# Patient Record
Sex: FEMALE | Race: ASIAN | Employment: OTHER | URBAN - METROPOLITAN AREA
[De-identification: names, ages, dates, MRNs, and addresses within clinical notes are randomized per-mention and may not be internally consistent; named-entity substitution may affect disease eponyms.]

---

## 2021-10-15 ENCOUNTER — LAB REQUISITION (OUTPATIENT)
Dept: LAB | Facility: CLINIC | Age: 71
End: 2021-10-15

## 2021-10-15 DIAGNOSIS — D63.8 ANEMIA IN OTHER CHRONIC DISEASES CLASSIFIED ELSEWHERE: ICD-10-CM

## 2021-10-15 LAB
ALBUMIN SERPL-MCNC: 2.7 G/DL (ref 3.5–5)
ALP SERPL-CCNC: 531 U/L (ref 45–120)
ALT SERPL W P-5'-P-CCNC: 42 U/L (ref 0–45)
ANION GAP SERPL CALCULATED.3IONS-SCNC: 11 MMOL/L (ref 5–18)
AST SERPL W P-5'-P-CCNC: 91 U/L (ref 0–40)
BASOPHILS # BLD AUTO: 0 10E3/UL (ref 0–0.2)
BASOPHILS NFR BLD AUTO: 1 %
BILIRUB SERPL-MCNC: 2.2 MG/DL (ref 0–1)
BUN SERPL-MCNC: 18 MG/DL (ref 8–28)
CALCIUM SERPL-MCNC: 9.2 MG/DL (ref 8.5–10.5)
CHLORIDE BLD-SCNC: 102 MMOL/L (ref 98–107)
CO2 SERPL-SCNC: 24 MMOL/L (ref 22–31)
CREAT SERPL-MCNC: 2 MG/DL (ref 0.6–1.1)
EOSINOPHIL # BLD AUTO: 0.2 10E3/UL (ref 0–0.7)
EOSINOPHIL NFR BLD AUTO: 8 %
ERYTHROCYTE [DISTWIDTH] IN BLOOD BY AUTOMATED COUNT: 18.9 % (ref 10–15)
GFR SERPL CREATININE-BSD FRML MDRD: 24 ML/MIN/1.73M2
GLUCOSE BLD-MCNC: 255 MG/DL (ref 70–125)
HCT VFR BLD AUTO: 24.7 % (ref 35–47)
HGB BLD-MCNC: 7.9 G/DL (ref 11.7–15.7)
IMM GRANULOCYTES # BLD: 0 10E3/UL
IMM GRANULOCYTES NFR BLD: 0 %
LYMPHOCYTES # BLD AUTO: 0.5 10E3/UL (ref 0.8–5.3)
LYMPHOCYTES NFR BLD AUTO: 20 %
MCH RBC QN AUTO: 24.9 PG (ref 26.5–33)
MCHC RBC AUTO-ENTMCNC: 32 G/DL (ref 31.5–36.5)
MCV RBC AUTO: 78 FL (ref 78–100)
MONOCYTES # BLD AUTO: 0.2 10E3/UL (ref 0–1.3)
MONOCYTES NFR BLD AUTO: 10 %
NEUTROPHILS # BLD AUTO: 1.4 10E3/UL (ref 1.6–8.3)
NEUTROPHILS NFR BLD AUTO: 61 %
NRBC # BLD AUTO: 0 10E3/UL
NRBC BLD AUTO-RTO: 0 /100
PLATELET # BLD AUTO: 135 10E3/UL (ref 150–450)
POTASSIUM BLD-SCNC: 4 MMOL/L (ref 3.5–5)
PROT SERPL-MCNC: 5.4 G/DL (ref 6–8)
RBC # BLD AUTO: 3.17 10E6/UL (ref 3.8–5.2)
SODIUM SERPL-SCNC: 137 MMOL/L (ref 136–145)
TSH SERPL DL<=0.005 MIU/L-ACNC: 2.6 UIU/ML (ref 0.3–5)
VIT B12 SERPL-MCNC: 1893 PG/ML (ref 213–816)
WBC # BLD AUTO: 2.3 10E3/UL (ref 4–11)

## 2021-10-15 PROCEDURE — 82607 VITAMIN B-12: CPT | Performed by: INTERNAL MEDICINE

## 2021-10-15 PROCEDURE — 85025 COMPLETE CBC W/AUTO DIFF WBC: CPT | Performed by: INTERNAL MEDICINE

## 2021-10-15 PROCEDURE — 80053 COMPREHEN METABOLIC PANEL: CPT | Performed by: INTERNAL MEDICINE

## 2021-10-15 PROCEDURE — 84443 ASSAY THYROID STIM HORMONE: CPT | Performed by: INTERNAL MEDICINE

## 2021-10-21 ENCOUNTER — LAB REQUISITION (OUTPATIENT)
Dept: LAB | Facility: CLINIC | Age: 71
End: 2021-10-21

## 2021-10-21 DIAGNOSIS — D63.8 ANEMIA IN OTHER CHRONIC DISEASES CLASSIFIED ELSEWHERE: ICD-10-CM

## 2021-10-21 LAB
ALBUMIN SERPL-MCNC: 3.3 G/DL (ref 3.5–5)
ALP SERPL-CCNC: 659 U/L (ref 45–120)
ALT SERPL W P-5'-P-CCNC: 40 U/L (ref 0–45)
ANION GAP SERPL CALCULATED.3IONS-SCNC: 15 MMOL/L (ref 5–18)
AST SERPL W P-5'-P-CCNC: 102 U/L (ref 0–40)
BASOPHILS # BLD AUTO: 0 10E3/UL (ref 0–0.2)
BASOPHILS NFR BLD AUTO: 1 %
BILIRUB SERPL-MCNC: 2.3 MG/DL (ref 0–1)
BUN SERPL-MCNC: 20 MG/DL (ref 8–28)
CALCIUM SERPL-MCNC: 10 MG/DL (ref 8.5–10.5)
CHLORIDE BLD-SCNC: 110 MMOL/L (ref 98–107)
CO2 SERPL-SCNC: 18 MMOL/L (ref 22–31)
CREAT SERPL-MCNC: 1.98 MG/DL (ref 0.6–1.1)
EOSINOPHIL # BLD AUTO: 0.1 10E3/UL (ref 0–0.7)
EOSINOPHIL NFR BLD AUTO: 4 %
ERYTHROCYTE [DISTWIDTH] IN BLOOD BY AUTOMATED COUNT: 19.6 % (ref 10–15)
GFR SERPL CREATININE-BSD FRML MDRD: 25 ML/MIN/1.73M2
GLUCOSE BLD-MCNC: 142 MG/DL (ref 70–125)
HCT VFR BLD AUTO: 31.1 % (ref 35–47)
HGB BLD-MCNC: 9.6 G/DL (ref 11.7–15.7)
IMM GRANULOCYTES # BLD: 0 10E3/UL
IMM GRANULOCYTES NFR BLD: 0 %
LYMPHOCYTES # BLD AUTO: 0.8 10E3/UL (ref 0.8–5.3)
LYMPHOCYTES NFR BLD AUTO: 25 %
MCH RBC QN AUTO: 24.8 PG (ref 26.5–33)
MCHC RBC AUTO-ENTMCNC: 30.9 G/DL (ref 31.5–36.5)
MCV RBC AUTO: 80 FL (ref 78–100)
MONOCYTES # BLD AUTO: 0.2 10E3/UL (ref 0–1.3)
MONOCYTES NFR BLD AUTO: 6 %
NEUTROPHILS # BLD AUTO: 1.9 10E3/UL (ref 1.6–8.3)
NEUTROPHILS NFR BLD AUTO: 64 %
NRBC # BLD AUTO: 0 10E3/UL
NRBC BLD AUTO-RTO: 0 /100
PLATELET # BLD AUTO: 147 10E3/UL (ref 150–450)
POTASSIUM BLD-SCNC: 4.2 MMOL/L (ref 3.5–5)
PROT SERPL-MCNC: 6.8 G/DL (ref 6–8)
RBC # BLD AUTO: 3.87 10E6/UL (ref 3.8–5.2)
SODIUM SERPL-SCNC: 143 MMOL/L (ref 136–145)
WBC # BLD AUTO: 3 10E3/UL (ref 4–11)

## 2021-10-21 PROCEDURE — 80053 COMPREHEN METABOLIC PANEL: CPT | Performed by: INTERNAL MEDICINE

## 2021-10-21 PROCEDURE — 85025 COMPLETE CBC W/AUTO DIFF WBC: CPT | Performed by: INTERNAL MEDICINE

## 2021-10-22 ENCOUNTER — LAB REQUISITION (OUTPATIENT)
Dept: LAB | Facility: CLINIC | Age: 71
End: 2021-10-22

## 2021-10-22 DIAGNOSIS — D63.8 ANEMIA IN OTHER CHRONIC DISEASES CLASSIFIED ELSEWHERE: ICD-10-CM

## 2021-10-22 LAB
ALBUMIN SERPL-MCNC: 2.8 G/DL (ref 3.5–5)
ALP SERPL-CCNC: 555 U/L (ref 45–120)
ALT SERPL W P-5'-P-CCNC: 38 U/L (ref 0–45)
ANION GAP SERPL CALCULATED.3IONS-SCNC: 12 MMOL/L (ref 5–18)
AST SERPL W P-5'-P-CCNC: 96 U/L (ref 0–40)
BASOPHILS # BLD AUTO: 0 10E3/UL (ref 0–0.2)
BASOPHILS NFR BLD AUTO: 1 %
BILIRUB SERPL-MCNC: 1.8 MG/DL (ref 0–1)
BUN SERPL-MCNC: 18 MG/DL (ref 8–28)
CALCIUM SERPL-MCNC: 9.2 MG/DL (ref 8.5–10.5)
CHLORIDE BLD-SCNC: 110 MMOL/L (ref 98–107)
CO2 SERPL-SCNC: 21 MMOL/L (ref 22–31)
CREAT SERPL-MCNC: 1.81 MG/DL (ref 0.6–1.1)
EOSINOPHIL # BLD AUTO: 0.1 10E3/UL (ref 0–0.7)
EOSINOPHIL NFR BLD AUTO: 7 %
ERYTHROCYTE [DISTWIDTH] IN BLOOD BY AUTOMATED COUNT: 18.9 % (ref 10–15)
GFR SERPL CREATININE-BSD FRML MDRD: 28 ML/MIN/1.73M2
GLUCOSE BLD-MCNC: 87 MG/DL (ref 70–125)
HCT VFR BLD AUTO: 26.7 % (ref 35–47)
HGB BLD-MCNC: 8.4 G/DL (ref 11.7–15.7)
IMM GRANULOCYTES # BLD: 0 10E3/UL
IMM GRANULOCYTES NFR BLD: 0 %
LYMPHOCYTES # BLD AUTO: 0.4 10E3/UL (ref 0.8–5.3)
LYMPHOCYTES NFR BLD AUTO: 17 %
MCH RBC QN AUTO: 25.3 PG (ref 26.5–33)
MCHC RBC AUTO-ENTMCNC: 31.5 G/DL (ref 31.5–36.5)
MCV RBC AUTO: 80 FL (ref 78–100)
MONOCYTES # BLD AUTO: 0.2 10E3/UL (ref 0–1.3)
MONOCYTES NFR BLD AUTO: 8 %
NEUTROPHILS # BLD AUTO: 1.5 10E3/UL (ref 1.6–8.3)
NEUTROPHILS NFR BLD AUTO: 67 %
NRBC # BLD AUTO: 0 10E3/UL
NRBC BLD AUTO-RTO: 0 /100
PLATELET # BLD AUTO: 102 10E3/UL (ref 150–450)
POTASSIUM BLD-SCNC: 3.3 MMOL/L (ref 3.5–5)
PROT SERPL-MCNC: 5.6 G/DL (ref 6–8)
RBC # BLD AUTO: 3.32 10E6/UL (ref 3.8–5.2)
SODIUM SERPL-SCNC: 143 MMOL/L (ref 136–145)
WBC # BLD AUTO: 2.2 10E3/UL (ref 4–11)

## 2021-10-22 PROCEDURE — 80053 COMPREHEN METABOLIC PANEL: CPT | Performed by: INTERNAL MEDICINE

## 2021-10-22 PROCEDURE — 85025 COMPLETE CBC W/AUTO DIFF WBC: CPT | Performed by: INTERNAL MEDICINE

## 2021-12-11 ENCOUNTER — APPOINTMENT (OUTPATIENT)
Dept: CT IMAGING | Facility: HOSPITAL | Age: 71
DRG: 193 | End: 2021-12-11
Attending: EMERGENCY MEDICINE
Payer: COMMERCIAL

## 2021-12-11 ENCOUNTER — APPOINTMENT (OUTPATIENT)
Dept: ULTRASOUND IMAGING | Facility: HOSPITAL | Age: 71
DRG: 193 | End: 2021-12-11
Attending: EMERGENCY MEDICINE
Payer: COMMERCIAL

## 2021-12-11 ENCOUNTER — HOSPITAL ENCOUNTER (INPATIENT)
Facility: HOSPITAL | Age: 71
LOS: 3 days | Discharge: HOME HEALTH | DRG: 193 | End: 2021-12-14
Attending: EMERGENCY MEDICINE | Admitting: STUDENT IN AN ORGANIZED HEALTH CARE EDUCATION/TRAINING PROGRAM
Payer: COMMERCIAL

## 2021-12-11 ENCOUNTER — APPOINTMENT (OUTPATIENT)
Dept: RADIOLOGY | Facility: HOSPITAL | Age: 71
DRG: 193 | End: 2021-12-11
Attending: EMERGENCY MEDICINE
Payer: COMMERCIAL

## 2021-12-11 DIAGNOSIS — A41.9 SEPSIS WITHOUT ACUTE ORGAN DYSFUNCTION, DUE TO UNSPECIFIED ORGANISM (H): ICD-10-CM
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
## 2021-12-11 DIAGNOSIS — K74.60 CIRRHOSIS OF LIVER WITH ASCITES, UNSPECIFIED HEPATIC CIRRHOSIS TYPE (H): ICD-10-CM

## 2021-12-11 DIAGNOSIS — J18.9 HOSPITAL-ACQUIRED PNEUMONIA: ICD-10-CM

## 2021-12-11 DIAGNOSIS — R18.8 CIRRHOSIS OF LIVER WITH ASCITES, UNSPECIFIED HEPATIC CIRRHOSIS TYPE (H): ICD-10-CM

## 2021-12-11 DIAGNOSIS — G40.909 SEIZURE DISORDER (H): Primary | ICD-10-CM

## 2021-12-11 PROBLEM — N18.9 ANEMIA IN CHRONIC KIDNEY DISEASE: Status: ACTIVE | Noted: 2021-03-23

## 2021-12-11 PROBLEM — D63.1 ANEMIA IN CHRONIC KIDNEY DISEASE: Status: ACTIVE | Noted: 2021-03-23

## 2021-12-11 PROBLEM — E78.5 HYPERLIPIDEMIA LDL GOAL <70: Status: ACTIVE | Noted: 2017-06-26

## 2021-12-11 PROBLEM — E11.21 DIABETIC NEPHROPATHY ASSOCIATED WITH TYPE 2 DIABETES MELLITUS (H): Status: ACTIVE | Noted: 2020-08-03

## 2021-12-11 LAB
ALBUMIN SERPL-MCNC: 2.8 G/DL (ref 3.5–5)
ALBUMIN UR-MCNC: 10 MG/DL
ALP SERPL-CCNC: 446 U/L (ref 45–120)
ALT SERPL W P-5'-P-CCNC: 28 U/L (ref 0–45)
AMMONIA PLAS-SCNC: 44 UMOL/L (ref 11–35)
ANION GAP SERPL CALCULATED.3IONS-SCNC: 11 MMOL/L (ref 5–18)
APPEARANCE UR: CLEAR
APTT PPP: 38 SECONDS (ref 22–38)
AST SERPL W P-5'-P-CCNC: 65 U/L (ref 0–40)
BACTERIA #/AREA URNS HPF: ABNORMAL /HPF
BASE EXCESS BLDV CALC-SCNC: -0.6 MMOL/L
BASOPHILS # BLD AUTO: 0 10E3/UL (ref 0–0.2)
BASOPHILS NFR BLD AUTO: 0 %
BILIRUB SERPL-MCNC: 2.4 MG/DL (ref 0–1)
BILIRUB UR QL STRIP: NEGATIVE
BNP SERPL-MCNC: 73 PG/ML (ref 0–127)
BUN SERPL-MCNC: 25 MG/DL (ref 8–28)
CALCIUM SERPL-MCNC: 9.8 MG/DL (ref 8.5–10.5)
CHLORIDE BLD-SCNC: 110 MMOL/L (ref 98–107)
CO2 SERPL-SCNC: 20 MMOL/L (ref 22–31)
COLOR UR AUTO: ABNORMAL
CREAT SERPL-MCNC: 2.52 MG/DL (ref 0.6–1.1)
D DIMER PPP FEU-MCNC: 1.04 UG/ML FEU (ref 0–0.5)
EOSINOPHIL # BLD AUTO: 0 10E3/UL (ref 0–0.7)
EOSINOPHIL NFR BLD AUTO: 0 %
ERYTHROCYTE [DISTWIDTH] IN BLOOD BY AUTOMATED COUNT: 15 % (ref 10–15)
GFR SERPL CREATININE-BSD FRML MDRD: 18 ML/MIN/1.73M2
GLUCOSE BLD-MCNC: 268 MG/DL (ref 70–125)
GLUCOSE UR STRIP-MCNC: 70 MG/DL
HCO3 BLDV-SCNC: 24 MMOL/L (ref 24–30)
HCT VFR BLD AUTO: 29.7 % (ref 35–47)
HGB BLD-MCNC: 9.3 G/DL (ref 11.7–15.7)
HGB UR QL STRIP: NEGATIVE
HOLD SPECIMEN: NORMAL
IMM GRANULOCYTES # BLD: 0 10E3/UL
IMM GRANULOCYTES NFR BLD: 0 %
INR PPP: 1.44 (ref 0.9–1.15)
KETONES BLD-SCNC: 0.26 MMOL/L
KETONES UR STRIP-MCNC: NEGATIVE MG/DL
LACTATE SERPL-SCNC: 2.4 MMOL/L (ref 0.7–2)
LEUKOCYTE ESTERASE UR QL STRIP: NEGATIVE
LYMPHOCYTES # BLD AUTO: 0.6 10E3/UL (ref 0.8–5.3)
LYMPHOCYTES NFR BLD AUTO: 7 %
MCH RBC QN AUTO: 23.7 PG (ref 26.5–33)
MCHC RBC AUTO-ENTMCNC: 31.3 G/DL (ref 31.5–36.5)
MCV RBC AUTO: 76 FL (ref 78–100)
MONOCYTES # BLD AUTO: 0.3 10E3/UL (ref 0–1.3)
MONOCYTES NFR BLD AUTO: 4 %
NEUTROPHILS # BLD AUTO: 7.6 10E3/UL (ref 1.6–8.3)
NEUTROPHILS NFR BLD AUTO: 89 %
NITRATE UR QL: NEGATIVE
NRBC # BLD AUTO: 0 10E3/UL
NRBC BLD AUTO-RTO: 0 /100
OXYHGB MFR BLDV: 62.4 % (ref 70–75)
PCO2 BLDV: 37 MM HG (ref 35–50)
PH BLDV: 7.42 [PH] (ref 7.35–7.45)
PH UR STRIP: 7 [PH] (ref 5–7)
PLATELET # BLD AUTO: 113 10E3/UL (ref 150–450)
PO2 BLDV: 36 MM HG (ref 25–47)
POTASSIUM BLD-SCNC: 4.5 MMOL/L (ref 3.5–5)
PROT SERPL-MCNC: 6.6 G/DL (ref 6–8)
RBC # BLD AUTO: 3.92 10E6/UL (ref 3.8–5.2)
RBC URINE: 2 /HPF
SAO2 % BLDV: 63.8 % (ref 70–75)
SARS-COV-2 RNA RESP QL NAA+PROBE: NEGATIVE
SODIUM SERPL-SCNC: 141 MMOL/L (ref 136–145)
SP GR UR STRIP: 1.01 (ref 1–1.03)
TRANSITIONAL EPI: <1 /HPF
TROPONIN I SERPL-MCNC: 0.03 NG/ML (ref 0–0.29)
TROPONIN I SERPL-MCNC: 0.04 NG/ML (ref 0–0.29)
UROBILINOGEN UR STRIP-MCNC: <2 MG/DL
WBC # BLD AUTO: 8.5 10E3/UL (ref 4–11)
WBC URINE: 6 /HPF

## 2021-12-11 PROCEDURE — 99285 EMERGENCY DEPT VISIT HI MDM: CPT | Mod: 25

## 2021-12-11 PROCEDURE — 85379 FIBRIN DEGRADATION QUANT: CPT | Performed by: EMERGENCY MEDICINE

## 2021-12-11 PROCEDURE — 82140 ASSAY OF AMMONIA: CPT | Performed by: EMERGENCY MEDICINE

## 2021-12-11 PROCEDURE — 84156 ASSAY OF PROTEIN URINE: CPT | Performed by: NURSE PRACTITIONER

## 2021-12-11 PROCEDURE — 96361 HYDRATE IV INFUSION ADD-ON: CPT

## 2021-12-11 PROCEDURE — 82805 BLOOD GASES W/O2 SATURATION: CPT | Performed by: EMERGENCY MEDICINE

## 2021-12-11 PROCEDURE — 83935 ASSAY OF URINE OSMOLALITY: CPT | Performed by: NURSE PRACTITIONER

## 2021-12-11 PROCEDURE — 70450 CT HEAD/BRAIN W/O DYE: CPT

## 2021-12-11 PROCEDURE — C9803 HOPD COVID-19 SPEC COLLECT: HCPCS

## 2021-12-11 PROCEDURE — 85610 PROTHROMBIN TIME: CPT | Performed by: EMERGENCY MEDICINE

## 2021-12-11 PROCEDURE — 93970 EXTREMITY STUDY: CPT

## 2021-12-11 PROCEDURE — 83690 ASSAY OF LIPASE: CPT | Performed by: STUDENT IN AN ORGANIZED HEALTH CARE EDUCATION/TRAINING PROGRAM

## 2021-12-11 PROCEDURE — 36415 COLL VENOUS BLD VENIPUNCTURE: CPT | Performed by: EMERGENCY MEDICINE

## 2021-12-11 PROCEDURE — 85025 COMPLETE CBC W/AUTO DIFF WBC: CPT | Performed by: EMERGENCY MEDICINE

## 2021-12-11 PROCEDURE — 87635 SARS-COV-2 COVID-19 AMP PRB: CPT | Performed by: EMERGENCY MEDICINE

## 2021-12-11 PROCEDURE — 87040 BLOOD CULTURE FOR BACTERIA: CPT | Performed by: EMERGENCY MEDICINE

## 2021-12-11 PROCEDURE — 120N000001 HC R&B MED SURG/OB

## 2021-12-11 PROCEDURE — 93005 ELECTROCARDIOGRAM TRACING: CPT | Performed by: EMERGENCY MEDICINE

## 2021-12-11 PROCEDURE — 82010 KETONE BODYS QUAN: CPT | Performed by: EMERGENCY MEDICINE

## 2021-12-11 PROCEDURE — 999N000127 HC STATISTIC PERIPHERAL IV START W US GUIDANCE

## 2021-12-11 PROCEDURE — 51702 INSERT TEMP BLADDER CATH: CPT

## 2021-12-11 PROCEDURE — 83880 ASSAY OF NATRIURETIC PEPTIDE: CPT | Performed by: EMERGENCY MEDICINE

## 2021-12-11 PROCEDURE — 83605 ASSAY OF LACTIC ACID: CPT | Performed by: EMERGENCY MEDICINE

## 2021-12-11 PROCEDURE — 71250 CT THORAX DX C-: CPT

## 2021-12-11 PROCEDURE — 85730 THROMBOPLASTIN TIME PARTIAL: CPT | Performed by: EMERGENCY MEDICINE

## 2021-12-11 PROCEDURE — 80053 COMPREHEN METABOLIC PANEL: CPT | Performed by: EMERGENCY MEDICINE

## 2021-12-11 PROCEDURE — 84484 ASSAY OF TROPONIN QUANT: CPT | Performed by: EMERGENCY MEDICINE

## 2021-12-11 PROCEDURE — 83036 HEMOGLOBIN GLYCOSYLATED A1C: CPT | Performed by: STUDENT IN AN ORGANIZED HEALTH CARE EDUCATION/TRAINING PROGRAM

## 2021-12-11 PROCEDURE — 71045 X-RAY EXAM CHEST 1 VIEW: CPT

## 2021-12-11 PROCEDURE — 258N000003 HC RX IP 258 OP 636: Performed by: EMERGENCY MEDICINE

## 2021-12-11 PROCEDURE — 81001 URINALYSIS AUTO W/SCOPE: CPT | Performed by: EMERGENCY MEDICINE

## 2021-12-11 RX ORDER — SODIUM CHLORIDE 9 MG/ML
INJECTION, SOLUTION INTRAVENOUS CONTINUOUS
Status: DISCONTINUED | OUTPATIENT
Start: 2021-12-11 | End: 2021-12-13

## 2021-12-11 RX ORDER — PIPERACILLIN SODIUM, TAZOBACTAM SODIUM 3; .375 G/15ML; G/15ML
3.38 INJECTION, POWDER, LYOPHILIZED, FOR SOLUTION INTRAVENOUS ONCE
Status: COMPLETED | OUTPATIENT
Start: 2021-12-11 | End: 2021-12-12

## 2021-12-11 RX ORDER — LIDOCAINE HYDROCHLORIDE 20 MG/ML
20 JELLY TOPICAL
Status: DISCONTINUED | OUTPATIENT
Start: 2021-12-11 | End: 2021-12-14 | Stop reason: HOSPADM

## 2021-12-11 RX ADMIN — SODIUM CHLORIDE: 9 INJECTION, SOLUTION INTRAVENOUS at 21:46

## 2021-12-11 RX ADMIN — SODIUM CHLORIDE 1000 ML: 9 INJECTION, SOLUTION INTRAVENOUS at 19:31

## 2021-12-11 NOTE — ED PROVIDER NOTES
Emergency Department Encounter     Evaluation Date & Time:   2021  5:36 PM    CHIEF COMPLAINT:  Altered Mental Status      Triage Note:No notes on file      Impression and Plan     ED COURSE & MEDICAL DECISION MAKIN:47 PM I met with the patient to gather history and to perform my initial exam. I discussed the plan for care while in the Emergency Department. PPE (gloves, glasses, surgical cap, N95 mask) was worn during patient encounters.   8:34 PM I re-evaluated the patient.  9:05 PM Lab called to report a critical result for lactic acid at 2.4.  11:04 PM I discussed the case with hospitalist, Dr. Hall, who accepts the patient for admission.        ED Course as of 21 2344   Sat Dec 11, 2021   1837 This is a nonverbal patient.  Apparently she has a history of becoming nonverbal when she gets infections.  Typically according to EMS who spoke to her family these are typically UTIs.  She otherwise is responding with once saying ouch and with opening her eyes with light touch stimulation and movement of her body.  She seems to use all of her extremities without any obvious one-sided weakness, more just diffusely weak.  So, I do not see any obvious signs of new stroke.  No fever but she is a bit tachypneic and so I certainly think she may meet criteria for sepsis if source of infection is found and so we did do sepsis protocol.  She is getting chest x-ray for the tachypnea to look for any other causes such as fluid, pneumothorax, and will do EKG and troponin to look for signs of cardiac ischemia.  She does grimace with upper abdominal palpation so will look at gallbladder liver pancreas and do imaging of that area to additionally look at stomach and intestinal issues that may be causing her symptoms.  Otherwise since she is not able to provide urine sample and it is quite important I have asked them to put in a Guerra catheter which can be removed later if it is not needed for sepsis.  She does appear  a bit dehydrated so I am ordering some initial fluids for her   1958 Patient has a history of cirrhosis which may explain her elevated liver enzymes, but she is newly quite weak so does need further imaging.  Her D-dimer is elevated and it had not been previously, but as mentioned she does have the underlying cirrhosis.  She has some renal insufficiency enough so that I do not feel comfortable doing CT imaging with contrast to rule out PE but I am going to do ultrasound of her lower extremities to see if there is any evidence for DVT that then would lead to anticoagulation treatment.   2300 Her CAT scan does show a bit of a pneumonia.  It would explain her tachypnea.  Vitally she did not meet criteria for sepsis but she does have an elevated lactic acid that is nonspecific so I think at least borderline sepsis.  Additionally I have limited information on her.  I do have information from a recent transitional care unit that stated she had a recent hospitalization but it is unclear how long she was on in the transitional care unit so this may be hospital associated pneumonia.  Will start treatment with Zosyn at this time.  Otherwise she is doing well she is maintaining her oxygen saturation and is at this point protecting her airway and maintaining wakefulness just continues to be tachypneic.  No other source of infection found.  Her D-dimer was elevated but we do have a source with pneumonia that would cause her to be tachypneic, and her lower extremity ultrasounds are negative for DVT so at this time I do not think that she needs anticoagulation.   2312 Patient admitted to dr mckeon.  M/s tele.  Will be signed out to er md while awaiting room but no beds available in system, will be boarding here.  Only mild ascites       At the conclusion of the encounter I discussed the results of all the tests and the disposition. The questions were answered. The patient or family acknowledged understanding and was agreeable with  the care plan.        FINAL IMPRESSION:    ICD-10-CM    1. Hospital-acquired pneumonia  J18.9     Y95    2. Cirrhosis of liver with ascites, unspecified hepatic cirrhosis type (H)  K74.60     R18.8    3. Sepsis without acute organ dysfunction, due to unspecified organism (H)  A41.9        0 minutes of critical care time        MEDICATIONS GIVEN IN THE EMERGENCY DEPARTMENT:  Medications   sodium chloride (PF) 0.9% PF flush 3 mL (has no administration in time range)   sodium chloride (PF) 0.9% PF flush 3 mL (3 mLs Intracatheter Given 12/11/21 1931)   lidocaine (XYLOCAINE) 2 % external gel 20 mL (has no administration in time range)   0.9% sodium chloride BOLUS (0 mLs Intravenous Stopped 12/11/21 2113)     Followed by   sodium chloride 0.9% infusion ( Intravenous New Bag 12/11/21 2146)   piperacillin-tazobactam (ZOSYN) 3.375 g vial to attach to  mL bag (has no administration in time range)       NEW PRESCRIPTIONS STARTED AT TODAY'S ED VISIT:  New Prescriptions    No medications on file       HPI     HPI     Leon Sweeney is a 71 year old female with a pertinent history of GERD, CKD, HTN, Diabetes, cirrhosis due to chronic hepatitis C, and seizure disorder who presents to this ED by EMS for evaluation of AMS    Per EMS, they were called to patient's home because she has been lethargic and altered mental status,which they note is typical for patient when she has a UTI. Patient has not been talking to anyone. The had paperwork with them that patient was recently at [facility].  Patient has diabetes and took her insulin today, but en route it was still measuring 325. Patient has received 350 ml of normal saline en route. They note patient is warm to touch.    7:57 PM Patient's daughter arrived and provided a more detailed history. Audrey is patient's caregiver in [facility] and they have a home nurse that comes and checks on patient. She was last at the house on 12/8 (~4 days ago) where patient was acting at  "baseline. Daughter notes that patient has a history of congestive heart failure and edema that has slowly been progressing. Daughter states today, patient refused her medications this morning, but was still acting at baseline. When daughter returned this evening, patient took her medications, but was not as verbally responsive as she normally is. Patient has also been experiencing constipation, but had a \"large softball size rock hard stool\" today.     Daughter states patient has been eating less over the past two weeks. 3 weeks ago, patient's blood sugars was in the 500s, but today it is 162. September 26th was the last time patient lived at home alone as she sustained a right hip fracture.     Unable to obtain history from patient during AMS.     REVIEW OF SYSTEMS:  Review of Systems   Unable to perform ROS: Mental status change     remainder of systems are all otherwise negative.        Medical History     Past Medical History:   Diagnosis Date     Acute kidney failure (H)      Anxiety      Cerebral infarction (H)      Chronic kidney disease      Diabetes (H)      Encephalopathy      Gastroesophageal reflux disease      Heart failure (H)      Hydronephrosis      Hyperkalemia      Hypertension      Insomnia      Malnutrition (H)      Pleural effusion      Portal hypertension (H)      Seizures (H)      Transient cerebral ischemic attack        History reviewed. No pertinent surgical history.    History reviewed. No pertinent family history.    Social History     Tobacco Use     Smoking status: None     Smokeless tobacco: None   Substance Use Topics     Alcohol use: None     Drug use: None       No current outpatient medications on file.      Physical Exam     First Vitals:  Patient Vitals for the past 24 hrs:   BP Temp Temp src Pulse Resp SpO2 Weight   12/11/21 2300 118/57 -- -- 78 (!) 38 98 % --   12/11/21 2245 (!) 141/63 -- -- 81 (!) 34 100 % --   12/11/21 2230 (!) 147/68 -- -- 79 (!) 34 98 % --   12/11/21 2215 " "(!) 145/73 -- -- 79 (!) 36 96 % --   12/11/21 2200 134/69 -- -- 80 (!) 33 100 % --   12/11/21 2145 129/62 -- -- 80 29 98 % --   12/11/21 2115 -- -- -- 82 (!) 31 100 % --   12/11/21 2100 137/65 -- -- 80 (!) 31 98 % --   12/11/21 2045 (!) 141/63 -- -- 84 (!) 32 100 % --   12/11/21 2030 (!) 147/67 -- -- 83 (!) 36 100 % --   12/11/21 2015 (!) 148/87 -- -- 97 (!) 32 95 % --   12/11/21 2000 (!) 146/68 -- -- 78 23 99 % --   12/11/21 1951 -- -- -- -- -- -- 68.5 kg (151 lb)   12/11/21 1945 135/63 -- -- 83 (!) 35 100 % --   12/11/21 1943 -- -- -- -- (!) 40 -- --   12/11/21 1941 -- -- -- -- (!) 39 -- --   12/11/21 1940 -- -- -- -- (!) 37 -- --   12/11/21 1939 -- -- -- -- (!) 34 -- --   12/11/21 1937 -- -- -- -- (!) 33 -- --   12/11/21 1936 -- -- -- -- (!) 36 -- --   12/11/21 1935 -- -- -- -- (!) 35 -- --   12/11/21 1934 -- -- -- -- (!) 36 -- --   12/11/21 1930 (!) 143/63 -- -- 79 (!) 32 100 % --   12/11/21 1915 -- -- -- 77 26 -- --   12/11/21 1900 -- -- -- 78 (!) 35 98 % --   12/11/21 1846 -- 97.4  F (36.3  C) Axillary -- -- -- --   12/11/21 1845 (!) 167/82 -- -- 77 (!) 36 100 % --       PHYSICAL EXAM:   Constitutional:   Patient is non-verbal, but holds herself up when we sit her in the sitting position  HENT:  Normocephalic, posterior pharynx wnl, mucous membranes tacky and moderately pink   Eyes:  PERRL, EOMI, Conjunctiva normal, No discharge, no scleral icterus.  Respiratory:  Breathing easily, lungs clear to auscultation  Cardiovascular:  Regular rate and rhythm.  nl s1s2 0 murmurs, rubs, or gallops.  Peripheral pulses dp, pt, and radial are wnl. Trace3 peripheral edema in lower legs  GI:  Bowel sounds normal, No tenderness, No flank tenderness, nondistended. Abdomen soft, but has facial grimace when I palpated upper abdomen.  :No CVA tenderness.   Musculoskeletal:  Moves all extremities without obvious deformities or limitations.  No erythematous or swollen major joints, She said \"Oww\" when I palpated lower left leg. "   Lymphatic:  No cervical lymphadenopathy  Neurologic:  Does respond to light touch. Prefers to keep eyes closed. Non-verbal, but holds herself up when we sit her in the sitting position. Normal motor function, Normal sensory function, No focal deficits noted.   Psychiatric:  Affect normal, Judgment normal, Mood normal.     Results     LAB:  All pertinent labs reviewed and interpreted  Results for orders placed or performed during the hospital encounter of 12/11/21   XR Chest Port 1 View     Status: None    Narrative    EXAM: XR CHEST PORT 1 VIEW  LOCATION: [facility]  DATE/TIME: 12/11/2021 6:08 PM    INDICATION: tachypnea  COMPARISON: 2/1/2021      Impression    IMPRESSION: Heart size upper limits of normal. Very slight interstitial prominence but no focal pneumonia. Tiny ovoid opacity at right costophrenic sulcus should represent granuloma.   CT Chest Abdomen Pelvis w/o Contrast     Status: None    Narrative    EXAM: CT CHEST ABDOMEN PELVIS W/O CONTRAST  LOCATION: [facility]  DATE/TIME: 12/11/2021 9:35 PM    INDICATION: weakness, tachypnea, elevated lft's  COMPARISON: None.  TECHNIQUE: CT scan of the chest, abdomen, and pelvis was performed without IV contrast. Multiplanar reformats were obtained. Dose reduction techniques were used.   CONTRAST: None.    FINDINGS:   LUNGS AND PLEURA: Respiratory motion. Mild mosaic attenuation which can be seen with air trapping/small airway disease. Atelectasis or infiltrate right lung base and small right pleural effusion. Reticulonodular infiltrate within the right middle and   lower lobes.    MEDIASTINUM/AXILLAE: Calcified mediastinal lymph nodes. Atherosclerotic aorta. Small hiatal hernia.    CORONARY ARTERY CALCIFICATION: Moderate..    HEPATOBILIARY: Hepatic cyst. Subcentimeter hepatic hypodensity near the gallbladder fossa small for characterization. Cirrhosis.    PANCREAS: Peripancreatic haziness may be exaggerated by  respiratory motion.    SPLEEN: Splenomegaly    ADRENAL GLANDS: Thickening.    KIDNEYS/BLADDER: Calcifications along the medullary region the kidneys suggesting medullary nephrocalcinosis. No hydronephrosis. Guerra within the bladder.     BOWEL: Right colonic wall thickening. Gastric wall thickening versus underdistention. Moderate amount of stool in the rectal region. Small amount of free fluid abdomen and pelvis.    LYMPH NODES: Normal.    VASCULATURE: Varices left upper quadrant and left retroperitoneum.    PELVIC ORGANS: Small amount of free fluid. Presacral edema.    MUSCULOSKELETAL: Degenerative change osseous structures. Bilateral sacral insufficiency fractures. Remote fractures of the right superior and inferior pubic rami. Lytic lesion in the right parasymphyseal region. Difficult to exclude pathologic fracture   of the right superior pubic ramus. This measures 2.2 x 3.2 cm series 5 image 275. Compression deformity T12. Remote rib fractures.      Impression    IMPRESSION:  1.  Reticulonodular infiltrates within the right middle and lower lobe could be due to infection. Follow-up to confirm resolution recommended to exclude any underlying pulmonary nodules.  2.  Small right pleural effusion.  3.  Cirrhosis and portal hypertension.  4.  Small amount of ascites.  5.  Peripancreatic haziness may be exaggerated by respiratory motion. Pancreatitis not excluded.  6.  Medullary nephrocalcinosis.  7.  Wall thickening of the right colon can be seen with portal hypertensive colopathy. Infectious or inflammatory colitis not excluded.  8.  Wall thickening versus underdistention of the stomach.  9.  Remote fractures right superior and inferior pubic rami. Difficult to exclude underlying lytic lesion/pathologic fracture of the right superior pubic ramus.  10.  Bilateral sacral insufficiency fractures.   Head CT w/o contrast     Status: None    Narrative    EXAM: CT HEAD W/O CONTRAST  LOCATION: [facility]  DATE/TIME: 12/11/2021 9:35 PM    INDICATION: Altered mental status. Nonverbal.  COMPARISON: Brain MR 10/23/2012.  TECHNIQUE: Routine CT Head without IV contrast. Multiplanar reformats. Dose reduction techniques were used.    FINDINGS:  INTRACRANIAL CONTENTS: No intracranial hemorrhage, extraaxial collection, or mass effect. No CT evidence of acute infarct. Mild presumed chronic small vessel ischemic changes. Moderate generalized volume loss. No hydrocephalus.     VISUALIZED ORBITS/SINUSES/MASTOIDS: No intraorbital abnormality. No paranasal sinus mucosal disease. No middle ear or mastoid effusion.    BONES/SOFT TISSUES: No acute abnormality.      Impression    IMPRESSION:  1.  No CT evidence for acute intracranial process.  2.  Brain atrophy and presumed chronic microvascular ischemic changes as above.   US Lower Extremity Venous Duplex Bilateral     Status: None    Narrative    EXAM: US LOWER EXTREMITY VENOUS DUPLEX BILATERAL  LOCATION: [facility]  DATE/TIME: 12/11/2021 9:48 PM    INDICATION: swelling, pain  COMPARISON: None.  TECHNIQUE: Venous Duplex ultrasound of bilateral lower extremities with and without compression, augmentation and duplex. Color flow and spectral Doppler with waveform analysis performed.    FINDINGS: Exam includes the common femoral, femoral, popliteal veins as well as segmentally visualized deep calf veins and greater saphenous vein.     RIGHT: No deep vein thrombosis. No superficial thrombophlebitis. No popliteal cyst.    LEFT: No deep vein thrombosis. No superficial thrombophlebitis. No popliteal cyst.      Impression    IMPRESSION:  1.  No deep venous thrombosis in the bilateral lower extremities.   Comprehensive metabolic panel     Status: Abnormal   Result Value Ref Range    Sodium 141 136 - 145 mmol/L    Potassium 4.5 3.5 - 5.0 mmol/L    Chloride 110 (H) 98 - 107 mmol/L    Carbon Dioxide (CO2) 20 (L) 22 - 31 mmol/L    Anion Gap 11 5 - 18 mmol/L     Urea Nitrogen 25 8 - 28 mg/dL    Creatinine 2.52 (H) 0.60 - 1.10 mg/dL    Calcium 9.8 8.5 - 10.5 mg/dL    Glucose 268 (H) 70 - 125 mg/dL    Alkaline Phosphatase 446 (H) 45 - 120 U/L    AST 65 (H) 0 - 40 U/L    ALT 28 0 - 45 U/L    Protein Total 6.6 6.0 - 8.0 g/dL    Albumin 2.8 (L) 3.5 - 5.0 g/dL    Bilirubin Total 2.4 (H) 0.0 - 1.0 mg/dL    GFR Estimate 18 (L) >60 mL/min/1.73m2   Lactic acid whole blood     Status: Abnormal   Result Value Ref Range    Lactic Acid 2.4 (H) 0.7 - 2.0 mmol/L   Blood gas venous     Status: Abnormal   Result Value Ref Range    pH Venous 7.42 7.35 - 7.45    pCO2 Venous 37 35 - 50 mm Hg    pO2 Venous 36 25 - 47 mm Hg    Bicarbonate Venous 24 24 - 30 mmol/L    Base Excess/Deficit (+/-) -0.6   mmol/L    Oxyhemoglobin Venous 62.4 (L) 70.0 - 75.0 %    O2 Sat, Venous 63.8 (L) 70.0 - 75.0 %   INR     Status: Abnormal   Result Value Ref Range    INR 1.44 (H) 0.90 - 1.15   Partial thromboplastin time     Status: Normal   Result Value Ref Range    aPTT 38 22 - 38 Seconds   D dimer quantitative     Status: Abnormal   Result Value Ref Range    D-Dimer Quantitative 1.04 (H) 0.00 - 0.50 ug/mL FEU    Narrative    This D-dimer assay is intended for use in conjunction with a clinical pretest probability assessment model to exclude pulmonary embolism (PE) and deep venous thrombosis (DVT) in outpatients suspected of PE or DVT. The cut-off value is 0.50 ug/mL FEU.   UA with Microscopic     Status: Abnormal   Result Value Ref Range    Color Urine Light Yellow Colorless, Straw, Light Yellow, Yellow    Appearance Urine Clear Clear    Glucose Urine 70  (A) Negative mg/dL    Bilirubin Urine Negative Negative    Ketones Urine Negative Negative mg/dL    Specific Gravity Urine 1.008 1.001 - 1.030    Blood Urine Negative Negative    pH Urine 7.0 5.0 - 7.0    Protein Albumin Urine 10  (A) Negative mg/dL    Urobilinogen Urine <2.0 <2.0 mg/dL    Nitrite Urine Negative Negative    Leukocyte Esterase Urine Negative  Negative    Bacteria Urine Few (A) None Seen /HPF    RBC Urine 2 <=2 /HPF    WBC Urine 6 (H) <=5 /HPF    Transitional Epithelials Urine <1 <=1 /HPF   Ketone Beta-Hydroxybutyrate Quantitative     Status: Normal   Result Value Ref Range    Ketone (Beta-Hydroxybutyrate) Quantitative 0.26 <=0.3 mmol/L   Troponin I     Status: Normal   Result Value Ref Range    Troponin I 0.03 0.00 - 0.29 ng/mL   B-Type Natriuretic Peptide ([facility] Only)     Status: Normal   Result Value Ref Range    BNP 73 0 - 127 pg/mL   Asymptomatic COVID-19 Virus (Coronavirus) by PCR Nasopharyngeal     Status: Normal    Specimen: Nasopharyngeal; Swab   Result Value Ref Range    SARS CoV2 PCR Negative Negative    Narrative    Testing was performed using the [employer]  SARS-CoV-2 & Influenza A/B Assay on the [employer]  [employer]  System.  This test should be ordered for the detection of SARS-COV-2 in individuals who meet SARS-CoV-2 clinical and/or epidemiological criteria. Test performance is unknown in asymptomatic patients.  This test is for in vitro diagnostic use under the FDA EUA for laboratories certified under CLIA to perform moderate and/or high complexity testing. This test has not been FDA cleared or approved.  A negative test does not rule out the presence of PCR inhibitors in the specimen or target RNA in concentration below the limit of detection for the assay. The possibility of a false negative should be considered if the patient's recent exposure or clinical presentation suggests COVID-19.  [facility] are certified under the Clinical Laboratory Improvement Amendments of 1988 (CLIA-88) as qualified to perform moderate and/or high complexity laboratory testing.   CBC with platelets and differential     Status: Abnormal   Result Value Ref Range    WBC Count 8.5 4.0 - 11.0 10e3/uL    RBC Count 3.92 3.80 - 5.20 10e6/uL    Hemoglobin 9.3 (L) 11.7 - 15.7 g/dL    Hematocrit 29.7 (L) 35.0 - 47.0 %    MCV 76 (L) 78 - 100 fL    MCH 23.7 (L)  26.5 - 33.0 pg    MCHC 31.3 (L) 31.5 - 36.5 g/dL    RDW 15.0 10.0 - 15.0 %    Platelet Count 113 (L) 150 - 450 10e3/uL    % Neutrophils 89 %    % Lymphocytes 7 %    % Monocytes 4 %    % Eosinophils 0 %    % Basophils 0 %    % Immature Granulocytes 0 %    NRBCs per 100 WBC 0 <1 /100    Absolute Neutrophils 7.6 1.6 - 8.3 10e3/uL    Absolute Lymphocytes 0.6 (L) 0.8 - 5.3 10e3/uL    Absolute Monocytes 0.3 0.0 - 1.3 10e3/uL    Absolute Eosinophils 0.0 0.0 - 0.7 10e3/uL    Absolute Basophils 0.0 0.0 - 0.2 10e3/uL    Absolute Immature Granulocytes 0.0 <=0.4 10e3/uL    Absolute NRBCs 0.0 10e3/uL   Extra Red Top Tube     Status: None   Result Value Ref Range    Hold Specimen JIC    Troponin I     Status: Normal   Result Value Ref Range    Troponin I 0.04 0.00 - 0.29 ng/mL   Ammonia (on ice)     Status: Abnormal   Result Value Ref Range    Ammonia 44 (H) 11 - 35 umol/L   [facility] Draw *Canceled*     Status: None ()    Narrative    The following orders were created for panel order [place] Draw.  Procedure                               Abnormality         Status                     ---------                               -----------         ------                       Please view results for these tests on the individual orders.   CBC with platelets differential     Status: Abnormal    Narrative    The following orders were created for panel order CBC with platelets differential.  Procedure                               Abnormality         Status                     ---------                               -----------         ------                     CBC with platelets and d...[[id number]]  Abnormal            Final result                 Please view results for these tests on the individual orders.   Extra Tube     Status: None    Narrative    The following orders were created for panel order Extra Tube.  Procedure                               Abnormality         Status                     ---------                                -----------         ------                     Extra Red Top Tube[[id number]]                               Final result                 Please view results for these tests on the individual orders.       RADIOLOGY:  No results found.    ECG:  None    PROCEDURES:  Procedures:      [facility] Documentation       CMS Diagnoses: sepsis without severe sepsis.       The creation of this record is based on the scribe s observations of the work being performed by Jazmine Castañeda and the provider s statements to them. This document has been checked and approved by MD Mercedez Barraza MD  Emergency Medicine  [facility] EMERGENCY DEPARTMENT         Mercedez Zepeda MD  12/11/21 1520

## 2021-12-11 NOTE — ED NOTES
Bed: JNED-09  Expected date: 12/11/21  Expected time: 5:22 PM  Means of arrival: Ambulance  Comments:  Anatoly 71f urosepsis

## 2021-12-12 ENCOUNTER — APPOINTMENT (OUTPATIENT)
Dept: OCCUPATIONAL THERAPY | Facility: HOSPITAL | Age: 71
DRG: 193 | End: 2021-12-12
Attending: INTERNAL MEDICINE
Payer: COMMERCIAL

## 2021-12-12 ENCOUNTER — APPOINTMENT (OUTPATIENT)
Dept: SPEECH THERAPY | Facility: HOSPITAL | Age: 71
DRG: 193 | End: 2021-12-12
Attending: STUDENT IN AN ORGANIZED HEALTH CARE EDUCATION/TRAINING PROGRAM
Payer: COMMERCIAL

## 2021-12-12 ENCOUNTER — APPOINTMENT (OUTPATIENT)
Dept: CARDIOLOGY | Facility: HOSPITAL | Age: 71
DRG: 193 | End: 2021-12-12
Payer: COMMERCIAL

## 2021-12-12 PROBLEM — G93.41 METABOLIC ENCEPHALOPATHY: Status: ACTIVE | Noted: 2021-12-12

## 2021-12-12 LAB
ALBUMIN MFR UR ELPH: 21.4 MG/DL
ATRIAL RATE - MUSE: 82 BPM
CREAT SERPL-MCNC: 2.01 MG/DL (ref 0.6–1.1)
CREAT UR-MCNC: 17 MG/DL
DIASTOLIC BLOOD PRESSURE - MUSE: NORMAL MMHG
GFR SERPL CREATININE-BSD FRML MDRD: 24 ML/MIN/1.73M2
GLUCOSE BLDC GLUCOMTR-MCNC: 134 MG/DL (ref 70–99)
GLUCOSE BLDC GLUCOMTR-MCNC: 138 MG/DL (ref 70–99)
GLUCOSE BLDC GLUCOMTR-MCNC: 234 MG/DL (ref 70–99)
GLUCOSE BLDC GLUCOMTR-MCNC: 265 MG/DL (ref 70–99)
GLUCOSE BLDC GLUCOMTR-MCNC: 329 MG/DL (ref 70–99)
HBA1C MFR BLD: 7.8 %
INTERPRETATION ECG - MUSE: NORMAL
LEVETIRACETAM (KEPPRA): 46.8 UG/ML (ref 6–46)
LIPASE SERPL-CCNC: 66 U/L (ref 0–52)
LVEF ECHO: NORMAL
OSMOLALITY UR: 324 MOSM/KG (ref 300–900)
P AXIS - MUSE: 65 DEGREES
PHOSPHATE SERPL-MCNC: 2.5 MG/DL (ref 2.5–4.5)
PR INTERVAL - MUSE: 176 MS
PROT/CREAT 24H UR: 1.26 MG/MG CR
QRS DURATION - MUSE: 70 MS
QT - MUSE: 422 MS
QTC - MUSE: 493 MS
R AXIS - MUSE: -21 DEGREES
SYSTOLIC BLOOD PRESSURE - MUSE: NORMAL MMHG
T AXIS - MUSE: 66 DEGREES
VENTRICULAR RATE- MUSE: 82 BPM

## 2021-12-12 PROCEDURE — 84155 ASSAY OF PROTEIN SERUM: CPT | Performed by: INTERNAL MEDICINE

## 2021-12-12 PROCEDURE — 250N000011 HC RX IP 250 OP 636: Performed by: EMERGENCY MEDICINE

## 2021-12-12 PROCEDURE — 36415 COLL VENOUS BLD VENIPUNCTURE: CPT | Performed by: PSYCHIATRY & NEUROLOGY

## 2021-12-12 PROCEDURE — 96376 TX/PRO/DX INJ SAME DRUG ADON: CPT

## 2021-12-12 PROCEDURE — 250N000012 HC RX MED GY IP 250 OP 636 PS 637: Performed by: INTERNAL MEDICINE

## 2021-12-12 PROCEDURE — 999N000208 ECHOCARDIOGRAM COMPLETE

## 2021-12-12 PROCEDURE — 96366 THER/PROPH/DIAG IV INF ADDON: CPT

## 2021-12-12 PROCEDURE — 97166 OT EVAL MOD COMPLEX 45 MIN: CPT | Mod: GO

## 2021-12-12 PROCEDURE — 96367 TX/PROPH/DG ADDL SEQ IV INF: CPT

## 2021-12-12 PROCEDURE — 99223 1ST HOSP IP/OBS HIGH 75: CPT | Performed by: PSYCHIATRY & NEUROLOGY

## 2021-12-12 PROCEDURE — 250N000013 HC RX MED GY IP 250 OP 250 PS 637: Performed by: STUDENT IN AN ORGANIZED HEALTH CARE EDUCATION/TRAINING PROGRAM

## 2021-12-12 PROCEDURE — 258N000003 HC RX IP 258 OP 636: Performed by: EMERGENCY MEDICINE

## 2021-12-12 PROCEDURE — 250N000013 HC RX MED GY IP 250 OP 250 PS 637: Performed by: NURSE PRACTITIONER

## 2021-12-12 PROCEDURE — 93306 TTE W/DOPPLER COMPLETE: CPT | Mod: 26 | Performed by: INTERNAL MEDICINE

## 2021-12-12 PROCEDURE — 82565 ASSAY OF CREATININE: CPT | Performed by: STUDENT IN AN ORGANIZED HEALTH CARE EDUCATION/TRAINING PROGRAM

## 2021-12-12 PROCEDURE — 92610 EVALUATE SWALLOWING FUNCTION: CPT | Mod: GN

## 2021-12-12 PROCEDURE — 99222 1ST HOSP IP/OBS MODERATE 55: CPT | Performed by: STUDENT IN AN ORGANIZED HEALTH CARE EDUCATION/TRAINING PROGRAM

## 2021-12-12 PROCEDURE — 250N000013 HC RX MED GY IP 250 OP 250 PS 637: Performed by: INTERNAL MEDICINE

## 2021-12-12 PROCEDURE — 120N000001 HC R&B MED SURG/OB

## 2021-12-12 PROCEDURE — 84100 ASSAY OF PHOSPHORUS: CPT | Performed by: NURSE PRACTITIONER

## 2021-12-12 PROCEDURE — 84165 PROTEIN E-PHORESIS SERUM: CPT | Mod: TC | Performed by: INTERNAL MEDICINE

## 2021-12-12 PROCEDURE — 250N000011 HC RX IP 250 OP 636: Performed by: STUDENT IN AN ORGANIZED HEALTH CARE EDUCATION/TRAINING PROGRAM

## 2021-12-12 PROCEDURE — 96361 HYDRATE IV INFUSION ADD-ON: CPT

## 2021-12-12 PROCEDURE — 97535 SELF CARE MNGMENT TRAINING: CPT | Mod: GO

## 2021-12-12 PROCEDURE — 258N000003 HC RX IP 258 OP 636: Performed by: STUDENT IN AN ORGANIZED HEALTH CARE EDUCATION/TRAINING PROGRAM

## 2021-12-12 PROCEDURE — 36415 COLL VENOUS BLD VENIPUNCTURE: CPT | Performed by: STUDENT IN AN ORGANIZED HEALTH CARE EDUCATION/TRAINING PROGRAM

## 2021-12-12 PROCEDURE — 258N000003 HC RX IP 258 OP 636: Performed by: NURSE PRACTITIONER

## 2021-12-12 PROCEDURE — 80177 DRUG SCRN QUAN LEVETIRACETAM: CPT | Performed by: PSYCHIATRY & NEUROLOGY

## 2021-12-12 PROCEDURE — 255N000002 HC RX 255 OP 636: Performed by: INTERNAL MEDICINE

## 2021-12-12 PROCEDURE — 96365 THER/PROPH/DIAG IV INF INIT: CPT | Mod: 59

## 2021-12-12 RX ORDER — VANCOMYCIN HYDROCHLORIDE 500 MG/10ML
500 INJECTION, POWDER, LYOPHILIZED, FOR SOLUTION INTRAVENOUS EVERY 24 HOURS
Status: DISCONTINUED | OUTPATIENT
Start: 2021-12-13 | End: 2021-12-12

## 2021-12-12 RX ORDER — METOPROLOL TARTRATE 25 MG/1
25 TABLET, FILM COATED ORAL 2 TIMES DAILY
COMMUNITY

## 2021-12-12 RX ORDER — NICOTINE POLACRILEX 4 MG
15-30 LOZENGE BUCCAL
Status: DISCONTINUED | OUTPATIENT
Start: 2021-12-12 | End: 2021-12-14 | Stop reason: HOSPADM

## 2021-12-12 RX ORDER — LEVETIRACETAM 500 MG/1
500 TABLET ORAL 2 TIMES DAILY
Status: DISCONTINUED | OUTPATIENT
Start: 2021-12-12 | End: 2021-12-14 | Stop reason: HOSPADM

## 2021-12-12 RX ORDER — METHOCARBAMOL 500 MG/1
500 TABLET, FILM COATED ORAL 4 TIMES DAILY PRN
Status: ON HOLD | COMMUNITY
End: 2021-12-14

## 2021-12-12 RX ORDER — ONDANSETRON 4 MG/1
4 TABLET, ORALLY DISINTEGRATING ORAL EVERY 6 HOURS PRN
Status: DISCONTINUED | OUTPATIENT
Start: 2021-12-12 | End: 2021-12-14 | Stop reason: HOSPADM

## 2021-12-12 RX ORDER — METOPROLOL TARTRATE 25 MG/1
25 TABLET, FILM COATED ORAL 2 TIMES DAILY
Status: DISCONTINUED | OUTPATIENT
Start: 2021-12-12 | End: 2021-12-14 | Stop reason: HOSPADM

## 2021-12-12 RX ORDER — LACTULOSE 10 G/15ML
15 SOLUTION ORAL DAILY
COMMUNITY

## 2021-12-12 RX ORDER — LEVETIRACETAM 500 MG/1
500 TABLET ORAL 2 TIMES DAILY
COMMUNITY

## 2021-12-12 RX ORDER — DEXTROSE MONOHYDRATE 25 G/50ML
25-50 INJECTION, SOLUTION INTRAVENOUS
Status: DISCONTINUED | OUTPATIENT
Start: 2021-12-12 | End: 2021-12-14 | Stop reason: HOSPADM

## 2021-12-12 RX ORDER — CLOPIDOGREL BISULFATE 75 MG/1
75 TABLET ORAL DAILY
COMMUNITY

## 2021-12-12 RX ORDER — CLOPIDOGREL BISULFATE 75 MG/1
75 TABLET ORAL DAILY
Status: DISCONTINUED | OUTPATIENT
Start: 2021-12-12 | End: 2021-12-14 | Stop reason: HOSPADM

## 2021-12-12 RX ORDER — FAMOTIDINE 20 MG/1
20 TABLET, FILM COATED ORAL
Status: DISCONTINUED | OUTPATIENT
Start: 2021-12-13 | End: 2021-12-14 | Stop reason: HOSPADM

## 2021-12-12 RX ORDER — VANCOMYCIN HYDROCHLORIDE 500 MG/10ML
500 INJECTION, POWDER, LYOPHILIZED, FOR SOLUTION INTRAVENOUS
Status: DISCONTINUED | OUTPATIENT
Start: 2021-12-12 | End: 2021-12-13 | Stop reason: ALTCHOICE

## 2021-12-12 RX ORDER — MULTIPLE VITAMINS W/ MINERALS TAB 9MG-400MCG
1 TAB ORAL DAILY
COMMUNITY

## 2021-12-12 RX ORDER — ACETAMINOPHEN 325 MG/1
650 TABLET ORAL
COMMUNITY

## 2021-12-12 RX ORDER — AMLODIPINE BESYLATE 5 MG/1
5 TABLET ORAL DAILY
COMMUNITY

## 2021-12-12 RX ORDER — TRAZODONE HYDROCHLORIDE 50 MG/1
50 TABLET, FILM COATED ORAL
COMMUNITY

## 2021-12-12 RX ORDER — ACETAMINOPHEN 650 MG/1
650 SUPPOSITORY RECTAL EVERY 6 HOURS PRN
Status: DISCONTINUED | OUTPATIENT
Start: 2021-12-12 | End: 2021-12-14 | Stop reason: HOSPADM

## 2021-12-12 RX ORDER — LIDOCAINE 40 MG/G
CREAM TOPICAL
Status: DISCONTINUED | OUTPATIENT
Start: 2021-12-12 | End: 2021-12-14 | Stop reason: HOSPADM

## 2021-12-12 RX ORDER — AMLODIPINE BESYLATE 5 MG/1
5 TABLET ORAL ONCE
Status: COMPLETED | OUTPATIENT
Start: 2021-12-12 | End: 2021-12-12

## 2021-12-12 RX ORDER — PIPERACILLIN SODIUM, TAZOBACTAM SODIUM 3; .375 G/15ML; G/15ML
3.38 INJECTION, POWDER, LYOPHILIZED, FOR SOLUTION INTRAVENOUS EVERY 8 HOURS
Status: DISCONTINUED | OUTPATIENT
Start: 2021-12-12 | End: 2021-12-14 | Stop reason: ALTCHOICE

## 2021-12-12 RX ORDER — ROSUVASTATIN CALCIUM 20 MG/1
20 TABLET, COATED ORAL DAILY
COMMUNITY

## 2021-12-12 RX ORDER — ACETAMINOPHEN 325 MG/1
650 TABLET ORAL EVERY 6 HOURS PRN
Status: DISCONTINUED | OUTPATIENT
Start: 2021-12-12 | End: 2021-12-14 | Stop reason: HOSPADM

## 2021-12-12 RX ORDER — FAMOTIDINE 20 MG/1
20 TABLET, FILM COATED ORAL 2 TIMES DAILY
Status: ON HOLD | COMMUNITY
End: 2022-01-06

## 2021-12-12 RX ORDER — ONDANSETRON 2 MG/ML
4 INJECTION INTRAMUSCULAR; INTRAVENOUS EVERY 6 HOURS PRN
Status: DISCONTINUED | OUTPATIENT
Start: 2021-12-12 | End: 2021-12-14 | Stop reason: HOSPADM

## 2021-12-12 RX ORDER — TORSEMIDE 20 MG/1
20 TABLET ORAL DAILY
COMMUNITY

## 2021-12-12 RX ORDER — FERROUS SULFATE 324(65)MG
324 TABLET, DELAYED RELEASE (ENTERIC COATED) ORAL DAILY
COMMUNITY
End: 2022-01-03

## 2021-12-12 RX ORDER — AMLODIPINE BESYLATE 5 MG/1
10 TABLET ORAL DAILY
Status: DISCONTINUED | OUTPATIENT
Start: 2021-12-13 | End: 2021-12-14 | Stop reason: HOSPADM

## 2021-12-12 RX ORDER — AMLODIPINE BESYLATE 5 MG/1
5 TABLET ORAL DAILY
Status: DISCONTINUED | OUTPATIENT
Start: 2021-12-12 | End: 2021-12-12

## 2021-12-12 RX ORDER — LACTULOSE 10 G/15ML
10 SOLUTION ORAL
Status: DISCONTINUED | OUTPATIENT
Start: 2021-12-12 | End: 2021-12-14 | Stop reason: HOSPADM

## 2021-12-12 RX ADMIN — PIPERACILLIN SODIUM AND TAZOBACTAM SODIUM 3.38 G: 3; .375 INJECTION, POWDER, LYOPHILIZED, FOR SOLUTION INTRAVENOUS at 00:00

## 2021-12-12 RX ADMIN — PIPERACILLIN SODIUM AND TAZOBACTAM SODIUM 3.38 G: 3; .375 INJECTION, POWDER, LYOPHILIZED, FOR SOLUTION INTRAVENOUS at 22:34

## 2021-12-12 RX ADMIN — METOPROLOL TARTRATE 25 MG: 25 TABLET, FILM COATED ORAL at 20:46

## 2021-12-12 RX ADMIN — CLOPIDOGREL BISULFATE 75 MG: 75 TABLET ORAL at 13:31

## 2021-12-12 RX ADMIN — LACTULOSE 10 G: 10 SOLUTION ORAL at 17:31

## 2021-12-12 RX ADMIN — LEVETIRACETAM 500 MG: 500 TABLET, FILM COATED ORAL at 20:46

## 2021-12-12 RX ADMIN — INSULIN ASPART 2 UNITS: 100 INJECTION, SOLUTION INTRAVENOUS; SUBCUTANEOUS at 16:25

## 2021-12-12 RX ADMIN — SODIUM CHLORIDE: 9 INJECTION, SOLUTION INTRAVENOUS at 20:48

## 2021-12-12 RX ADMIN — SODIUM CHLORIDE: 9 INJECTION, SOLUTION INTRAVENOUS at 08:30

## 2021-12-12 RX ADMIN — LACTULOSE 10 G: 10 SOLUTION ORAL at 08:23

## 2021-12-12 RX ADMIN — Medication 1 MG: at 18:12

## 2021-12-12 RX ADMIN — METOPROLOL TARTRATE 25 MG: 25 TABLET, FILM COATED ORAL at 13:32

## 2021-12-12 RX ADMIN — PIPERACILLIN SODIUM AND TAZOBACTAM SODIUM 3.38 G: 3; .375 INJECTION, POWDER, LYOPHILIZED, FOR SOLUTION INTRAVENOUS at 13:47

## 2021-12-12 RX ADMIN — PERFLUTREN 2 ML: 6.52 INJECTION, SUSPENSION INTRAVENOUS at 12:20

## 2021-12-12 RX ADMIN — PIPERACILLIN SODIUM AND TAZOBACTAM SODIUM 3.38 G: 3; .375 INJECTION, POWDER, LYOPHILIZED, FOR SOLUTION INTRAVENOUS at 08:14

## 2021-12-12 RX ADMIN — INSULIN ASPART 2 UNITS: 100 INJECTION, SOLUTION INTRAVENOUS; SUBCUTANEOUS at 13:32

## 2021-12-12 RX ADMIN — VANCOMYCIN HYDROCHLORIDE 1500 MG: 5 INJECTION, POWDER, LYOPHILIZED, FOR SOLUTION INTRAVENOUS at 03:22

## 2021-12-12 RX ADMIN — LEVETIRACETAM 500 MG: 500 TABLET, FILM COATED ORAL at 13:31

## 2021-12-12 RX ADMIN — AMLODIPINE BESYLATE 5 MG: 5 TABLET ORAL at 13:31

## 2021-12-12 ASSESSMENT — ACTIVITIES OF DAILY LIVING (ADL)
ADLS_ACUITY_SCORE: 13
ADLS_ACUITY_SCORE: 9
ADLS_ACUITY_SCORE: 11
ADLS_ACUITY_SCORE: 13
ADLS_ACUITY_SCORE: 11
ADLS_ACUITY_SCORE: 11
ADLS_ACUITY_SCORE: 9
ADLS_ACUITY_SCORE: 11
ADLS_ACUITY_SCORE: 13
ADLS_ACUITY_SCORE: 11
ADLS_ACUITY_SCORE: 11
ADLS_ACUITY_SCORE: 13
ADLS_ACUITY_SCORE: 13
ADLS_ACUITY_SCORE: 11
PREVIOUS_RESPONSIBILITIES: MEAL PREP;LAUNDRY;HOUSEKEEPING;MEDICATION MANAGEMENT
ADLS_ACUITY_SCORE: 13

## 2021-12-12 NOTE — PHARMACY-VANCOMYCIN DOSING SERVICE
"Pharmacy Vancomycin Initial Note  Date of Service 2021  Patient's  1950  71 year old, female    Indication: [facility] Pneumonia    Current estimated CrCl = CrCl cannot be calculated (Unknown ideal weight.).   Height = 162.2 cm per 2021 nephrology visit    Creatinine for last 3 days  2021:  6:34 PM Creatinine 2.52 mg/dL    Recent Vancomycin Level(s) for last 3 days  No results found for requested labs within last 72 hours.      Vancomycin IV Administrations (past 72 hours)      No vancomycin orders with administrations in past 72 hours.                Nephrotoxins and other renal medications (From now, onward)    Start     Dose/Rate Route Frequency Ordered Stop    21 0600  piperacillin-tazobactam (ZOSYN) 3.375 g vial to attach to  mL bag        Note to Pharmacy: For [facility], [facility] and [facility]: For Zosyn-naive patients, use the \"Zosyn initial dose + extended infusion\" order panel.    3.375 g  over 240 Minutes Intravenous EVERY 8 HOURS 21 0129            Contrast Orders - past 72 hours (72h ago, onward)            None          [employer] Prediction of Planned Initial Vancomycin Regimen  Loading dose: 1500 mg at 03:00 2021.  Regimen: 500 mg IV every 24 hours.  Start time: 01:57 on 2021  Exposure target: AUC24 (range)400-600 mg/L.hr   AUC24,ss: 425 mg/L.hr  Probability of AUC24 > 400: 57 %  Ctrough,ss: 15 mg/L  Probability of Ctrough,ss > 20: 22 %  Probability of nephrotoxicity (Lodise JORGE ): 10 %        Plan:  1. Start vancomycin  1500 mg IV x1 dose, then 500 mg IV q24h.   2. Vancomycin monitoring method: AUC  3. Vancomycin therapeutic monitoring goal: 400-600 mg*h/L  4. Pharmacy will check vancomycin levels as appropriate in 1-3 Days.    5. Serum creatinine levels will be ordered a minimum of twice weekly.      Jada Sheldon, [facility]    "

## 2021-12-12 NOTE — ED TRIAGE NOTES
Patient arrived via EMS with report of altered mental status, not answering questions, no verbal communication, per family- similar symptoms in the past with UTI.  RR increased 30s, ETCO2 decreased, distal extremities are cool to touch with core being warm.  EMS states son en route to ED.

## 2021-12-12 NOTE — PHARMACY-ADMISSION MEDICATION HISTORY
Pharmacy Note - Admission Medication History    Pertinent Provider Information: none     ______________________________________________________________________    Prior To Admission (PTA) med list completed and updated in EMR.       PTA Med List   Medication Sig Last Dose     acetaminophen (TYLENOL) 325 MG tablet Take 650 mg by mouth 3 times daily 12/11/2021 at Unknown time     amLODIPine (NORVASC) 5 MG tablet Take 5 mg by mouth daily 12/11/2021 at Unknown time     calcium carbonate (OS-VI) 1500 (600 Ca) MG tablet Take 600 mg by mouth 2 times daily (with meals) 12/11/2021 at Unknown time     clopidogrel (PLAVIX) 75 MG tablet Take 75 mg by mouth daily 12/11/2021 at Unknown time     famotidine (PEPCID) 20 MG tablet Take 20 mg by mouth 2 times daily 12/11/2021 at Unknown time     Ferrous Sulfate 324 (65 Fe) MG TBEC Take 324 mg by mouth daily 12/11/2021 at Unknown time     insulin aspart (NOVOLOG PEN) 100 UNIT/ML pen Give 8 units subcutaneous three times daily with meals. Hold for BS <90.     Sliding scale: 151-200 2 units  201--250: 4 units  251-300: 6 units  301-350: 8 units      insulin glargine (LANTUS PEN) 100 UNIT/ML pen Inject 18 Units Subcutaneous At Bedtime Past Week at Unknown time     lactulose (CHRONULAC) 10 GM/15ML solution Take 15 mLs by mouth daily 12/11/2021 at Unknown time     levETIRAcetam (KEPPRA) 500 MG tablet Take 500 mg by mouth 2 times daily 12/11/2021 at Unknown time     methocarbamol (ROBAXIN) 500 MG tablet Take 500 mg by mouth 4 times daily as needed for muscle spasms Unknown at Unknown time     metoprolol tartrate (LOPRESSOR) 25 MG tablet Take 25 mg by mouth 2 times daily 12/11/2021 at Unknown time     multivitamin w/minerals (THERA-VIT-M) tablet Take 1 tablet by mouth daily 12/11/2021 at Unknown time     rosuvastatin (CRESTOR) 20 MG tablet Take 20 mg by mouth daily 12/11/2021 at Unknown time     traZODone (DESYREL) 50 MG tablet Take 50 mg by mouth nightly as needed for sleep Unknown at  Unknown time       Information source(s): Family member and Facility (TCU/NH/) medication list/MAR  Method of interview communication: phone    Summary of Changes to PTA Med List  New: all  Discontinued: none  Changed: none    Patient was asked about OTC/herbal products specifically.  PTA med list reflects this.    In the past week, patient estimated taking medication this percent of the time:  greater than 90%.    Allergies were reviewed, assessed, and updated with the patient.      Patient does not use any multi-dose medications prior to admission.    The information provided in this note is only as accurate as the sources available at the time of the update(s).    Thank you for the opportunity to participate in the care of this patient.    Kirsten Vanegas [facility]  12/12/2021 8:55 AM

## 2021-12-12 NOTE — CONSULTS
12/12/2021 December 12, 2021  10:38 AM    Impression and Plan:     1. Acute Kidney Injury/CKD 3b- Baseline creatinine is 1.72 to 2, presented with creatinine of 2.52, this has decreased to 2.01 with IV fluid therapy.  She is followed by Dr. Call Nephrology with [facility].  Has baseline CKD 3b that was thought to be due to Diabetes, Hypertension, and Hep C cirrhosis.  Evaluation of cryoglobulinemia has been negative, SPEP negative, ZHANG negative.  Has been on torsemide 20 mg daily for CHF.      Seen with mckeon cath with about 250 ml of yellow urine today, CT of the abdomen with no hydro.  No recent IV contrast exposure, no NSAID use reported, and no ACE/ARB.  I suspect her MARIA DE JESUS on CKD is due to combination of pre-renal dehydration in setting of intravascular volume depletion with added over-diuresis.  Bilateral lower extremity ultrasound with no DVT.  She does appears volume slightly volume depleted with only traced bilateral leg edema.    To further evaluate, we will check urinalysis with microscopy to screen for active urinary sediment.  Check urine protein creatinine ratio to quantify proteinuria, and check urine osmolality.      Plan is to avoid any further renal insult by renally dosing all medications and avoiding nephrotoxic medications.  Avoid ACE inhibitors/ARB, Aminoglycosides/ IV constrast/  NSAID if possible  :   -Accurate I/O   -Daily weight   -On normal saline 125 ml/hr, this is after 1000 ml of bolus, will decrease to 50 ml/hr for now and monitor fluid status.        2. Altered mental Status: UA negative,  Wbc 8.5.  EKG negative. CT of the head with no intracranial process.  Blood culture pending.  Chest CT with possible infiltrate with small right pleural effusion.  Also noted to have small ascites.  Ammonia slightly elevated at 44.  BNP not elevated.  Treatment per IM.    -Alert and oriented during my visit today, this is after abx and fluid therapy.   -Confusion likely due to elevated ammonia in  setting of hep c, dehydration and possible infection.  Continue IV hydration, but monitor very closely for fluid overload.          3. Blood Pressure/Volume:  Patient with long standing history of hypertension.  Note reviewed showed past +ZHANG with hydralazine therapy.  Blood pressure mildly hypertensive today .  Was on amlodipine 5 mg daily, metoprolol 25 mg bid, and torsemide 20 mg daily.     -Hold Torsemide and continue amlodipine and metoprolol.  Will increase amlodipine to 10 mg for now.     -I ordered Echogram.     -Avoid over-correction of blood pressure, allow mild hyper-tension to help with renal perfusion.      4. Anemia: MARIA DE JESUS, chronic illness, and cirrhosis all possibly contributing (target hgb of 10-11).  Hgb at 9.3, platelet at 113.  Patient is certainly at risk for GI bleed especially with cirrhosis and CKD.        - Monitor CBC     5. Diabetes: A1C at 7.8. On insulin, treatment per IM.      CC: MARIA DE JESUS    HPI: Mr Sweeney is a 71 years old female withpast medical history significant for GERD, CKD, HTN, Diabetes, cirrhosis due to chronic hepatitis C, and seizure.  She presented to the ED yesterday via EMS for evaluation of AMS for about 4 days.  EMS were called to her home due to lethargic and altered mental status.  Patient blood sugar checked by EMS was 325, she got 350 ml of fluid en route due to being dry.     Family reports history of congestive heart failure and edema was slowly worsening.  Her daughter also reported decreased appetite over the last 2 weeks with blood sugars running in the 500's.  In the hospital she got 1L bolus normal saline and was started on continues IV fluid therapy after she was noted to be in MARIA DE JESUS.  She was also started on antibiotic for possible pneumonia, blood culture pending.      Upon evaluation today, the patient was in bed resting.  Alert, interactive and oriented.  Denies nausea, vomiting, diarrhea, bleeding, infection, recent antibiotic use, NSAID, SOB, chest pain, fever  and chills.  She was able to tell me the date, her name, and her daughter's name.  Participated in ROS.            Medical History:   Past Medical History:   Diagnosis Date     Acute kidney failure (H)      Anxiety      Cerebral infarction (H)      Chronic kidney disease     stage III; with anemia     Diabetes (H)     Type II with neuropathy     Encephalopathy      Gastroesophageal reflux disease      Heart failure (H)     unspecified     Hydronephrosis      Hyperkalemia      Hypertension      Insomnia      Malnutrition (H)      Pleural effusion      Portal hypertension (H)      Seizures (H)      Transient cerebral ischemic attack        Surgical History: History reviewed. No pertinent surgical history.    Medications:    Prior to admission: (Not in a hospital admission)     Current:   Current Facility-Administered Medications:      acetaminophen (TYLENOL) tablet 650 mg, 650 mg, Oral, Q6H PRN **OR** acetaminophen (TYLENOL) Suppository 650 mg, 650 mg, Rectal, Q6H PRN, Lexx Hall MD     amLODIPine (NORVASC) tablet 5 mg, 5 mg, Oral, Daily, Rox Berrios MD     clopidogrel (PLAVIX) tablet 75 mg, 75 mg, Oral, Daily, Rox Berrios MD     glucose gel 15-30 g, 15-30 g, Oral, Q15 Min PRN **OR** dextrose 50 % injection 25-50 mL, 25-50 mL, Intravenous, Q15 Min PRN **OR** glucagon injection 1 mg, 1 mg, Subcutaneous, Q15 Min PRN, Lexx Hall MD     [START ON 12/13/2021] famotidine (PEPCID) tablet 20 mg, 20 mg, Oral, Q48H, Rox Berrios MD     insulin aspart (NovoLOG) injection (RAPID ACTING), 1-4 Units, Subcutaneous, TID w/meals, Rox Berrios MD     lactulose (CHRONULAC) solution 10 g, 10 g, Oral, TID, Lexx Hall MD, 10 g at 12/12/21 0823     levETIRAcetam (KEPPRA) tablet 500 mg, 500 mg, Oral, BID, Rox Berrios MD     lidocaine (LMX4) cream, , Topical, Q1H PRN, Lexx Hall MD     lidocaine (XYLOCAINE) 2 % external gel 20 mL, 20 mL, Urethral, Once PRN, Mercedez Zepeda MD      lidocaine 1 % 0.1-1 mL, 0.1-1 mL, Other, Q1H PRN, Lexx Hall MD     melatonin tablet 1 mg, 1 mg, Oral, At Bedtime PRN, Lexx Hall MD     metoprolol tartrate (LOPRESSOR) tablet 25 mg, 25 mg, Oral, BID, Rox Berrios MD     ondansetron (ZOFRAN-ODT) ODT tab 4 mg, 4 mg, Oral, Q6H PRN **OR** ondansetron (ZOFRAN) injection 4 mg, 4 mg, Intravenous, Q6H PRN, Lexx Hall MD     piperacillin-tazobactam (ZOSYN) 3.375 g vial to attach to  mL bag, 3.375 g, Intravenous, Q8H, Lexx Hall MD, 3.375 g at 12/12/21 0814     sodium chloride (PF) 0.9% PF flush 3 mL, 3 mL, Intracatheter, Q8H, Lexx Hall MD     sodium chloride (PF) 0.9% PF flush 3 mL, 3 mL, Intracatheter, q1 min prn, Lexx Hall MD     sodium chloride (PF) 0.9% PF flush 3 mL, 3 mL, Intracatheter, q1 min prn, Mercedez Zepeda MD     sodium chloride (PF) 0.9% PF flush 3 mL, 3 mL, Intracatheter, Q8H, Mercedez Zepeda MD, 3 mL at 12/11/21 1931     [COMPLETED] 0.9% sodium chloride BOLUS, 1,000 mL, Intravenous, Once, Stopped at 12/11/21 2113 **FOLLOWED BY** sodium chloride 0.9% infusion, , Intravenous, Continuous, Mercedez Zepeda MD, Last Rate: 125 mL/hr at 12/12/21 0830, New Bag at 12/12/21 0830     [COMPLETED] vancomycin (VANCOCIN) 1,500 mg in sodium chloride 0.9 % 250 mL intermittent infusion, 1,500 mg, Intravenous, Q24H, Stopped at 12/12/21 0830 **FOLLOWED BY** [START ON 12/13/2021] vancomycin (VANCOCIN) 500 mg vial to attach to  mL bag, 500 mg, Intravenous, Q24H, Lexx Hall MD    Current Outpatient Medications:      acetaminophen (TYLENOL) 325 MG tablet, Take 650 mg by mouth 3 times daily, Disp: , Rfl:      amLODIPine (NORVASC) 5 MG tablet, Take 5 mg by mouth daily, Disp: , Rfl:      calcium carbonate (OS-VI) 1500 (600 Ca) MG tablet, Take 600 mg by mouth 2 times daily (with meals), Disp: , Rfl:      clopidogrel (PLAVIX) 75 MG tablet, Take 75 mg by mouth daily, Disp: , Rfl:      famotidine (PEPCID) 20 MG  tablet, Take 20 mg by mouth 2 times daily, Disp: , Rfl:      Ferrous Sulfate 324 (65 Fe) MG TBEC, Take 324 mg by mouth daily, Disp: , Rfl:      insulin aspart (NOVOLOG PEN) 100 UNIT/ML pen, Give 8 units subcutaneous three times daily with meals. Hold for BS <90.   Sliding scale: 151-200 2 units 201--250: 4 units 251-300: 6 units 301-350: 8 units, Disp: , Rfl:      insulin glargine (LANTUS PEN) 100 UNIT/ML pen, Inject 18 Units Subcutaneous At Bedtime, Disp: , Rfl:      lactulose (CHRONULAC) 10 GM/15ML solution, Take 15 mLs by mouth daily, Disp: , Rfl:      levETIRAcetam (KEPPRA) 500 MG tablet, Take 500 mg by mouth 2 times daily, Disp: , Rfl:      methocarbamol (ROBAXIN) 500 MG tablet, Take 500 mg by mouth 4 times daily as needed for muscle spasms, Disp: , Rfl:      metoprolol tartrate (LOPRESSOR) 25 MG tablet, Take 25 mg by mouth 2 times daily, Disp: , Rfl:      multivitamin w/minerals (THERA-VIT-M) tablet, Take 1 tablet by mouth daily, Disp: , Rfl:      rosuvastatin (CRESTOR) 20 MG tablet, Take 20 mg by mouth daily, Disp: , Rfl:      torsemide (DEMADEX) 20 MG tablet, Take 20 mg by mouth daily, Disp: , Rfl:      traZODone (DESYREL) 50 MG tablet, Take 50 mg by mouth nightly as needed for sleep, Disp: , Rfl:     Allergies: Mirtazapine    Social History:   Social History     Socioeconomic History     Marital status: Single     Spouse name: None     Number of children: None     Years of education: None     Highest education level: None   Occupational History     None   Tobacco Use     Smoking status: None     Smokeless tobacco: None   Substance and Sexual Activity     Alcohol use: None     Drug use: None     Sexual activity: None   Other Topics Concern     None   Social History Narrative     None     Social Determinants of Health     Financial Resource Strain: Not on file   Food Insecurity: Not on file   Transportation Needs: Not on file   Physical Activity: Not on file   Stress: Not on file   Social Connections: Not on  "file   Intimate Partner Violence: Not on file   Housing Stability: Not on file       Family History: History reviewed. No pertinent family history.    Review of Systems: :   ROS: Comprehensive ROS was negative other than as noted in the HPI.      Physical Exam:   /60   Pulse 73   Temp 98.7  F (37.1  C) (Oral)   Resp (!) 34   Ht 1.626 m (5' 4.02\")   Wt 68.5 kg (151 lb)   SpO2 100%   BMI 25.91 kg/m    In general this is a 71 year old female in no acute distress.   General: Calm & comfortable   Eyes: Pupils equal, sclerae not icteric   ENT: Mucus membranes moist, no lesions noted   Resp: Diminished lung sound, no distress, normal effort   CV: RRR without murmur, traced hip/leg edema   GI: Soft NT NG   Psych: A&Ox3, not depressed   Neuro: Moves all extremities.     Skin: No rash noted     Labs and XRays: Reviewed    Recent Labs   Lab Test 12/11/21  1834 10/22/21  0620 10/21/21  0615   POTASSIUM 4.5 3.3* 4.2   CHLORIDE 110* 110* 110*   ANIONGAP 11 12 15       Recent Labs   Lab Test 12/11/21  1834 10/22/21  0620 10/21/21  0615   WBC 8.5 2.2* 3.0*   RBC 3.92 3.32* 3.87   MCV 76* 80 80   MCH 23.7* 25.3* 24.8*   RDW 15.0 18.9* 19.6*       Recent Labs   Lab Test 12/11/21  1833   INR 1.44*       Thank you for allowing me to participate in @@ Zahra s care.    Henny Flores, DNP, CNP, DHIRAJ  [facility]  Office # [phone]      "

## 2021-12-12 NOTE — H&P
[facility]    History and Physical - Hospitalist Service       Date of Admission:  12/11/2021    Assessment & Plan      Leon Sweeney is a 71 year old female admitted on 12/11/2021.         Altered mental state  CT head unremarkable for acute changes, elevated ammonia and infiltrates on CT chest.  Altered mental status likely multifactorial from infection and hyperammonemia.  Supportive measures, treat underlying infection and start on lactulose.  Neurochecks every 4 hours. NPO until SLT eval    Healthcare associated pneumonia  -CT chest reticulonodular infiltrates within the right middle lower lobes.  Fully vaccinated for Covid, check Covid PCR.  -Start vancomycin and Zosyn.  SLT eval given AMS     .  Cirrhosis  -Now with altered mental state and elevated ammonia, will start on lactulose    Hypertension  -Resume home meds after med rec, hydralazine as needed    MARIA DE JESUS on CKD 3  -Avoid nephrotoxic's, IV hydration with NS, monitor BMP      Peripancreatic haziness on CT concerning for pancreatitis  -We will check lipase, continue IV hydration    Elevated lactic acid-IV hydration    Hyperglycemia-history of DM , will check A1c, monitor on sliding scale insulin.       Diet:    DVT Prophylaxis: Heparin SQ  Guerra Catheter: PRESENT, indication:    Central Lines: None  Code Status:  Full code      Clinically Significant Risk Factors Present on Admission     # Coagulation Defect: INR = 1.44 (Ref range: 0.90 - 1.15) and/or PTT = 38 Seconds (Ref range: 22 - 38 Seconds) on admission, will monitor for bleeding  # Thrombocytopenia: Plts = 113 10e3/uL (Ref range: 150 - 450 10e3/uL) on admission, will monitor for bleeding      Disposition Plan   Expected Discharge:-Multiple days    Anticipated discharge location:  Awaiting care coordination huddle       The patient's care was discussed with the Patient's Family.    Lexx Hall MD  [facility]  Securely message with the Vocera Web  Console (learn more here)  Text page via [facility] Paging/Directory        ______________________________________________________________________    Chief Complaint   AMS    History is obtained from the Patient's family/daughter    History of Present Illness   72-year-old female with past medical history of GERD, CKD, hypertension and type II DM brought for altered mental state.  Family called EMS as patient was changed from her baseline.  Per patient's daughter patient was acting weird since 12/11/20/2020 1 in the afternoon, taking her clothes off, pacing , not speaking to family members, and refusing medications.  She recently got out of TCU in October after hospitalization to [facility] following a fall in September.  Per daughter there is no history of fever chills, nausea vomiting or diarrhea.  No cough or complaints of shortness of breath.  Work-up at ED CT head no evidence of acute intracranial process, elevated ammonia level and CT chest with reticulonodular infiltrates within the right and middle lower lobes and right pleural effusion.    Review of Systems    The 10 point Review of Systems is negative other than noted in the HPI or here.     Past Medical History    I have reviewed this patient's medical history and updated it with pertinent information if needed.   Past Medical History:   Diagnosis Date     Acute kidney failure (H)      Anxiety      Cerebral infarction (H)      Chronic kidney disease     stage III; with anemia     Diabetes (H)     Type II with neuropathy     Encephalopathy      Gastroesophageal reflux disease      Heart failure (H)     unspecified     Hydronephrosis      Hyperkalemia      Hypertension      Insomnia      Malnutrition (H)      Pleural effusion      Portal hypertension (H)      Seizures (H)      Transient cerebral ischemic attack        Past Surgical History   I have reviewed this patient's surgical history and updated it with pertinent information if needed.  History  reviewed. No pertinent surgical history.    Social History   I have reviewed this patient's social history and updated it with pertinent information if needed.  Social History     Tobacco Use     Smoking status: None     Smokeless tobacco: None   Substance Use Topics     Alcohol use: None     Drug use: None       Family History   No pertinent family history    Prior to Admission Medications   None     Allergies   Allergies   Allergen Reactions     Mirtazapine      Noted on [facility]  10/06/2021       Physical Exam   Vital Signs: Temp: 97.4  F (36.3  C) Temp src: Axillary BP: 137/66 Pulse: 74   Resp: 25 SpO2: 100 %      Weight: 151 lbs 0 oz    General Appearance: Not in distress, staring at me initially then pulled the blanket up to her face, does not respond  and not speaking.  Noted to be moving bilateral upper and lower extremities to stimulation.  eyes: Pink conj , NIS  HEENT: PERRLA, EOMI  Respiratory: Bilateral good air entry  Cardiovascular: S1 and S2, no murmur heard  GI: Soft abdomen  Genitourinary: Guerra in situ  Skin no rashes  Musculoskeletal: No edema  Neurologic: Awake, not cooperative during exam.  Does not respond when spoken to, but moving bilateral upper lower extremities and trying to push away my hand during exam.    Data   Data reviewed today: I reviewed all medications, new labs and imaging results over the last 24 hours. I personally reviewed     Recent Labs   Lab 12/11/21  1834 12/11/21  1833   WBC 8.5  --    HGB 9.3*  --    MCV 76*  --    *  --    INR  --  1.44*     --    POTASSIUM 4.5  --    CHLORIDE 110*  --    CO2 20*  --    BUN 25  --    CR 2.52*  --    ANIONGAP 11  --    VI 9.8  --    *  --    ALBUMIN 2.8*  --    PROTTOTAL 6.6  --    BILITOTAL 2.4*  --    ALKPHOS 446*  --    ALT 28  --    AST 65*  --      Recent Results (from the past 24 hour(s))   XR Chest Port 1 View    Narrative    EXAM: XR CHEST PORT 1 VIEW  LOCATION: [facility]  DATE/TIME: 12/11/2021 6:08 PM    INDICATION: tachypnea  COMPARISON: 2/1/2021      Impression    IMPRESSION: Heart size upper limits of normal. Very slight interstitial prominence but no focal pneumonia. Tiny ovoid opacity at right costophrenic sulcus should represent granuloma.   Head CT w/o contrast    Narrative    EXAM: CT HEAD W/O CONTRAST  LOCATION: [facility]  DATE/TIME: 12/11/2021 9:35 PM    INDICATION: Altered mental status. Nonverbal.  COMPARISON: Brain MR 10/23/2012.  TECHNIQUE: Routine CT Head without IV contrast. Multiplanar reformats. Dose reduction techniques were used.    FINDINGS:  INTRACRANIAL CONTENTS: No intracranial hemorrhage, extraaxial collection, or mass effect. No CT evidence of acute infarct. Mild presumed chronic small vessel ischemic changes. Moderate generalized volume loss. No hydrocephalus.     VISUALIZED ORBITS/SINUSES/MASTOIDS: No intraorbital abnormality. No paranasal sinus mucosal disease. No middle ear or mastoid effusion.    BONES/SOFT TISSUES: No acute abnormality.      Impression    IMPRESSION:  1.  No CT evidence for acute intracranial process.  2.  Brain atrophy and presumed chronic microvascular ischemic changes as above.   CT Chest Abdomen Pelvis w/o Contrast    Narrative    EXAM: CT CHEST ABDOMEN PELVIS W/O CONTRAST  LOCATION: [facility]  DATE/TIME: 12/11/2021 9:35 PM    INDICATION: weakness, tachypnea, elevated lft's  COMPARISON: None.  TECHNIQUE: CT scan of the chest, abdomen, and pelvis was performed without IV contrast. Multiplanar reformats were obtained. Dose reduction techniques were used.   CONTRAST: None.    FINDINGS:   LUNGS AND PLEURA: Respiratory motion. Mild mosaic attenuation which can be seen with air trapping/small airway disease. Atelectasis or infiltrate right lung base and small right pleural effusion. Reticulonodular infiltrate within the right middle and   lower  lobes.    MEDIASTINUM/AXILLAE: Calcified mediastinal lymph nodes. Atherosclerotic aorta. Small hiatal hernia.    CORONARY ARTERY CALCIFICATION: Moderate..    HEPATOBILIARY: Hepatic cyst. Subcentimeter hepatic hypodensity near the gallbladder fossa small for characterization. Cirrhosis.    PANCREAS: Peripancreatic haziness may be exaggerated by respiratory motion.    SPLEEN: Splenomegaly    ADRENAL GLANDS: Thickening.    KIDNEYS/BLADDER: Calcifications along the medullary region the kidneys suggesting medullary nephrocalcinosis. No hydronephrosis. Guerra within the bladder.     BOWEL: Right colonic wall thickening. Gastric wall thickening versus underdistention. Moderate amount of stool in the rectal region. Small amount of free fluid abdomen and pelvis.    LYMPH NODES: Normal.    VASCULATURE: Varices left upper quadrant and left retroperitoneum.    PELVIC ORGANS: Small amount of free fluid. Presacral edema.    MUSCULOSKELETAL: Degenerative change osseous structures. Bilateral sacral insufficiency fractures. Remote fractures of the right superior and inferior pubic rami. Lytic lesion in the right parasymphyseal region. Difficult to exclude pathologic fracture   of the right superior pubic ramus. This measures 2.2 x 3.2 cm series 5 image 275. Compression deformity T12. Remote rib fractures.      Impression    IMPRESSION:  1.  Reticulonodular infiltrates within the right middle and lower lobe could be due to infection. Follow-up to confirm resolution recommended to exclude any underlying pulmonary nodules.  2.  Small right pleural effusion.  3.  Cirrhosis and portal hypertension.  4.  Small amount of ascites.  5.  Peripancreatic haziness may be exaggerated by respiratory motion. Pancreatitis not excluded.  6.  Medullary nephrocalcinosis.  7.  Wall thickening of the right colon can be seen with portal hypertensive colopathy. Infectious or inflammatory colitis not excluded.  8.  Wall thickening versus underdistention of  the stomach.  9.  Remote fractures right superior and inferior pubic rami. Difficult to exclude underlying lytic lesion/pathologic fracture of the right superior pubic ramus.  10.  Bilateral sacral insufficiency fractures.   US Lower Extremity Venous Duplex Bilateral    Narrative    EXAM: US LOWER EXTREMITY VENOUS DUPLEX BILATERAL  LOCATION: [facility]  DATE/TIME: 12/11/2021 9:48 PM    INDICATION: swelling, pain  COMPARISON: None.  TECHNIQUE: Venous Duplex ultrasound of bilateral lower extremities with and without compression, augmentation and duplex. Color flow and spectral Doppler with waveform analysis performed.    FINDINGS: Exam includes the common femoral, femoral, popliteal veins as well as segmentally visualized deep calf veins and greater saphenous vein.     RIGHT: No deep vein thrombosis. No superficial thrombophlebitis. No popliteal cyst.    LEFT: No deep vein thrombosis. No superficial thrombophlebitis. No popliteal cyst.      Impression    IMPRESSION:  1.  No deep venous thrombosis in the bilateral lower extremities.

## 2021-12-12 NOTE — ED NOTES
"Pt yelling for help and tech into pts room and pt found standing at the foot of the bed with stool on bed, gown, and floor.  Pt holding up slide sheet to body saying \"Cold, cold.\" commode called for pt, pt placed on commode while staff cleaned up pt and bedding.  Pt repeatedly saying she is cold.  Pt then cleaned up new linen placed and pt assisted back into bed.  Pt given 4 warm blankets  And still complaining of being cold. pts temp check and temp was 97.7 orally.   connected via [employer] and told that pt was just given warm blankets and needed to let them work.  When  attempted to communicate with pt would not respond.  Pt just kept saying she was cold.  Tech attempted to communicate with pt via vocera and  and pt still not responding to . Lab arrived and pt told labs needed to be obtained and that pt only needed one arm uncovered and that pt could remain under blankets.  Pt told that  would disconnect since pt was not responding, pt just turned head away from staff.  disconnected and lab left with pt to draw morning labs. Primary nurse notified   "

## 2021-12-12 NOTE — PROGRESS NOTES
12/12/21 1545   Quick Adds   Type of Visit Initial Occupational Therapy Evaluation       Present no  (declined)   Language Cambodian and English   Living Environment   People in home alone   Self-Care   Usual Activity Tolerance good   Current Activity Tolerance moderate   Activity/Exercise/Self-Care Comment independent self cares   Instrumental Activities of Daily Living (IADL)   Previous Responsibilities meal prep;laundry;housekeeping;medication management   IADL Comments dtr. gets groceries   Disability/Function   Hearing Difficulty or Deaf yes   Hearing Management ok to speak loud according to DTR   Wear Glasses or Blind no   Number of times patient has fallen within last six months   (pt. stated no but chart indicated yes with hip fracture )   General Information   Onset of Illness/Injury or Date of Surgery 12/11/21   Cognitive Status Examination   Orientation Status person;other (see comments)  (stated [place] as place and 2002 as year, aware of month)   Cognitive Status Comments Pt. not aware of why she came to hospital   Pain Assessment   Patient Currently in Pain No   Range of Motion Comprehensive   General Range of Motion no range of motion deficits identified   Bed Mobility   Bed Mobility supine-sit   Supine-Sit Sebastian (Bed Mobility) supervision;verbal cues   Assistive Device (Bed Mobility) bed rails   Balance   Balance Assessment standing balance: static   Standing Balance: Static fair balance   Clinical Impression   Criteria for Skilled Therapeutic Interventions Met (OT) yes;meets criteria;skilled treatment is necessary   OT Diagnosis AMS due to ammonia level/infection   OT Problem List-Impairments impacting ADL balance;cognition;mobility;activity tolerance impaired   Assessment of Occupational Performance 3-5 Performance Deficits   Identified Performance Deficits cognition, toileting, dressing, bathing, toileting   Planned Therapy Interventions (OT) ADL  retraining;balance training;cognition;transfer training;strengthening   Clinical Decision Making Complexity (OT) moderate complexity   Therapy Frequency (OT) Daily   Predicted Duration of Therapy 6 days   Risk & Benefits of therapy have been explained evaluation/treatment results reviewed;patient   Comment-Clinical Impression Pt. not safe for independent living situation at this time due impaired ADL and cognitive skills   OT Discharge Planning    OT Discharge Recommendation (DC Rec) Transitional Care Facility;Home with assist   OT Rationale for DC Rec Pt. would require daily support for safety with ADL/IADL   Total Evaluation Time (Minutes)   Total Evaluation Time (Minutes) 15

## 2021-12-12 NOTE — PROGRESS NOTES
Speech Therapy:  Bedside Swallow Study:     12/12/21 0800   General Information   Onset of Illness/Injury or Date of Surgery 12/11/21   Pertinent History of Current Problem 72-year-old female with past medical history of GERD, CKD, hypertension and type II DM brought for altered mental state.  Family called EMS as patient was changed from her baseline.  Per patient's daughter patient was acting weird since 12/11/20/2020 1 in the afternoon, taking her clothes off, pacing , not speaking to family members, and refusing medications.  She recently got out of TCU in October after hospitalization to [facility] following a fall in September.  Per daughter there is no history of fever chills, nausea vomiting or diarrhea.  No cough or complaints of shortness of breath.  Work-up at ED CT head no evidence of acute intracranial process, elevated ammonia level and CT chest with reticulonodular infiltrates within the right and middle lower lobes and right pleural effusion.   General Observations She presents as she is not trusting us, but does respond to explanation of why we are doing what we are doing. (nurse and SLP in room together)   Past History of Dysphagia none      Language had  on lang line, but pt speaks and understands english and was bothered that she did not know who the  was.    Type of Evaluation   Type of Evaluation Swallow Evaluation   Oral Motor   Oral Musculature generally intact   Dentition (Oral Motor)   Dentition (Oral Motor) natural dentition   Facial Symmetry (Oral Motor)   Facial Symmetry (Oral Motor) WNL   Lip Function (Oral Motor)   Lip Range of Motion (Oral Motor) WNL   Tongue Function (Oral Motor)   Tongue ROM (Oral Motor) WNL   General Swallowing Observations   Current Diet/Method of Nutritional Intake (General Swallowing Observations, NIS) NPO   Swallowing Evaluation Clinical swallow evaluation   Clinical Swallow Evaluation   Feeding Assistance no assistance  needed   Clinical Swallow Evaluation Textures Trialed thin liquids;solid foods   Clinical Swallow Eval: Thin Liquid Texture Trial   Mode of Presentation, Thin Liquids straw;fed by clinician   Volume of Liquid or Food Presented approximately 1 oz   Oral Phase of Swallow WFL   Diagnostic Statement No oral dysphagia or sx's aspiration observed.   Clinical Swallow Evaluation: Solid Food Texture Trial   Mode of Presentation self-fed   Volume Presented was resistant at first, but she did take a bite of saltine cracker   Oral Phase WF   Diagnostic Statement No oral dysphagia or sx's aspiration observed.   Swallowing Recommendations   Diet Consistency Recommendations thin liquids (level 0);regular diet   Comment, Swallowing Recommendations No oral dysphagia or sx's aspiration observed with regular textures and thin liquids   SLP Therapy Assessment/Plan   Therapy Frequency (SLP Eval) evaluation only    Total Evaluation Time   Total Evaluation Time (Minutes) 15

## 2021-12-12 NOTE — CONSULTS
"This is a neurological consultation    71-year-old male to hospital on 12/11/2021    Chief complaint  Acute mental status change  History of seizures      HPI  71-year-old brought to the emergency room on December 11, 2021 with change in mental status.  Patient had been lethargic the day of admission had not been talking.  Family states that usually this happens when the patient is sick or has a UTI.  Patient's glucose when transported was 325    Patient has a caregiver that checks on the patient possibly was last at the house on 12/8/2021 and patient was acting at the normal baseline.    Patient also has a history of congestive heart failure and edema which has been progressing  Supposedly the morning of admission patient refused some medications but was still \"acting at normal baseline.\"  Daughter had returned later in the evening patient was not verbally responsive.    Patient has been eating less over the last 2 weeks to 3 weeks, sometimes could have blood sugars into the 500s.    On work-up in the ER  Patient had a slightly elevated ammonia  Has infiltrates on her CT of the chest  Does have chronic cirrhosis of the liver  Has poorly controlled diabetes    Past medical history  Epilepsy, on [facility], August 2012  Hypertension  Hyperlipidemia  Diabetes, poorly controlled (hemoglobin A1c 11.4%, 2016)  Malnutrition  History of encephalopathy  CKD  GERD  Cirrhosis/chronic hepatitis C    Habits  Does not smoke  Does not drink alcohol    Family history   Noncontributory    Work-up    CT scan had 12/11/2021  A.  No acute stroke hemorrhage or hydrocephalus  B.  Moderate generalized volume loss  C.  Mild chronic small vessel changes    CT scan chest abdomen pelvis 12/11/2021  1.  Reticulonodular infiltrates within the right middle and lower lobe could be due to infection. Follow-up to confirm resolution recommended to exclude any underlying pulmonary nodules.  2.  Small right pleural effusion.  3.  Cirrhosis and " portal hypertension.  4.  Small amount of ascites.  5.  Peripancreatic haziness may be exaggerated by respiratory motion. Pancreatitis not excluded.  6.  Medullary nephrocalcinosis.  7.  Wall thickening of the right colon can be seen with portal hypertensive colopathy. Infectious or inflammatory colitis not excluded.  8.  Wall thickening versus underdistention of the stomach.  9.  Remote fractures right superior and inferior pubic rami. Difficult to exclude underlying lytic lesion/pathologic fracture of the right superior pubic ramus.  10.  Bilateral sacral insufficiency fractures.  Echo 60-65% ejection fraction, left atrium normal size  B12 1893, TSH 2.6, (10/15/2021)  Hemoglobin A1c 7.8% (12/11/2021)  Ammonia level 44, (12/11/2021)  D-dimer 1.04  UA negative for infection  COVID-19 negative    Labs  Sodium 141 potassium 4.5  BUN 25 creatinine 2.52  AST 65/ALT 28  Ammonia level 44  Hemoglobin A1c 7.8%  White blood count 8.5  Hemoglobin 9.3  Platelets 113,000        Exam  Blood pressure 132/60, pulse 73, temperature 98.7  Blood pressure range 118//82  Pulse range 64-97  T-max 98.7    Review of systems  No headache  No pain  Seems to be talking okay  No focal weakness  Otherwise review of systems negative    General exam per primary MD  Alert attentive  HEENT no signs of trauma  Lungs diminished breath sounds  Heart rate regular  Some edema in the feet  Abdomen soft    Neurologic exam  Alert attentive appears comfortable  Able to talk there is some language barrier but was able to communicate with me  No neglect  Difficulty test memory with language barrier    Cranials 2 through 12  No ophthalmoplegia  No nystagmus  Visual fields intact  Face symmetrical    Upper extremities  No drift no tremor    Lower extremities  Proximal and distal strength okay in the bed    Gait  Deferred      Assessment/plan    1.  Acute change in mental status/multifactorial encephalopathy       CT scan of the chest   possible  infiltrate and small pleural effusion on the right       Slightly elevated ammonia at 44         Poorly controlled diabetes       Chronic renal failure       History of congestive heart failure    2.  History of CVA in 2012       Per chart notes history of epilepsy on Keppra       Per chart current Keppra dose 500 mg twice daily    Recommend  Check Keppra level make sure it is not toxic with the liver failure  Can check a EEG tomorrow      Discussed with primary MD face-to-face  Extensive EMR note review    Total care time today 70 minutes with greater than 50% face-to-face patient care team discussing and evaluating the above.

## 2021-12-12 NOTE — ED NOTES
Pt is alert and oriented x3. Pt denies pain. Pt will transfer to unit P1. Report was called to the nurse on P1.

## 2021-12-12 NOTE — PROGRESS NOTES
[facility]    After midnight - Hospitalist Service    Assessment and Plan    Active Problems:    Benign essential hypertension    CKD (chronic kidney disease) stage 4, GFR 15-29 ml/min (H)    Diabetic nephropathy associated with type 2 diabetes mellitus (H)    Seizure disorder (H)    Hepatic cirrhosis due to chronic hepatitis C infection (H)    Hospital-acquired pneumonia    Cirrhosis of liver with ascites, unspecified hepatic cirrhosis type (H)    Metabolic encephalopathy    Leon Sweeney is a 71 year old old female with h/o , CKD, hypertension and type II DM brought for altered mental state.  Family called EMS as patient was changed from her baseline.  Per patient's daughter patient was acting weird since 12/11/20/2020 1 in the afternoon, taking her clothes off, pacing , not speaking to family members, and refusing medications.  She recently got out of TCU in October after hospitalization to [facility] following a fall in September.  Patient admitted after midnight this is follow-up        Altered mental state/metabolic encephalopathy   - CT head unremarkable for acute changes, elevated ammonia and infiltrates on CT chest.    - Ammonia slightly up but not that significantly elevated to cause confusion  - with seizure history  Question if post-ictal?? Neurology consulted  - continue home Keppra   - EEG  - may be from infection and treating  - IVF   - Therapy      Healthcare associated pneumonia  -CT chest reticulonodular infiltrates within the right middle lower lobes.  Fully vaccinated for Covid, check Covid PCR.  -Start vancomycin and Zosyn.    - SLP seen      Cirrhosis with findings to suggest portal HTN  Peripancreatic haziness may be exaggerated by respiratory motion. Pancreatitis not excluded.  - monitor   - continue lactulose for now   - lipase normal      Hypertension  -Resume home meds  hydralazine as needed  - holding diuretic      MARIA DE JESUS on CKD 3Evaluation of cryoglobulinemia has  been negative, SPEP negative, ZHANG negative.  Has been on torsemide 20 mg daily for CHF holding    -Avoid nephrotoxic's, IV hydration with NS, monitor BMP  - renal consulted      Elevated lactic acid-IV hydration  - not rechecked but lower  - will check this evening      Hyperglycemia-history of DM ,   - novolog sliding scale      Remote fractures right superior and inferior pubic rami. Difficult to exclude underlying lytic lesion/pathologic fracture of the right superior pubic ramus.   Bilateral sacral insufficiency fractures.  - will tag Ortho    VTE prophylaxis:  Pneumatic Compression Devices  DIET: DM   Drains/Lines: none  Weight bearing status: WBAT   Disposition/Barriers to discharge: pending further work-up and findings   Code Status: Full code     Subjective:  confused    B/P: 132/60, T: 98.7, P: 73, R: 34     PERTINENT LABS  Recent Labs   Lab 12/12/21  1324 12/12/21  0812 12/12/21  0704 12/12/21  0329 12/11/21  2038 12/11/21  1834 12/11/21  1833   WBC  --   --   --   --   --  8.5  --    HGB  --   --   --   --   --  9.3*  --    MCV  --   --   --   --   --  76*  --    PLT  --   --   --   --   --  113*  --    INR  --   --   --   --   --   --  1.44*   NA  --   --   --   --   --  141  --    POTASSIUM  --   --   --   --   --  4.5  --    CHLORIDE  --   --   --   --   --  110*  --    CO2  --   --   --   --   --  20*  --    BUN  --   --   --   --   --  25  --    CR  --   --  2.01*  --   --  2.52*  --    ANIONGAP  --   --   --   --   --  11  --    VI  --   --   --   --   --  9.8  --    * 134*  --  138*  --  268*  --    ALBUMIN  --   --   --   --   --  2.8*  --    PROTTOTAL  --   --   --   --   --  6.6  --    BILITOTAL  --   --   --   --   --  2.4*  --    ALKPHOS  --   --   --   --   --  446*  --    ALT  --   --   --   --   --  28  --    AST  --   --   --   --   --  65*  --    LIPASE  --   --   --   --  66*  --   --      Recent Results (from the past 24 hour(s))   XR Chest Port 1 View    Narrative    EXAM: XR  CHEST PORT 1 VIEW  LOCATION: [facility]  DATE/TIME: 12/11/2021 6:08 PM    INDICATION: tachypnea  COMPARISON: 2/1/2021      Impression    IMPRESSION: Heart size upper limits of normal. Very slight interstitial prominence but no focal pneumonia. Tiny ovoid opacity at right costophrenic sulcus should represent granuloma.   Head CT w/o contrast    Narrative    EXAM: CT HEAD W/O CONTRAST  LOCATION: [facility]  DATE/TIME: 12/11/2021 9:35 PM    INDICATION: Altered mental status. Nonverbal.  COMPARISON: Brain MR 10/23/2012.  TECHNIQUE: Routine CT Head without IV contrast. Multiplanar reformats. Dose reduction techniques were used.    FINDINGS:  INTRACRANIAL CONTENTS: No intracranial hemorrhage, extraaxial collection, or mass effect. No CT evidence of acute infarct. Mild presumed chronic small vessel ischemic changes. Moderate generalized volume loss. No hydrocephalus.     VISUALIZED ORBITS/SINUSES/MASTOIDS: No intraorbital abnormality. No paranasal sinus mucosal disease. No middle ear or mastoid effusion.    BONES/SOFT TISSUES: No acute abnormality.      Impression    IMPRESSION:  1.  No CT evidence for acute intracranial process.  2.  Brain atrophy and presumed chronic microvascular ischemic changes as above.   CT Chest Abdomen Pelvis w/o Contrast    Narrative    EXAM: CT CHEST ABDOMEN PELVIS W/O CONTRAST  LOCATION: [facility]  DATE/TIME: 12/11/2021 9:35 PM    INDICATION: weakness, tachypnea, elevated lft's  COMPARISON: None.  TECHNIQUE: CT scan of the chest, abdomen, and pelvis was performed without IV contrast. Multiplanar reformats were obtained. Dose reduction techniques were used.   CONTRAST: None.    FINDINGS:   LUNGS AND PLEURA: Respiratory motion. Mild mosaic attenuation which can be seen with air trapping/small airway disease. Atelectasis or infiltrate right lung base and small right pleural effusion. Reticulonodular infiltrate within the  right middle and   lower lobes.    MEDIASTINUM/AXILLAE: Calcified mediastinal lymph nodes. Atherosclerotic aorta. Small hiatal hernia.    CORONARY ARTERY CALCIFICATION: Moderate..    HEPATOBILIARY: Hepatic cyst. Subcentimeter hepatic hypodensity near the gallbladder fossa small for characterization. Cirrhosis.    PANCREAS: Peripancreatic haziness may be exaggerated by respiratory motion.    SPLEEN: Splenomegaly    ADRENAL GLANDS: Thickening.    KIDNEYS/BLADDER: Calcifications along the medullary region the kidneys suggesting medullary nephrocalcinosis. No hydronephrosis. Guerra within the bladder.     BOWEL: Right colonic wall thickening. Gastric wall thickening versus underdistention. Moderate amount of stool in the rectal region. Small amount of free fluid abdomen and pelvis.    LYMPH NODES: Normal.    VASCULATURE: Varices left upper quadrant and left retroperitoneum.    PELVIC ORGANS: Small amount of free fluid. Presacral edema.    MUSCULOSKELETAL: Degenerative change osseous structures. Bilateral sacral insufficiency fractures. Remote fractures of the right superior and inferior pubic rami. Lytic lesion in the right parasymphyseal region. Difficult to exclude pathologic fracture   of the right superior pubic ramus. This measures 2.2 x 3.2 cm series 5 image 275. Compression deformity T12. Remote rib fractures.      Impression    IMPRESSION:  1.  Reticulonodular infiltrates within the right middle and lower lobe could be due to infection. Follow-up to confirm resolution recommended to exclude any underlying pulmonary nodules.  2.  Small right pleural effusion.  3.  Cirrhosis and portal hypertension.  4.  Small amount of ascites.  5.  Peripancreatic haziness may be exaggerated by respiratory motion. Pancreatitis not excluded.  6.  Medullary nephrocalcinosis.  7.  Wall thickening of the right colon can be seen with portal hypertensive colopathy. Infectious or inflammatory colitis not excluded.  8.  Wall thickening  versus underdistention of the stomach.  9.  Remote fractures right superior and inferior pubic rami. Difficult to exclude underlying lytic lesion/pathologic fracture of the right superior pubic ramus.  10.  Bilateral sacral insufficiency fractures.   US Lower Extremity Venous Duplex Bilateral    Narrative    EXAM: US LOWER EXTREMITY VENOUS DUPLEX BILATERAL  LOCATION: [facility]  DATE/TIME: 2021 9:48 PM    INDICATION: swelling, pain  COMPARISON: None.  TECHNIQUE: Venous Duplex ultrasound of bilateral lower extremities with and without compression, augmentation and duplex. Color flow and spectral Doppler with waveform analysis performed.    FINDINGS: Exam includes the common femoral, femoral, popliteal veins as well as segmentally visualized deep calf veins and greater saphenous vein.     RIGHT: No deep vein thrombosis. No superficial thrombophlebitis. No popliteal cyst.    LEFT: No deep vein thrombosis. No superficial thrombophlebitis. No popliteal cyst.      Impression    IMPRESSION:  1.  No deep venous thrombosis in the bilateral lower extremities.   Echocardiogram Complete   Result Value    LVEF  60-65%    Narrative    [id number]  [id number]  [id number]  [id number]^EDDIE^[place] [address]     Name: RODOLFO ALONZO  MRN: [id number]  : 1950  Study Date: 2021 11:52 AM  Age: 71 yrs  Gender: Female  Patient Location: [facility]  Reason For Study: Other, Please Specify in Comments  Ordering Physician: ANAYELI MORGAN  Performed By: KANU     BSA: 1.7 m2  Height: 64 in  Weight: 151 lb  HR: 74  BP: 137/67 mmHg  ______________________________________________________________________________  Procedure  Definity (NDC #54948-959) given intravenously. Complete Echo Adult.  Technically difficult study. Poor acoustic windows.  ______________________________________________________________________________  Interpretation Summary     Left ventricular  function is normal.The ejection fraction is 60-65%.  Left ventricular diastolic function is normal.  No regional wall motion abnormalities noted.  Normal right ventricle size and systolic function.  No valve disease identified.  There is no comparison study available.  ______________________________________________________________________________  I      WMSI = 1.00     % Normal = 100     X - Cannot   0 -                      (2) - Mildly 2 -          Segments  Size  Interpret    Hyperkinetic 1 - Normal  Hypokinetic  Hypokinetic  1-2     small                                                     7 -          3-5      moderate  3 - Akinetic 4 -          5 -         6 - Akinetic Dyskinetic   6-14    large               Dyskinetic   Aneurysmal  w/scar       w/scar       15-16   diffuse     Left Ventricle  The left ventricle is normal in size. Left ventricular function is normal.The  ejection fraction is 60-65%. Suboptimal endocardial definition precludes  measurement of left ventricular wall thickness. Left ventricular diastolic  function is normal. No regional wall motion abnormalities noted.     Right Ventricle  Normal right ventricle size and systolic function.     Atria  Normal left atrial size. Right atrial size is normal. There is no color  Doppler evidence of an atrial shunt.     Mitral Valve  Mitral valve leaflets appear normal. There is no evidence of mitral stenosis  or clinically significant mitral regurgitation.     Tricuspid Valve  Tricuspid valve leaflets appear normal. There is no evidence of tricuspid  stenosis or clinically significant tricuspid regurgitation. Right ventricle  systolic pressure estimate normal. The right ventricular systolic pressure is  approximated at 24.7 mmHg plus the right atrial pressure.     Aortic Valve  The aortic valve is trileaflet. Aortic valve leaflets appear normal. There is  no evidence of aortic stenosis or clinically significant aortic regurgitation.     Pulmonic  Valve  The pulmonic valve is not well seen, but is grossly normal. This degree of  valvular regurgitation is within normal limits. There is trace pulmonic  valvular regurgitation.     Vessels  The aorta root is normal. Normal size ascending aorta. IVC diameter <2.1 cm  collapsing >50% with sniff suggests a normal RA pressure of 3 mmHg.     Pericardium  There is no pericardial effusion.     Rhythm  Sinus rhythm was noted.     ______________________________________________________________________________  MMode/2D Measurements & Calculations  Ao root diam: 3.0 cm  LVOT diam: 2.0 cm  LVOT area: 3.2 cm2     Time Measurements  MM HR: 67.0 BPM     Doppler Measurements & Calculations  MV E max tru: 70.3 cm/sec  MV A max tru: 99.0 cm/sec  MV E/A: 0.71  MV dec slope: 225.6 cm/sec2  MV dec time: 0.31 sec  Ao V2 max: 171.1 cm/sec  Ao max P.0 mmHg  Ao V2 mean: 118.4 cm/sec  Ao mean P.4 mmHg  Ao V2 VTI: 40.4 cm  CODY(I,D): 2.5 cm2  CODY(V,D): 2.4 cm2  LV V1 max P.8 mmHg  LV V1 max: 130.6 cm/sec  LV V1 VTI: 31.2 cm  SV(LVOT): 99.4 ml  SI(LVOT): 57.3 ml/m2  TR max tru: 248.4 cm/sec  TR max P.7 mmHg  AV Tru Ratio (DI): 0.76  CODY Index (cm2/m2): 1.4  E/E' av.3  Lateral E/e': 7.3  Medial E/e': 11.3     ______________________________________________________________________________  Report approved by: Jennifer Loyola 2021 01:20 PM             Rox Berrios MD  [facility] Medicine Service  [phone]

## 2021-12-12 NOTE — CONSULTS
ORTHOPEDIC CONSULTATION    CHIEF COMPLAINT: incidental finding of B/L sacral ala fractures      HISTORY OF PRESENT ILLNESS:  The patient is seen in orthopedic consultation at the request of Rox Angeles MD.  The patient is a 71 year old female with finding of b/l sacral ala fractures and healing B/L pubic rami fractures.  She speaks cambodian but is able to communicate in english.  She reports two recent falls but denies pain in her buttock/ pelvis.  Is ambulating at baseline level with walker and denies pain with ambulation/ weight bearing.      PAST MEDICAL HISTORY:   Past Medical History:   Diagnosis Date     Acute kidney failure (H)      Anxiety      Cerebral infarction (H)      Chronic kidney disease     stage III; with anemia     Diabetes (H)     Type II with neuropathy     Encephalopathy      Gastroesophageal reflux disease      Heart failure (H)     unspecified     Hydronephrosis      Hyperkalemia      Hypertension      Insomnia      Malnutrition (H)      Pleural effusion      Portal hypertension (H)      Seizures (H)      Transient cerebral ischemic attack        ALLERGIES:      Allergies   Allergen Reactions     Mirtazapine      Noted on [facility]  10/06/2021        MEDICATIONS ON ADMISSION:  Medications were reviewed.  They do include:   Medications Prior to Admission   Medication Sig Dispense Refill Last Dose     acetaminophen (TYLENOL) 325 MG tablet Take 650 mg by mouth 3 times daily   12/11/2021 at Unknown time     amLODIPine (NORVASC) 5 MG tablet Take 5 mg by mouth daily   12/11/2021 at Unknown time     calcium carbonate (OS-VI) 1500 (600 Ca) MG tablet Take 600 mg by mouth 2 times daily (with meals)   12/11/2021 at Unknown time     clopidogrel (PLAVIX) 75 MG tablet Take 75 mg by mouth daily   12/11/2021 at Unknown time     famotidine (PEPCID) 20 MG tablet Take 20 mg by mouth 2 times daily   12/11/2021 at Unknown time     Ferrous Sulfate 324 (65 Fe) MG TBEC Take  "324 mg by mouth daily   12/11/2021 at Unknown time     insulin aspart (NOVOLOG PEN) 100 UNIT/ML pen Give 8 units subcutaneous three times daily with meals. Hold for BS <90.     Sliding scale: 151-200 2 units  201--250: 4 units  251-300: 6 units  301-350: 8 units        insulin glargine (LANTUS PEN) 100 UNIT/ML pen Inject 18 Units Subcutaneous At Bedtime   Past Week at Unknown time     lactulose (CHRONULAC) 10 GM/15ML solution Take 15 mLs by mouth daily   12/11/2021 at Unknown time     levETIRAcetam (KEPPRA) 500 MG tablet Take 500 mg by mouth 2 times daily   12/11/2021 at Unknown time     methocarbamol (ROBAXIN) 500 MG tablet Take 500 mg by mouth 4 times daily as needed for muscle spasms   Unknown at Unknown time     metoprolol tartrate (LOPRESSOR) 25 MG tablet Take 25 mg by mouth 2 times daily   12/11/2021 at Unknown time     multivitamin w/minerals (THERA-VIT-M) tablet Take 1 tablet by mouth daily   12/11/2021 at Unknown time     rosuvastatin (CRESTOR) 20 MG tablet Take 20 mg by mouth daily   12/11/2021 at Unknown time     torsemide (DEMADEX) 20 MG tablet Take 20 mg by mouth daily   Unknown at Unknown time     traZODone (DESYREL) 50 MG tablet Take 50 mg by mouth nightly as needed for sleep   Unknown at Unknown time       SOCIAL HISTORY:   Social History     Tobacco Use     Smoking status: None     Smokeless tobacco: None   Substance Use Topics     Alcohol use: None     Drug use: None        FAMILY HISTORY:  History reviewed. No pertinent family history.    REVIEW OF SYSTEMS:   See Admission History and Physical     PHYSICAL EXAMINATION:    Temp:  [97.4  F (36.3  C)-98.7  F (37.1  C)] 98.2  F (36.8  C)  Pulse:  [62-97] 62  Resp:  [17-43] 20  BP: (118-167)/() 136/65  SpO2:  [95 %-100 %] 99 %    General: On examination, the patient is alert to person.  States that she is at \"[employer]\"  SKIN/HAIR/NAILS: normal   Pulses:  Dorsalis pedis pulses intact  Sensation: intact bilateral lower extremities  Tenderness: no " tenderness about bony pelvis/ sacrum  ROM: no pain with PROM of the hips  Crepitus: none  Motor: wiggles toes, PF/ DF at ankle intact.  Able to flex/ extend knee and flex hip  Contralateral side= Full range of motion, Negative joint instability findings, 5/5 motor groups about the joint, Non-tender.     RADIOGRAPHIC EVALUATION:  CT reviewed.  Healing right superior/ inferior pubic rami fractures.  B/L sacral alar insuffiencey fractures.       LABORATORY DATA:   Lab Results   Component Value Date    INR 1.44 (H) 12/11/2021       IMPRESSION:  Healing right superior/ inferior pubic rami fractures  Sacral ala insuffiencey fractures     PLAN:  Her superior pubic ramus fracture on CT appears to have a robust callus developing.  Pathologic fracture is felt to be less likley to me, however if there is concern re: malignancy from medical perspective a biopsy could be considered.  Defer this to admitting team.  Her sacral insuffiencey fractures are asymptomatic/ minimally symptomatic. No evidence for pelvic instability.  Con't PT/OT.  WBAT.  Orthopedics will sign off.  Please let us know if you have additional questions.     Lexx Rasheed, DO  [place]

## 2021-12-13 ENCOUNTER — APPOINTMENT (OUTPATIENT)
Dept: NEUROLOGY | Facility: HOSPITAL | Age: 71
DRG: 193 | End: 2021-12-13
Attending: PSYCHIATRY & NEUROLOGY
Payer: COMMERCIAL

## 2021-12-13 ENCOUNTER — APPOINTMENT (OUTPATIENT)
Dept: PHYSICAL THERAPY | Facility: HOSPITAL | Age: 71
DRG: 193 | End: 2021-12-13
Attending: INTERNAL MEDICINE
Payer: COMMERCIAL

## 2021-12-13 LAB
ABO/RH(D): NORMAL
ALBUMIN SERPL-MCNC: 1.9 G/DL (ref 3.5–5)
ALP SERPL-CCNC: 276 U/L (ref 45–120)
ALT SERPL W P-5'-P-CCNC: 16 U/L (ref 0–45)
ANION GAP SERPL CALCULATED.3IONS-SCNC: 6 MMOL/L (ref 5–18)
ANTIBODY SCREEN: NEGATIVE
AST SERPL W P-5'-P-CCNC: 38 U/L (ref 0–40)
BILIRUB SERPL-MCNC: 1.4 MG/DL (ref 0–1)
BLD PROD TYP BPU: NORMAL
BLOOD COMPONENT TYPE: NORMAL
BUN SERPL-MCNC: 18 MG/DL (ref 8–28)
CALCIUM SERPL-MCNC: 8.2 MG/DL (ref 8.5–10.5)
CHLORIDE BLD-SCNC: 120 MMOL/L (ref 98–107)
CO2 SERPL-SCNC: 21 MMOL/L (ref 22–31)
CODING SYSTEM: NORMAL
CREAT SERPL-MCNC: 1.73 MG/DL (ref 0.6–1.1)
CROSSMATCH: NORMAL
ERYTHROCYTE [DISTWIDTH] IN BLOOD BY AUTOMATED COUNT: 15.2 % (ref 10–15)
GFR SERPL CREATININE-BSD FRML MDRD: 29 ML/MIN/1.73M2
GLUCOSE BLD-MCNC: 86 MG/DL (ref 70–125)
GLUCOSE BLDC GLUCOMTR-MCNC: 110 MG/DL (ref 70–99)
GLUCOSE BLDC GLUCOMTR-MCNC: 186 MG/DL (ref 70–99)
GLUCOSE BLDC GLUCOMTR-MCNC: 234 MG/DL (ref 70–99)
GLUCOSE BLDC GLUCOMTR-MCNC: 234 MG/DL (ref 70–99)
GLUCOSE BLDC GLUCOMTR-MCNC: 264 MG/DL (ref 70–99)
HCT VFR BLD AUTO: 22.5 % (ref 35–47)
HGB BLD-MCNC: 6.9 G/DL (ref 11.7–15.7)
ISSUE DATE AND TIME: NORMAL
MAGNESIUM SERPL-MCNC: 1.9 MG/DL (ref 1.8–2.6)
MCH RBC QN AUTO: 23.9 PG (ref 26.5–33)
MCHC RBC AUTO-ENTMCNC: 30.7 G/DL (ref 31.5–36.5)
MCV RBC AUTO: 78 FL (ref 78–100)
PLATELET # BLD AUTO: 84 10E3/UL (ref 150–450)
POTASSIUM BLD-SCNC: 3.4 MMOL/L (ref 3.5–5)
PROT SERPL-MCNC: 4.7 G/DL (ref 6–8)
RBC # BLD AUTO: 2.89 10E6/UL (ref 3.8–5.2)
SODIUM SERPL-SCNC: 147 MMOL/L (ref 136–145)
SPECIMEN EXPIRATION DATE: NORMAL
UNIT ABO/RH: NORMAL
UNIT NUMBER: NORMAL
UNIT STATUS: NORMAL
UNIT TYPE ISBT: 6200
WBC # BLD AUTO: 3.1 10E3/UL (ref 4–11)

## 2021-12-13 PROCEDURE — 120N000001 HC R&B MED SURG/OB

## 2021-12-13 PROCEDURE — 250N000013 HC RX MED GY IP 250 OP 250 PS 637: Performed by: STUDENT IN AN ORGANIZED HEALTH CARE EDUCATION/TRAINING PROGRAM

## 2021-12-13 PROCEDURE — 250N000011 HC RX IP 250 OP 636: Performed by: INTERNAL MEDICINE

## 2021-12-13 PROCEDURE — 250N000011 HC RX IP 250 OP 636: Performed by: STUDENT IN AN ORGANIZED HEALTH CARE EDUCATION/TRAINING PROGRAM

## 2021-12-13 PROCEDURE — 250N000013 HC RX MED GY IP 250 OP 250 PS 637: Performed by: NURSE PRACTITIONER

## 2021-12-13 PROCEDURE — 250N000013 HC RX MED GY IP 250 OP 250 PS 637: Performed by: INTERNAL MEDICINE

## 2021-12-13 PROCEDURE — 83735 ASSAY OF MAGNESIUM: CPT | Performed by: INTERNAL MEDICINE

## 2021-12-13 PROCEDURE — 97116 GAIT TRAINING THERAPY: CPT | Mod: GP

## 2021-12-13 PROCEDURE — 250N000012 HC RX MED GY IP 250 OP 636 PS 637: Performed by: INTERNAL MEDICINE

## 2021-12-13 PROCEDURE — 85027 COMPLETE CBC AUTOMATED: CPT | Performed by: INTERNAL MEDICINE

## 2021-12-13 PROCEDURE — 95819 EEG AWAKE AND ASLEEP: CPT

## 2021-12-13 PROCEDURE — 95816 EEG AWAKE AND DROWSY: CPT | Mod: 26 | Performed by: PSYCHIATRY & NEUROLOGY

## 2021-12-13 PROCEDURE — 86900 BLOOD TYPING SEROLOGIC ABO: CPT | Performed by: INTERNAL MEDICINE

## 2021-12-13 PROCEDURE — 99232 SBSQ HOSP IP/OBS MODERATE 35: CPT | Performed by: INTERNAL MEDICINE

## 2021-12-13 PROCEDURE — 86923 COMPATIBILITY TEST ELECTRIC: CPT | Performed by: INTERNAL MEDICINE

## 2021-12-13 PROCEDURE — 80053 COMPREHEN METABOLIC PANEL: CPT | Performed by: INTERNAL MEDICINE

## 2021-12-13 PROCEDURE — 99233 SBSQ HOSP IP/OBS HIGH 50: CPT | Mod: 25 | Performed by: PSYCHIATRY & NEUROLOGY

## 2021-12-13 PROCEDURE — 97162 PT EVAL MOD COMPLEX 30 MIN: CPT | Mod: GP

## 2021-12-13 PROCEDURE — P9016 RBC LEUKOCYTES REDUCED: HCPCS | Performed by: INTERNAL MEDICINE

## 2021-12-13 PROCEDURE — 36415 COLL VENOUS BLD VENIPUNCTURE: CPT | Performed by: INTERNAL MEDICINE

## 2021-12-13 RX ORDER — NICOTINE POLACRILEX 4 MG
15-30 LOZENGE BUCCAL
Status: DISCONTINUED | OUTPATIENT
Start: 2021-12-13 | End: 2021-12-14 | Stop reason: HOSPADM

## 2021-12-13 RX ORDER — DEXTROSE MONOHYDRATE 25 G/50ML
25-50 INJECTION, SOLUTION INTRAVENOUS
Status: DISCONTINUED | OUTPATIENT
Start: 2021-12-13 | End: 2021-12-14 | Stop reason: HOSPADM

## 2021-12-13 RX ORDER — POTASSIUM CHLORIDE 1500 MG/1
20 TABLET, EXTENDED RELEASE ORAL ONCE
Status: COMPLETED | OUTPATIENT
Start: 2021-12-13 | End: 2021-12-13

## 2021-12-13 RX ADMIN — AMLODIPINE BESYLATE 10 MG: 5 TABLET ORAL at 10:47

## 2021-12-13 RX ADMIN — FAMOTIDINE 20 MG: 20 TABLET, FILM COATED ORAL at 10:46

## 2021-12-13 RX ADMIN — ACETAMINOPHEN 650 MG: 325 TABLET ORAL at 19:59

## 2021-12-13 RX ADMIN — PIPERACILLIN SODIUM AND TAZOBACTAM SODIUM 3.38 G: 3; .375 INJECTION, POWDER, LYOPHILIZED, FOR SOLUTION INTRAVENOUS at 14:21

## 2021-12-13 RX ADMIN — INSULIN GLARGINE 18 UNITS: 100 INJECTION, SOLUTION SUBCUTANEOUS at 23:44

## 2021-12-13 RX ADMIN — LEVETIRACETAM 500 MG: 500 TABLET, FILM COATED ORAL at 19:59

## 2021-12-13 RX ADMIN — POTASSIUM CHLORIDE 20 MEQ: 1500 TABLET, EXTENDED RELEASE ORAL at 10:47

## 2021-12-13 RX ADMIN — PIPERACILLIN SODIUM AND TAZOBACTAM SODIUM 3.38 G: 3; .375 INJECTION, POWDER, LYOPHILIZED, FOR SOLUTION INTRAVENOUS at 22:57

## 2021-12-13 RX ADMIN — PIPERACILLIN SODIUM AND TAZOBACTAM SODIUM 3.38 G: 3; .375 INJECTION, POWDER, LYOPHILIZED, FOR SOLUTION INTRAVENOUS at 05:59

## 2021-12-13 RX ADMIN — HUMAN INSULIN 10 UNITS: 100 INJECTION, SUSPENSION SUBCUTANEOUS at 12:54

## 2021-12-13 RX ADMIN — METOPROLOL TARTRATE 25 MG: 25 TABLET, FILM COATED ORAL at 19:58

## 2021-12-13 RX ADMIN — VANCOMYCIN HYDROCHLORIDE 500 MG: 500 INJECTION, POWDER, LYOPHILIZED, FOR SOLUTION INTRAVENOUS at 00:32

## 2021-12-13 RX ADMIN — CLOPIDOGREL BISULFATE 75 MG: 75 TABLET ORAL at 10:47

## 2021-12-13 RX ADMIN — Medication 1 MG: at 19:58

## 2021-12-13 RX ADMIN — LEVETIRACETAM 500 MG: 500 TABLET, FILM COATED ORAL at 10:47

## 2021-12-13 ASSESSMENT — ACTIVITIES OF DAILY LIVING (ADL)
ADLS_ACUITY_SCORE: 13
ADLS_ACUITY_SCORE: 17
ADLS_ACUITY_SCORE: 13
ADLS_ACUITY_SCORE: 17
ADLS_ACUITY_SCORE: 13
ADLS_ACUITY_SCORE: 17
ADLS_ACUITY_SCORE: 17
ADLS_ACUITY_SCORE: 13
ADLS_ACUITY_SCORE: 12

## 2021-12-13 NOTE — PROGRESS NOTES
[facility]NAL ([facility]) progress note  CC: F/U MARIA DE JESUS  S: Since last visit, sitting up in bed. No distress. No sob or pain Reports improved PO intake. Wants to get up and walk some.     A/P:   Active Problems:    Benign essential hypertension    CKD (chronic kidney disease) stage 4, GFR 15-29 ml/min (H)    Diabetic nephropathy associated with type 2 diabetes mellitus (H)    Seizure disorder (H)    Hepatic cirrhosis due to chronic hepatitis C infection (H)    Hospital-acquired pneumonia    Cirrhosis of liver with ascites, unspecified hepatic cirrhosis type (H)    Metabolic encephalopathy    1. Acute Kidney Injury/CKD 3b- Baseline creatinine is 1.72 to 2, presented with creatinine of 2.52, this has decreased to 2.01 with IV fluid therapy.  She is followed by Dr. Call Nephrology with [facility].  Has baseline CKD 3b that was thought to be due to Diabetes, Hypertension, and Hep C cirrhosis.  Evaluation of cryoglobulinemia has been negative, SPEP negative, ZHANG negative.  Has been on torsemide 20 mg daily for CHF.       Seen with mckeon cath with about 250 ml of yellow urine today, CT of the abdomen with no hydro.  No recent IV contrast exposure, no NSAID use reported, and no ACE/ARB.  I suspect her MARIA DE JESUS on CKD is due to combination of pre-renal dehydration in setting of intravascular volume depletion with added over-diuresis.  Bilateral lower extremity ultrasound with no DVT.     Plan is to avoid any further renal insult by renally dosing all medications and avoiding nephrotoxic medications.  Avoid ACE inhibitors/ARB, Aminoglycosides/ IV constrast/  NSAID if possible  :              -Accurate I/O              -Daily weight   -Stop IVF.                2. Altered mental Status: IMproving.  UA negative,  Wbc 8.5.  EKG negative. CT of the head with no intracranial process.  Blood culture pending.  Chest CT with possible infiltrate with small right pleural effusion.  Also noted to have small ascites.  Ammonia slightly elevated at  "44.  BNP not elevated.  Treatment per IM.             -Alert and oriented during my visit today, this is after abx and fluid therapy.            -Confusion likely due to elevated ammonia in setting of hep c, dehydration and possible infection.          3. Blood Pressure/Volume:  Patient with long standing history of hypertension.  Note reviewed showed past +ZHANG with hydralazine therapy.  Blood pressure mildly hypertensive today .  Was on amlodipine 5 mg daily, metoprolol 25 mg bid, and torsemide 20 mg daily.              -Hold Torsemide and continue amlodipine and metoprolol.  Will increase amlodipine to 10 mg for now.              -Avoid over-correction of blood pressure, allow mild hyper-tension to help with renal perfusion.       4. Anemia: MARIA DE JESUS, chronic illness, and cirrhosis all possibly contributing (target hgb of 10-11).  Hgb at 9.3, platelet at 113.  Patient is certainly at risk for GI bleed especially with cirrhosis and CKD.                 - Monitor CBC      5. Diabetes: A1C at 7.8. On insulin, treatment per IM.       Lane Cortez,   [facility]  [phone] (off)      No interval changes to past medical history, social history or family history to report.    /59 (BP Location: Right arm)   Pulse 59   Temp 98  F (36.7  C) (Oral)   Resp 16   Ht 1.626 m (5' 4.02\")   Wt 68.5 kg (151 lb)   SpO2 96%   BMI 25.91 kg/m      I/O last 3 completed shifts:  In: 2777 [P.O.:290; I.V.:2487]  Out: 1525 [Urine:1525]    Physical Exam:   GENERAL: calm and comfortable, alert  EYES: pupils equal, sclerae not icteric.  ENT:  oral mucosa moist.  RESP: no respiratory distress, normal effort.  CV: no leg edema.    GI:  NT/ND,   : No CVA tenderness   SKIN: No rash, warm/ dry  NEURO: Moves all extremities, no tremor.  PSYCH: Alert and oriented x 3t    Most Recent 3 CBC's:Recent Labs   Lab Test 12/13/21  0730 12/11/21  1834 10/22/21  0620   WBC 3.1* 8.5 2.2*   HGB 6.9* 9.3* 8.4*   MCV 78 76* " 80   PLT 84* 113* 102*     Most Recent 3 BMP's:Recent Labs   Lab Test 12/13/21  0730 12/12/21  1818 12/12/21  1601 12/12/21  0812 12/12/21  0704 12/12/21  0329 12/11/21  1834 10/22/21  0620   *  --   --   --   --   --  141 143   POTASSIUM 3.4*  --   --   --   --   --  4.5 3.3*   CHLORIDE 120*  --   --   --   --   --  110* 110*   CO2 21*  --   --   --   --   --  20* 21*   BUN 18  --   --   --   --   --  25 18   CR 1.73*  --   --   --  2.01*  --  2.52* 1.81*   ANIONGAP 6  --   --   --   --   --  11 12   VI 8.2*  --   --   --   --   --  9.8 9.2   GLC 86 329* 234*   < >  --    < > 268* 87    < > = values in this interval not displayed.     Most Recent 2 LFT's:Recent Labs   Lab Test 12/13/21  0730 12/11/21  1834   AST 38 65*   ALT 16 28   ALKPHOS 276* 446*   BILITOTAL 1.4* 2.4*     Most Recent 3 INR's:Recent Labs   Lab Test 12/11/21  1833   INR 1.44*     Most Recent 3 Creatinines:Recent Labs   Lab Test 12/13/21  0730 12/12/21  0704 12/11/21  1834   CR 1.73* 2.01* 2.52*     Most Recent 3 Hemoglobins:Recent Labs   Lab Test 12/13/21  0730 12/11/21  1834 10/22/21  0620   HGB 6.9* 9.3* 8.4*     Most Recent 3 Troponin's:No lab results found.  Most Recent 3 BNP's:No lab results found.  Most Recent D-dimer:Recent Labs   Lab Test 12/11/21  1833   DD 1.04*     Most Recent Cholesterol Panel:No lab results found.      Current Facility-Administered Medications:      acetaminophen (TYLENOL) tablet 650 mg, 650 mg, Oral, Q6H PRN **OR** acetaminophen (TYLENOL) Suppository 650 mg, 650 mg, Rectal, Q6H PRN, Lexx Hall MD     amLODIPine (NORVASC) tablet 10 mg, 10 mg, Oral, Daily, Henny Flores, APRN CNP     clopidogrel (PLAVIX) tablet 75 mg, 75 mg, Oral, Daily, Rox Berrios MD, 75 mg at 12/12/21 1331     glucose gel 15-30 g, 15-30 g, Oral, Q15 Min PRN **OR** dextrose 50 % injection 25-50 mL, 25-50 mL, Intravenous, Q15 Min PRN **OR** glucagon injection 1 mg, 1 mg, Subcutaneous, Q15 Min PRN, Rox Berrios MD      glucose gel 15-30 g, 15-30 g, Oral, Q15 Min PRN **OR** dextrose 50 % injection 25-50 mL, 25-50 mL, Intravenous, Q15 Min PRN **OR** glucagon injection 1 mg, 1 mg, Subcutaneous, Q15 Min PRN, Lexx Hall MD     famotidine (PEPCID) tablet 20 mg, 20 mg, Oral, Q48H, Rox Berrios MD     insulin aspart (NovoLOG) injection (RAPID ACTING), 1-10 Units, Subcutaneous, TID AC, Rox Berrios MD     insulin glargine (LANTUS PEN) injection 18 Units, 18 Units, Subcutaneous, At Bedtime, Rox Berrios MD     insulin NPH injection 10 Units, 10 Units, Subcutaneous, Once, Rox Berrios MD     lactulose (CHRONULAC) solution 10 g, 10 g, Oral, TID, Lexx Hall MD, 10 g at 12/12/21 1731     levETIRAcetam (KEPPRA) tablet 500 mg, 500 mg, Oral, BID, Rox Berrios MD, 500 mg at 12/12/21 2046     lidocaine (LMX4) cream, , Topical, Q1H PRN, Lexx Hall MD     lidocaine (XYLOCAINE) 2 % external gel 20 mL, 20 mL, Urethral, Once PRN, Mercedez Zepeda MD     lidocaine 1 % 0.1-1 mL, 0.1-1 mL, Other, Q1H PRN, Lexx Hall MD     melatonin tablet 1 mg, 1 mg, Oral, At Bedtime PRN, Lexx Hall MD, 1 mg at 12/12/21 1812     metoprolol tartrate (LOPRESSOR) tablet 25 mg, 25 mg, Oral, BID, Rox Berrios MD, 25 mg at 12/12/21 2046     ondansetron (ZOFRAN-ODT) ODT tab 4 mg, 4 mg, Oral, Q6H PRN **OR** ondansetron (ZOFRAN) injection 4 mg, 4 mg, Intravenous, Q6H PRN, Lexx Hall MD     piperacillin-tazobactam (ZOSYN) 3.375 g vial to attach to  mL bag, 3.375 g, Intravenous, Q8H, Lexx Hall MD, 3.375 g at 12/13/21 0559     sodium chloride (PF) 0.9% PF flush 3 mL, 3 mL, Intracatheter, Q8H, Lexx Hall MD, 3 mL at 12/12/21 1732     sodium chloride (PF) 0.9% PF flush 3 mL, 3 mL, Intracatheter, q1 min prn, Lexx Hall MD     sodium chloride (PF) 0.9% PF flush 3 mL, 3 mL, Intracatheter, q1 min prn, Mercedez Zepeda MD     sodium chloride (PF) 0.9% PF flush 3 mL, 3 mL, Intracatheter,  Q8H, Mercedez Zepeda MD, 3 mL at 12/11/21 1931     [COMPLETED] 0.9% sodium chloride BOLUS, 1,000 mL, Intravenous, Once, Stopped at 12/11/21 2113 **FOLLOWED BY** sodium chloride 0.9% infusion, , Intravenous, Continuous, Henny Flores APRN CNP, Last Rate: 50 mL/hr at 12/13/21 0553, Rate Verify at 12/13/21 0553     [COMPLETED] vancomycin (VANCOCIN) 1,500 mg in sodium chloride 0.9 % 250 mL intermittent infusion, 1,500 mg, Intravenous, Q24H, Stopped at 12/12/21 0830 **FOLLOWED BY** vancomycin (VANCOCIN) 500 mg vial to attach to  mL bag, 500 mg, Intravenous, Q18H, Rox Berrios MD, Last Rate: 100 mL/hr at 12/13/21 0032, 500 mg at 12/13/21 0032    Drug and lactation database from the [place] [employer]:  http://toxnet.nlm.nih.gov/cgi-bin/sis/htmlgen?LACT        Labs personally reviewed today during this evaluation at 8:46 AM

## 2021-12-13 NOTE — PLAN OF CARE
Problem: Adult Inpatient Plan of Care  Goal: Optimal Comfort and Wellbeing  Outcome: Improving     Problem: Adult Inpatient Plan of Care  Goal: Readiness for Transition of Care  Outcome: Improving     Pt disoriented but pleasant. NIH score 2. Purwick in place when in bed. Ls clear. Bs active. Colostomy bag changed.

## 2021-12-13 NOTE — PLAN OF CARE
Problem: Risk for Delirium  Goal: Optimal Coping  Outcome: Improving     Problem: Risk for Delirium  Goal: Improved Attention and Thought Clarity  Outcome: Improving     Pt had large incontinent BM. Guerra in place, clear yellow urine. Guerra cares done after BM. Up with walker assist of 1. Blood sugar 253 and given coverage of 2 units. Lactulose given. NS infusing at 50 ml/hr. Pt requested sleep aid. Given Melatonin 1mg prn.

## 2021-12-13 NOTE — PROGRESS NOTES
Bedside EEG was performed that included photic, auditory, and sensory stimulation. Hyperventilation was not possible given the patient's clinical state.   EEG # .  EEG [id number] system used.

## 2021-12-13 NOTE — PROGRESS NOTES
[facility]    PROGRESS NOTE - Hospitalist Service    Assessment and Plan    Active Problems:    Benign essential hypertension    CKD (chronic kidney disease) stage 4, GFR 15-29 ml/min (H)    Diabetic nephropathy associated with type 2 diabetes mellitus (H)    Seizure disorder (H)    Hepatic cirrhosis due to chronic hepatitis C infection (H)    Hospital-acquired pneumonia    Cirrhosis of liver with ascites, unspecified hepatic cirrhosis type (H)    Metabolic encephalopathy    Leon Sweeney is a 71 year old old female with h/o h/o , CKD, hypertension and type II DM brought for altered mental state.  Family called EMS as patient was changed from her baseline.  Per patient's daughter patient was acting weird since 12/11/20/2020 1 in the afternoon, taking her clothes off, pacing , not speaking to family members, and refusing medications.  She recently got out of TCU in October after hospitalization to [facility] following a fall in September.     Altered mental state/metabolic encephalopathy improved per family   - CT head unremarkable for acute changes, elevated ammonia and infiltrates on CT chest.    - Ammonia slightly up but not that significantly elevated to cause confusion  - with seizure history  Question if post-ictal?? Neurology consulted  - continue home Keppra dose  - EEG   - may be from infection and treating  - PT/OT     Healthcare associated pneumonia  -CT chest reticulonodular infiltrates within the right middle lower lobes.  Fully vaccinated for Covid, check Covid PCR.  -Start vancomycin and Zosyn.    - SLP seen      Cirrhosis with findings to suggest portal HTN  Peripancreatic haziness may be exaggerated by respiratory motion. Pancreatitis not excluded.  - monitor Lfts  - continue lactulose for now   - lipase normal      Hypertension  -continue amlodipine, metoprolol. Renal increased amlodipine  - hydralazine as needed  - holding torsemide      MARIA DE JESUS on CKD 3Evaluation of  cryoglobulinemia has been negative, SPEP negative, ZHANG negative.  Has been on torsemide 20 mg daily for CHF holding    -Avoid nephrotoxic's  - renal consulted appreciate assistance  - trend renal function      Elevated lactic acid-IV hydration  - not rechecked but given IVF   - recheck this morning      Hyperglycemia-history of DM ,   - novolog sliding scale   - adjusted insulin       Remote fractures right superior and inferior pubic rami. Difficult to exclude underlying lytic lesion/pathologic fracture of the right superior pubic ramus.   Bilateral sacral insufficiency fractures.  - Appreciate ortho seeing  - PT/OT  - SPEP UPEP     Anemia combination of CKD and cirhosis   - no sign of bleeding  - Hgb 6.9 today   - consented for blood  - getting one unit today andcan recheck in am    COVID-19 PCR Results    COVID-19 PCR Results 12/11/21   SARS CoV2 PCR Negative      Comments are available for some flowsheets but are not being displayed.         COVID-19 Antibody Results, Testing for Immunity    COVID-19 Antibody Results, Testing for Immunity   No data to display.            VTE prophylaxis:  Pneumatic Compression Devices  DIET: Orders Placed This Encounter      Combination Diet Moderate Consistent Carb (60 g CHO per Meal) Diet    Drains/Lines: none  Weight bearing status: WBAT  Disposition/Barriers to discharge: pending breathing and improvement possibly 102 days   Code Status: Full Code    Subjective:  Eager to go home, still mild sob, denies black or bloody stools     PHYSICAL EXAM  Temp:  [97.9  F (36.6  C)-98.7  F (37.1  C)] 98  F (36.7  C)  Pulse:  [59-73] 59  Resp:  [16-34] 16  BP: (118-136)/(57-65) 125/59  SpO2:  [96 %-100 %] 96 %  Wt Readings from Last 1 Encounters:   12/11/21 68.5 kg (151 lb)       Intake/Output Summary (Last 24 hours) at 12/13/2021 0747  Last data filed at 12/13/2021 0600  Gross per 24 hour   Intake 2777 ml   Output 1525 ml   Net 1252 ml      Body mass index is 25.91 kg/m .    Physical  Exam  Vitals reviewed.   Constitutional:       Appearance: Normal appearance.   HENT:      Head: Normocephalic and atraumatic.   Cardiovascular:      Rate and Rhythm: Normal rate and regular rhythm.      Pulses: Normal pulses.      Heart sounds: Normal heart sounds.   Pulmonary:      Comments: Tachypneic, trace bibasilar crackles no W/R  Neurological:      Mental Status: She is alert.       PERTINENT LABS/IMAGING:  Results for orders placed or performed during the hospital encounter of 12/11/21   XR Chest Port 1 View    Impression    IMPRESSION: Heart size upper limits of normal. Very slight interstitial prominence but no focal pneumonia. Tiny ovoid opacity at right costophrenic sulcus should represent granuloma.   CT Chest Abdomen Pelvis w/o Contrast    Impression    IMPRESSION:  1.  Reticulonodular infiltrates within the right middle and lower lobe could be due to infection. Follow-up to confirm resolution recommended to exclude any underlying pulmonary nodules.  2.  Small right pleural effusion.  3.  Cirrhosis and portal hypertension.  4.  Small amount of ascites.  5.  Peripancreatic haziness may be exaggerated by respiratory motion. Pancreatitis not excluded.  6.  Medullary nephrocalcinosis.  7.  Wall thickening of the right colon can be seen with portal hypertensive colopathy. Infectious or inflammatory colitis not excluded.  8.  Wall thickening versus underdistention of the stomach.  9.  Remote fractures right superior and inferior pubic rami. Difficult to exclude underlying lytic lesion/pathologic fracture of the right superior pubic ramus.  10.  Bilateral sacral insufficiency fractures.   Head CT w/o contrast    Impression    IMPRESSION:  1.  No CT evidence for acute intracranial process.  2.  Brain atrophy and presumed chronic microvascular ischemic changes as above.   US Lower Extremity Venous Duplex Bilateral    Impression    IMPRESSION:  1.  No deep venous thrombosis in the bilateral lower extremities.    Echocardiogram Complete   Result Value Ref Range    LVEF  60-65%      Most Recent 3 CBC's:Recent Labs   Lab Test 12/13/21 0730 12/11/21  1834 10/22/21  0620   WBC 3.1* 8.5 2.2*   HGB 6.9* 9.3* 8.4*   MCV 78 76* 80   PLT 84* 113* 102*     Most Recent 3 BMP's:Recent Labs   Lab Test 12/13/21  1302 12/13/21  1042 12/13/21 0730 12/12/21  0812 12/12/21  0704 12/12/21 0329 12/11/21  1834 10/22/21  0620   NA  --   --  147*  --   --   --  141 143   POTASSIUM  --   --  3.4*  --   --   --  4.5 3.3*   CHLORIDE  --   --  120*  --   --   --  110* 110*   CO2  --   --  21*  --   --   --  20* 21*   BUN  --   --  18  --   --   --  25 18   CR  --   --  1.73*  --  2.01*  --  2.52* 1.81*   ANIONGAP  --   --  6  --   --   --  11 12   VI  --   --  8.2*  --   --   --  9.8 9.2   * 110* 86   < >  --    < > 268* 87    < > = values in this interval not displayed.     Most Recent 2 LFT's:Recent Labs   Lab Test 12/13/21 0730 12/11/21  1834   AST 38 65*   ALT 16 28   ALKPHOS 276* 446*   BILITOTAL 1.4* 2.4*       No results for input(s): CHOL, HDL, LDL, TRIG, CHOLHDLRATIO in the last 90936 hours.  No results for input(s): LDL in the last 75543 hours.  Recent Labs   Lab Test 12/12/21  1818 12/12/21  0812 12/12/21  0704 12/12/21 0329 12/11/21  1834   NA  --   --   --   --  141   POTASSIUM  --   --   --   --  4.5   CHLORIDE  --   --   --   --  110*   CO2  --   --   --   --  20*   *   < >  --    < > 268*   BUN  --   --   --   --  25   CR  --   --  2.01*  --  2.52*   GFRESTIMATED  --   --  24*  --  18*   VI  --   --   --   --  9.8    < > = values in this interval not displayed.     Recent Labs   Lab Test 12/11/21 2029   A1C 7.8*     Recent Labs   Lab Test 12/11/21  1834 10/22/21  0620 10/21/21  0615   HGB 9.3* 8.4* 9.6*     Recent Labs   Lab Test 12/11/21 2038 12/11/21 1834   TROPONINI 0.04 0.03     Recent Labs   Lab Test 12/11/21 2029   BNP 73     Recent Labs   Lab Test 10/15/21  0640   TSH 2.60     Recent Labs   Lab Test  12/11/21  1833   INR 1.44*       Rox Berrios MD  [facility]  [phone]

## 2021-12-13 NOTE — PLAN OF CARE
Problem: Cognitive Impairment (Psychotic Signs/Symptoms)  Goal: Optimal Cognitive Function (Psychotic Signs/Symptoms)  12/13/2021 0549 by Rupinder Castro RN  Outcome: Improving    Problem: Infection (Pneumonia)  Goal: Resolution of Infection Signs and Symptoms  12/13/2021 0549 by Rupinder Castro, RN  Outcome: Improving    Problem: Respiratory Compromise (Pneumonia)  Goal: Effective Oxygenation and Ventilation  12/13/2021 0549 by Rupinder Castro, RN  Outcome: Improving  Patient alert, denies pain. Patient had a hard time falling asleep so slept on and off all shift. VSS. NS at 50ml. Medium loose BM x1 this shift. Guerra patent and in place with adequate output.

## 2021-12-13 NOTE — CONSULTS
Care Management Initial Consult    General Information  Assessment completed with: Patient,Children, patient and daughter, Audrey - via telephone  Type of CM/SW Visit: Initial Assessment    Primary Care Provider verified and updated as needed: Yes ([facility] in [place])   Readmission within the last 30 days: no previous admission in last 30 days      Reason for Consult: discharge planning  Advance Care Planning: Advance Care Planning Reviewed: no concerns identified          Communication Assessment  Patient's communication style: spoken language (English or Bilingual)    Hearing Difficulty or Deaf: no   Wear Glasses or Blind: yes    Cognitive  Cognitive/Neuro/Behavioral: WDL     Arousal Level: arouses to pain     Mood/Behavior: withdrawn,other (see comments) (shivering, appears to be cold)  Best Language:  (does not answer questions;  used)  Speech: SOUTH (unable to assess)    Living Environment:   People in home: child(asha), adult     Current living Arrangements: house      Able to return to prior arrangements: yes  Living Arrangement Comments:  (Patient has lived with daughter since September after a fall)    Family/Social Support:  Care provided by:    Provides care for:       Children          Description of Support System: Supportive,Involved    Support Assessment: Adequate family and caregiver support    Current Resources:   Patient receiving home care services: Yes  Skilled Home Care Services: Home Health Aid,Physicial Therapy   (she was discharged from nursing one week ago)  Community Resources: Home Care ([facility])  Equipment currently used at home: walker, rolling,cane, straight  Supplies currently used at home: Incontinence Supplies,Diabetic Supplies    Employment/Financial:  Employment Status:          Financial Concerns: No concerns identified           Lifestyle & Psychosocial Needs:  Social Determinants of Health     Tobacco Use: Unknown     Smoking Tobacco Use: Never  "Smoker     Smokeless Tobacco Use: Unknown   Alcohol Use: Not on file   Financial Resource Strain: Not on file   Food Insecurity: Not on file   Transportation Needs: Not on file   Physical Activity: Not on file   Stress: Not on file   Social Connections: Not on file   Intimate Partner Violence: Not on file   Depression: Not on file   Housing Stability: Not on file       Functional Status:  Prior to admission patient needed assistance:   Dependent ADLs:: Ambulation-walker  Dependent IADLs:: Transportation,Money Management,Medication Management,Meal Preparation,Shopping,Cooking       Mental Health Status:  Mental Health Status: No Current Concerns   - Patient states she does not want to go to her daughter's home because \"she argues and tells me what to do all the time\"    Chemical Dependency Status:  Chemical Dependency Status: No Current Concerns             Values/Beliefs:  Spiritual, Cultural Beliefs, Alevism Practices, Values that affect care:                 Additional Information:  Chart reviewed, Care Management (CM) initial admission assessment, and consult for discharge planning completed.    Met with patient to introduce self and review role of care management, progression of care, an possible need for services at discharge.  Patient states her discharge goal is to return to her own home where she was living alone.  She states she was independent with her ADL's but dependent upon family for transportation.  Patient uses walker for mobility.  Patient's daughter, Audrey ([phone]), called while writer was in patient room.  Patient gave writer permission to speak with daughter.  Audrey reports that patient had a fall in September then went to rehab at [facility].  She discharged from U to daughter,Audrey, home with home care services from [facility].  Audrey states that her mother can not return home and live alone.  Audrey would like home care services to continue when patient is discharged.  " Audrey will provide transportation home.  Provided contact information to daughter.    Writer called [facility] and verified services with , Ashanti ([phone]).  Patient was initially opened with skilled nursing, home health aide, physical therapy and occupational therapy.  Patient currently just receiving PT and HHA.  CM to keep Ashanti updated with discharge plan and date.     Tia Leal RN

## 2021-12-13 NOTE — PLAN OF CARE
Patient denies pain this shift.  Hgb 6.9, receiving 1 unit PRBC's.  Up with PT/OT.  SBA with walker, call light within reach.  Continue IV antibiotics.

## 2021-12-13 NOTE — PROGRESS NOTES
12/13/21 1300   Quick Adds   Type of Visit Initial PT Evaluation      Language could not reach Cambodian . patient speaks some english   Living Environment   People in home alone   Current Living Arrangements house   Home Accessibility stairs within home   Number of Stairs, Within Home, Primary   (did not specify but states split entry with railing)   Self-Care   Equipment Currently Used at Home walker, rolling;cane, straight   Disability/Function   Hearing Difficulty or Deaf no   Number of times patient has fallen within last six months 2  (states she feel 2 times in the bathroom)   General Information   Onset of Illness/Injury or Date of Surgery 12/11/21   Referring Physician Rox Berrios MD   Patient/Family Therapy Goals Statement (PT) go home, I do not want to go to my daughter's house, she always telling me what to do   General Observations per H&P: Leon Sweeney is a 71 year old old female with h/o , CKD, hypertension and type II DM brought for altered mental state   Cognition   Affect/Mental Status (Cognition) WNL   Follows Commands (Cognition) WNL   Pain Assessment   Patient Currently in Pain No  (states no pain at rest and/or with activity)   Range of Motion (ROM)   ROM Comment BLE WFL   Strength   Strength Comments BLE WFL   Bed Mobility   Bed Mobility No deficits identified   Transfers   Transfers No deficits identified   Gait/Stairs (Locomotion)   Distance in Feet (Required for LE Total Joints) 150   Clinical Impression   Criteria for Skilled Therapeutic Intervention yes, treatment indicated   PT Diagnosis (PT) impaired functional mobility   Influenced by the following impairments weakness   Functional limitations due to impairments gait   Clinical Presentation Stable/Uncomplicated   Clinical Presentation Rationale patient presents as medically diagnosed   Clinical Decision Making (Complexity) low complexity   Therapy Frequency (PT) Daily   Predicted Duration of Therapy  Intervention (days/wks) 5 days   Planned Therapy Interventions (PT) gait training;stair training   Anticipated Equipment Needs at Discharge (PT) cane, straight   Risk & Benefits of therapy have been explained patient   PT Discharge Planning    PT Discharge Recommendation (DC Rec) home;home with home care physical therapy  (lives alone)   PT Rationale for DC Rec Recommend home with home PT from physical therapy standpoint- ambulating 150' with FWW but does report history of 2 falls at home so recommend home PT for home safety eval. Pt reports she does not want to go stay with her daughter. May benefit from home PT, home care and family to check in daily pending cognition/medical status.   Total Evaluation Time   Total Evaluation Time (Minutes) 15

## 2021-12-13 NOTE — PLAN OF CARE
Problem: Cognitive Impairment (Psychotic Signs/Symptoms)  Goal: Optimal Cognitive Function (Psychotic Signs/Symptoms)  Outcome: Improving     Problem: Infection (Pneumonia)  Goal: Resolution of Infection Signs and Symptoms  Outcome: Improving     Problem: Respiratory Compromise (Pneumonia)  Goal: Effective Oxygenation and Ventilation  Outcome: Improving   Patient is alert, able to express herself in English, denies pain. Prn melatonin given by previous nurse with little effect. VSS. Sating 100% on RA. NS running at 50ml/hr.

## 2021-12-13 NOTE — PROGRESS NOTES
"[facility]  Neurology Consultation - Progress Note    Patient Name:  Leon Sweeney  Date of Service:  December 13, 2021    Subjective:    Patient denies pain or acute events overnight except she had a hard time falling asleep.  No concerning nursing events noted.    Objective:    Vitals: /59 (BP Location: Right arm)   Pulse 59   Temp 98  F (36.7  C) (Oral)   Resp 16   Ht 1.626 m (5' 4.02\")   Wt 68.5 kg (151 lb)   SpO2 96%   BMI 25.91 kg/m    General: Lying in bed, NAD  Head: Atraumatic, normocephalic   Cardiac: no lower extremity edema  Neurologic:  Mental Status: Awake, alert, oriented to person, month, year.  Able to tell me her age.  Able to name most simple objects but did have trouble naming straw.  She does tell me that she would like to leave the hospital today.  Cranial Nerves: Pupils are reactive, and extraocular muscles are intact.  Smile is symmetric.  No dysarthria.  Tongue is midline.  Motor: No abnormal movements.  Moving all 4 extremities antigravity in approximately equally.  Does not appear to have full strength but is  4 out of 5 diffusely when gives good effort.  Sensory: Intact to light touch x 4 extremities   Reflexes upper extremity reflexes equal and symmetric.  Lower extremity reflexes decreased.      Pertinent Investigations:    I have personally reviewed most recent and pertinent labs, tests, and radiological images.   - Keppra level 46.8  Ammonia 44  Creatinine 2.5      Assessment  #Toxic metabolic encephalopahty  #hyperammonemia    Ms. Sweeney is a 71-year-old female with very complex medical history including end-stage renal disease and liver disease who neurology is consulted for encephalopathy.  My exam today is significantly improved from the H&P exam.  Patient is alert and responsive and answering simple questions for me.  Her neurologic exam is essentially nonfocal and her mentation has seemed to improved over her time in the hospital.  " Overall this fits best with toxic metabolic encephalopathy likely due to her underlying pneumonia, with possible contribution to mildly elevated ammonia.  EEG is being done this morning and is reasonable given the more abrupt change and her seizure history on Keppra.  Head CT is unremarkable.  However if this is unremarkable and improving I do not think additional neurologic work-up is needed.    Recommendations:   - Continue PTA Keppra, if mentation worsens or events recur can consider small dose decrease in future  - Will follow up results of EEG    Thank you for involving Neurology in the care of Leon POON Hodasarayotf.      Reed Mcdonough, DO    My total floor time today for this patient was at least 35 minutes, greater than half of which was for counseling and coordination of care

## 2021-12-13 NOTE — PHARMACY-VANCOMYCIN DOSING SERVICE
Vancomycin dosing update:    Serum creatinine improved from 2.52 to 2.01.  Anticipate continued improvement based on serum creatinine of 1.81 back in October of 2021.    New InsightRX Prediction:  Loading dose: N/A  Regimen: 500 mg IV every 18 hours.  Start time: 22:00 on 12/20/2021  Exposure target: AUC24 (range)400-600 mg/L.hr   AUC24,ss: 469 mg/L.hr  Probability of AUC24 > 400: 68 %  Ctrough,ss: 16.7 mg/L  Probability of Ctrough,ss > 20: 32 %  Probability of nephrotoxicity (Lodise JORGE 2009): 12 %    1. Change vancomycin to 500 mg IV q18h.   2. Vancomycin monitoring method: AUC  3. Vancomycin therapeutic monitoring goal: 400-600 mg*h/L  4. Pharmacy will check vancomycin levels as appropriate in 1-3 Days.    5. Serum creatinine levels will be ordered a minimum of twice weekly.      Pharmacy will continue to monitor.

## 2021-12-14 ENCOUNTER — APPOINTMENT (OUTPATIENT)
Dept: OCCUPATIONAL THERAPY | Facility: HOSPITAL | Age: 71
DRG: 193 | End: 2021-12-14
Payer: COMMERCIAL

## 2021-12-14 ENCOUNTER — APPOINTMENT (OUTPATIENT)
Dept: PHYSICAL THERAPY | Facility: HOSPITAL | Age: 71
DRG: 193 | End: 2021-12-14
Payer: COMMERCIAL

## 2021-12-14 VITALS
HEIGHT: 64 IN | WEIGHT: 151 LBS | HEART RATE: 54 BPM | RESPIRATION RATE: 18 BRPM | BODY MASS INDEX: 25.78 KG/M2 | DIASTOLIC BLOOD PRESSURE: 60 MMHG | OXYGEN SATURATION: 100 % | TEMPERATURE: 97.6 F | SYSTOLIC BLOOD PRESSURE: 131 MMHG

## 2021-12-14 PROBLEM — E87.20 LACTIC ACID ACIDOSIS: Status: ACTIVE | Noted: 2021-12-14

## 2021-12-14 PROBLEM — D69.6 THROMBOCYTOPENIA (H): Status: ACTIVE | Noted: 2021-12-14

## 2021-12-14 PROBLEM — E87.0 HYPERNATREMIA: Status: ACTIVE | Noted: 2021-12-14

## 2021-12-14 LAB
ALBUMIN SERPL-MCNC: 1.9 G/DL (ref 3.5–5)
ALP SERPL-CCNC: 299 U/L (ref 45–120)
ALT SERPL W P-5'-P-CCNC: 18 U/L (ref 0–45)
ANION GAP SERPL CALCULATED.3IONS-SCNC: 6 MMOL/L (ref 5–18)
AST SERPL W P-5'-P-CCNC: 39 U/L (ref 0–40)
BILIRUB SERPL-MCNC: 1.5 MG/DL (ref 0–1)
BUN SERPL-MCNC: 18 MG/DL (ref 8–28)
CALCIUM SERPL-MCNC: 8.6 MG/DL (ref 8.5–10.5)
CHLORIDE BLD-SCNC: 118 MMOL/L (ref 98–107)
CO2 SERPL-SCNC: 19 MMOL/L (ref 22–31)
CREAT SERPL-MCNC: 1.56 MG/DL (ref 0.6–1.1)
ERYTHROCYTE [DISTWIDTH] IN BLOOD BY AUTOMATED COUNT: 15.4 % (ref 10–15)
GFR SERPL CREATININE-BSD FRML MDRD: 33 ML/MIN/1.73M2
GLUCOSE BLD-MCNC: 79 MG/DL (ref 70–125)
GLUCOSE BLDC GLUCOMTR-MCNC: 123 MG/DL (ref 70–99)
GLUCOSE BLDC GLUCOMTR-MCNC: 140 MG/DL (ref 70–99)
GLUCOSE BLDC GLUCOMTR-MCNC: 180 MG/DL (ref 70–99)
GLUCOSE BLDC GLUCOMTR-MCNC: 195 MG/DL (ref 70–99)
GLUCOSE BLDC GLUCOMTR-MCNC: 76 MG/DL (ref 70–99)
HCT VFR BLD AUTO: 27 % (ref 35–47)
HGB BLD-MCNC: 8.4 G/DL (ref 11.7–15.7)
LACTATE SERPL-SCNC: 1.1 MMOL/L (ref 0.7–2)
MAGNESIUM SERPL-MCNC: 1.9 MG/DL (ref 1.8–2.6)
MCH RBC QN AUTO: 24.4 PG (ref 26.5–33)
MCHC RBC AUTO-ENTMCNC: 31.1 G/DL (ref 31.5–36.5)
MCV RBC AUTO: 79 FL (ref 78–100)
PLATELET # BLD AUTO: 77 10E3/UL (ref 150–450)
POTASSIUM BLD-SCNC: 3.3 MMOL/L (ref 3.5–5)
POTASSIUM BLD-SCNC: 3.5 MMOL/L (ref 3.5–5)
PROT SERPL-MCNC: 4.8 G/DL (ref 6–8)
RBC # BLD AUTO: 3.44 10E6/UL (ref 3.8–5.2)
SODIUM SERPL-SCNC: 143 MMOL/L (ref 136–145)
TOTAL PROTEIN SERUM FOR ELP: 5.5 G/DL (ref 6–8)
WBC # BLD AUTO: 2.8 10E3/UL (ref 4–11)

## 2021-12-14 PROCEDURE — 250N000013 HC RX MED GY IP 250 OP 250 PS 637: Performed by: STUDENT IN AN ORGANIZED HEALTH CARE EDUCATION/TRAINING PROGRAM

## 2021-12-14 PROCEDURE — 83605 ASSAY OF LACTIC ACID: CPT | Performed by: INTERNAL MEDICINE

## 2021-12-14 PROCEDURE — 250N000013 HC RX MED GY IP 250 OP 250 PS 637: Performed by: INTERNAL MEDICINE

## 2021-12-14 PROCEDURE — 94799 UNLISTED PULMONARY SVC/PX: CPT

## 2021-12-14 PROCEDURE — 999N000157 HC STATISTIC RCP TIME EA 10 MIN

## 2021-12-14 PROCEDURE — 82040 ASSAY OF SERUM ALBUMIN: CPT | Performed by: INTERNAL MEDICINE

## 2021-12-14 PROCEDURE — 99232 SBSQ HOSP IP/OBS MODERATE 35: CPT | Performed by: PSYCHIATRY & NEUROLOGY

## 2021-12-14 PROCEDURE — 99239 HOSP IP/OBS DSCHRG MGMT >30: CPT | Performed by: INTERNAL MEDICINE

## 2021-12-14 PROCEDURE — 97116 GAIT TRAINING THERAPY: CPT | Mod: GP

## 2021-12-14 PROCEDURE — 250N000011 HC RX IP 250 OP 636: Performed by: STUDENT IN AN ORGANIZED HEALTH CARE EDUCATION/TRAINING PROGRAM

## 2021-12-14 PROCEDURE — 250N000013 HC RX MED GY IP 250 OP 250 PS 637: Performed by: NURSE PRACTITIONER

## 2021-12-14 PROCEDURE — 85014 HEMATOCRIT: CPT | Performed by: INTERNAL MEDICINE

## 2021-12-14 PROCEDURE — 84132 ASSAY OF SERUM POTASSIUM: CPT | Performed by: INTERNAL MEDICINE

## 2021-12-14 PROCEDURE — 36415 COLL VENOUS BLD VENIPUNCTURE: CPT | Performed by: INTERNAL MEDICINE

## 2021-12-14 PROCEDURE — 97535 SELF CARE MNGMENT TRAINING: CPT | Mod: GO

## 2021-12-14 PROCEDURE — 83735 ASSAY OF MAGNESIUM: CPT | Performed by: INTERNAL MEDICINE

## 2021-12-14 RX ORDER — DOXYCYCLINE 100 MG/1
100 CAPSULE ORAL EVERY 12 HOURS
Qty: 10 CAPSULE | Refills: 0 | Status: SHIPPED | OUTPATIENT
Start: 2021-12-14 | End: 2021-12-19

## 2021-12-14 RX ORDER — ALBUTEROL SULFATE 90 UG/1
2 AEROSOL, METERED RESPIRATORY (INHALATION) 4 TIMES DAILY
Status: DISCONTINUED | OUTPATIENT
Start: 2021-12-14 | End: 2021-12-14 | Stop reason: HOSPADM

## 2021-12-14 RX ORDER — ALBUTEROL SULFATE 90 UG/1
2 AEROSOL, METERED RESPIRATORY (INHALATION) EVERY 4 HOURS PRN
Qty: 18 G | Refills: 0 | Status: SHIPPED | OUTPATIENT
Start: 2021-12-14

## 2021-12-14 RX ORDER — DOXYCYCLINE 100 MG/1
100 CAPSULE ORAL EVERY 12 HOURS SCHEDULED
Status: DISCONTINUED | OUTPATIENT
Start: 2021-12-14 | End: 2021-12-14 | Stop reason: HOSPADM

## 2021-12-14 RX ORDER — POTASSIUM CHLORIDE 1.5 G/1.58G
40 POWDER, FOR SOLUTION ORAL ONCE
Status: COMPLETED | OUTPATIENT
Start: 2021-12-14 | End: 2021-12-14

## 2021-12-14 RX ORDER — CEFDINIR 300 MG/1
300 CAPSULE ORAL DAILY
Status: DISCONTINUED | OUTPATIENT
Start: 2021-12-14 | End: 2021-12-14 | Stop reason: HOSPADM

## 2021-12-14 RX ORDER — CEFDINIR 300 MG/1
300 CAPSULE ORAL DAILY
Qty: 5 CAPSULE | Refills: 0 | Status: SHIPPED | OUTPATIENT
Start: 2021-12-14 | End: 2021-12-19

## 2021-12-14 RX ORDER — DOXYCYCLINE 100 MG/1
100 CAPSULE ORAL EVERY 12 HOURS SCHEDULED
Status: DISCONTINUED | OUTPATIENT
Start: 2021-12-14 | End: 2021-12-14

## 2021-12-14 RX ORDER — AZITHROMYCIN 250 MG/1
500 TABLET, FILM COATED ORAL DAILY
Status: DISCONTINUED | OUTPATIENT
Start: 2021-12-14 | End: 2021-12-14

## 2021-12-14 RX ADMIN — DOXYCYCLINE 100 MG: 100 CAPSULE ORAL at 10:06

## 2021-12-14 RX ADMIN — AMLODIPINE BESYLATE 10 MG: 5 TABLET ORAL at 09:08

## 2021-12-14 RX ADMIN — CEFDINIR 300 MG: 300 CAPSULE ORAL at 11:38

## 2021-12-14 RX ADMIN — METOPROLOL TARTRATE 25 MG: 25 TABLET, FILM COATED ORAL at 09:13

## 2021-12-14 RX ADMIN — ALBUTEROL SULFATE 2 PUFF: 90 AEROSOL, METERED RESPIRATORY (INHALATION) at 11:38

## 2021-12-14 RX ADMIN — PIPERACILLIN SODIUM AND TAZOBACTAM SODIUM 3.38 G: 3; .375 INJECTION, POWDER, LYOPHILIZED, FOR SOLUTION INTRAVENOUS at 06:13

## 2021-12-14 RX ADMIN — CLOPIDOGREL BISULFATE 75 MG: 75 TABLET ORAL at 09:08

## 2021-12-14 RX ADMIN — LEVETIRACETAM 500 MG: 500 TABLET, FILM COATED ORAL at 09:08

## 2021-12-14 RX ADMIN — ALBUTEROL SULFATE 2 PUFF: 90 AEROSOL, METERED RESPIRATORY (INHALATION) at 17:11

## 2021-12-14 RX ADMIN — Medication 5 MG: at 01:14

## 2021-12-14 RX ADMIN — POTASSIUM CHLORIDE FOR ORAL SOLUTION 40 MEQ: 1.5 POWDER, FOR SOLUTION ORAL at 11:37

## 2021-12-14 ASSESSMENT — ACTIVITIES OF DAILY LIVING (ADL)
ADLS_ACUITY_SCORE: 13
ADLS_ACUITY_SCORE: 17
ADLS_ACUITY_SCORE: 13

## 2021-12-14 NOTE — PLAN OF CARE
Physical Therapy Discharge Summary    Reason for therapy discharge:    Discharged to home with home therapy.    Progress towards therapy goal(s). See goals on Care Plan in [facility] electronic health record for goal details.  Goals met    Therapy recommendation(s):    Continued therapy is recommended.  Rationale/Recommendations:  to continue progression toward PLOF.    Niki Park, PT

## 2021-12-14 NOTE — PROGRESS NOTES
Patient's daughter, Audrey, finally called back saying that she is picking up patient but will be running late.

## 2021-12-14 NOTE — PLAN OF CARE
Occupational Therapy Discharge Summary    Reason for therapy discharge:    Discharged to home with home therapy.    Progress towards therapy goal(s). See goals on Care Plan in [facility] electronic health record for goal details.  Goals partially met.  Barriers to achieving goals:   discharge from facility.    Therapy recommendation(s):    Continued therapy is recommended.  Rationale/Recommendations:  Further OT for progression of ADL's/IADL's, and safety needs in independent living.

## 2021-12-14 NOTE — PROGRESS NOTES
Patient and writer have been trying to call patient's children with no success. Phones were not being answered. Writer and patient left . Daughter Franc called back but told writer that they cannot take patient to their home. Patient been very anxious and looking forward to being discharged.

## 2021-12-14 NOTE — PLAN OF CARE
"  Problem: Respiratory Compromise (Pneumonia)  Goal: Effective Oxygenation and Ventilation  Outcome: Improving   /58 (BP Location: Left arm)   Pulse 59   Temp 97.7  F (36.5  C) (Oral)   Resp 18   Ht 1.626 m (5' 4.02\")   Wt 68.5 kg (151 lb)   SpO2 97%   BMI 25.91 kg/m    LS are clear/diminished in bilateral lobes.  Oxygen saturations in the high 90's RA at rest.     Pt denies pain and discomfort.  Mari is patent of concentrated, maria a o/p.      "

## 2021-12-14 NOTE — PROGRESS NOTES
"            [facility] ([facility]) progress note  CC: F/U MARIA DE JESUS  S: no new complaints. No acute events. Creatinine improved. Discussed with Dr. Berrios.    A/P:     1. Acute Kidney Injury/CKD 3b- Baseline creatinine is 1.72 to 2, presented with creatinine of 2.52, this has decreased to 2.01 with IV fluid therapy.  She is followed by Dr. Call Nephrology with [facility].  Has baseline CKD 3b that was thought to be due to Diabetes, Hypertension, and Hep C cirrhosis.  Evaluation of cryoglobulinemia has been negative, SPEP negative, ZHANG negative.  Has been on torsemide 20 mg daily for CHF.    -MARIA DE JESUS resolved  -I'm fine with discharge  -follow-up with [facility] in 3-6 months.                2. Blood Pressure/Volume:  Patient with long standing history of hypertension. BP at goal.            -can restart torsemide at home dose of 20mg daily.       Jesus Manuel Bethea MD  [facility]  [phone] (off)      No interval changes to past medical history, social history or family history to report.    /62 (BP Location: Left arm)   Pulse 60   Temp 97.7  F (36.5  C) (Oral)   Resp 18   Ht 1.626 m (5' 4.02\")   Wt 68.5 kg (151 lb)   SpO2 96%   BMI 25.91 kg/m      I/O last 3 completed shifts:  In: 300 [I.V.:6]  Out: 700 [Urine:700]    Physical Exam:   GENERAL: NAD  EYES: pupils equal, sclerae not icteric.  ENT: MMM  RESP: no respiratory distress  CV: trace leg edema.    GI:  ND  : No CVA tenderness   NEURO: Moves all extremities, no tremor.    Most Recent 3 CBC's:  Recent Labs   Lab Test 12/14/21  0624 12/13/21  0730 12/11/21  1834   WBC 2.8* 3.1* 8.5   HGB 8.4* 6.9* 9.3*   MCV 79 78 76*   PLT 77* 84* 113*     Most Recent 3 BMP's:  Recent Labs   Lab Test 12/14/21  1010 12/14/21  0853 12/14/21  0624 12/14/21  0114 12/13/21  1042 12/13/21  0730 12/12/21  0812 12/12/21  0704 12/12/21  0329 12/11/21  1834   NA  --   --  143  --   --  147*  --   --   --  141   POTASSIUM 3.5  --  3.3*  --   --  3.4*  --   --   --  4.5 "   CHLORIDE  --   --  118*  --   --  120*  --   --   --  110*   CO2  --   --  19*  --   --  21*  --   --   --  20*   BUN  --   --  18  --   --  18  --   --   --  25   CR  --   --  1.56*  --   --  1.73*  --  2.01*  --  2.52*   ANIONGAP  --   --  6  --   --  6  --   --   --  11   VI  --   --  8.6  --   --  8.2*  --   --   --  9.8   GLC  --  76 79 140*   < > 86   < >  --    < > 268*    < > = values in this interval not displayed.     Most Recent 2 LFT's:  Recent Labs   Lab Test 12/14/21  0624 12/13/21  0730   AST 39 38   ALT 18 16   ALKPHOS 299* 276*   BILITOTAL 1.5* 1.4*     Most Recent 3 INR's:  Recent Labs   Lab Test 12/11/21  1833   INR 1.44*     Most Recent 3 Creatinines:  Recent Labs   Lab Test 12/14/21  0624 12/13/21  0730 12/12/21  0704   CR 1.56* 1.73* 2.01*     Most Recent 3 Hemoglobins:  Recent Labs   Lab Test 12/14/21  0624 12/13/21  0730 12/11/21  1834   HGB 8.4* 6.9* 9.3*     Most Recent 3 Troponin's:No lab results found.  Most Recent 3 BNP's:No lab results found.  Most Recent D-dimer:  Recent Labs   Lab Test 12/11/21  1833   DD 1.04*     Most Recent Cholesterol Panel:No lab results found.      Current Facility-Administered Medications:      0.9% sodium chloride BOLUS, 1-250 mL, Intravenous, Q1H PRN, Rox Berrios MD     acetaminophen (TYLENOL) tablet 650 mg, 650 mg, Oral, Q6H PRN, 650 mg at 12/13/21 1959 **OR** acetaminophen (TYLENOL) Suppository 650 mg, 650 mg, Rectal, Q6H PRN, Lexx Hall MD     albuterol (PROVENTIL HFA/VENTOLIN HFA) inhaler, 2 puff, Inhalation, 4x Daily, Rox Berrios MD     amLODIPine (NORVASC) tablet 10 mg, 10 mg, Oral, Daily, Henny Flores, APRN CNP, 10 mg at 12/14/21 0908     cefdinir (OMNICEF) capsule 300 mg, 300 mg, Oral, Daily, Rox Berrios MD     clopidogrel (PLAVIX) tablet 75 mg, 75 mg, Oral, Daily, Rox Berrios MD, 75 mg at 12/14/21 0908     glucose gel 15-30 g, 15-30 g, Oral, Q15 Min PRN **OR** dextrose 50 % injection 25-50 mL, 25-50 mL,  Intravenous, Q15 Min PRN **OR** glucagon injection 1 mg, 1 mg, Subcutaneous, Q15 Min PRN, Rox Berrios MD     glucose gel 15-30 g, 15-30 g, Oral, Q15 Min PRN **OR** dextrose 50 % injection 25-50 mL, 25-50 mL, Intravenous, Q15 Min PRN **OR** glucagon injection 1 mg, 1 mg, Subcutaneous, Q15 Min PRN, Lexx Hall MD     doxycycline hyclate (VIBRAMYCIN) capsule 100 mg, 100 mg, Oral, Q12H TRACI, Rox Berrios MD, 100 mg at 12/14/21 1006     famotidine (PEPCID) tablet 20 mg, 20 mg, Oral, Q48H, Rox Berrios MD, 20 mg at 12/13/21 1046     insulin aspart (NovoLOG) injection (RAPID ACTING), 1-10 Units, Subcutaneous, TID AC, Rox Berrios MD, 5 Units at 12/13/21 1709     insulin glargine (LANTUS PEN) injection 18 Units, 18 Units, Subcutaneous, At Bedtime, Rox Berrios MD, 18 Units at 12/13/21 2344     lactulose (CHRONULAC) solution 10 g, 10 g, Oral, TID, Lexx Hall MD, 10 g at 12/12/21 1731     levETIRAcetam (KEPPRA) tablet 500 mg, 500 mg, Oral, BID, Rox Berrios MD, 500 mg at 12/14/21 0908     lidocaine (LMX4) cream, , Topical, Q1H PRN, Lexx Hall MD     lidocaine (XYLOCAINE) 2 % external gel 20 mL, 20 mL, Urethral, Once PRN, Mercedez Zepeda MD     lidocaine 1 % 0.1-1 mL, 0.1-1 mL, Other, Q1H PRN, Lexx Hall MD     melatonin tablet 5 mg, 5 mg, Oral, At Bedtime PRN, Lexx Hall MD, 5 mg at 12/14/21 0114     metoprolol tartrate (LOPRESSOR) tablet 25 mg, 25 mg, Oral, BID, Rox Berrios MD, 25 mg at 12/14/21 0913     ondansetron (ZOFRAN-ODT) ODT tab 4 mg, 4 mg, Oral, Q6H PRN **OR** ondansetron (ZOFRAN) injection 4 mg, 4 mg, Intravenous, Q6H PRN, Lexx Hall MD     potassium chloride (KLOR-CON) Packet 40 mEq, 40 mEq, Oral, Once, Rox Berrios MD     sodium chloride (PF) 0.9% PF flush 3 mL, 3 mL, Intracatheter, Q8H, Lexx Hall MD, 3 mL at 12/14/21 0915     sodium chloride (PF) 0.9% PF flush 3 mL, 3 mL, Intracatheter, q1 min prn, Rafael  MD Lexx     sodium chloride (PF) 0.9% PF flush 3 mL, 3 mL, Intracatheter, q1 min prn, Mercedez Zepeda MD     sodium chloride (PF) 0.9% PF flush 3 mL, 3 mL, Intracatheter, Q8H, Mercedez Zepeda MD, 3 mL at 12/13/21 1900    Drug and lactation database from the [place] [employer]:  http://toxnet.nlm.nih.gov/cgi-bin/sis/htmlgen?LACT        Labs personally reviewed today during this evaluation at 8:46 AM

## 2021-12-14 NOTE — PLAN OF CARE
Problem: OT General Care Plan  Goal: Cognitive (OT)  Description: Cognitive (OT)  Outcome: Improving   Pt is alert and cooperative with her cares. Pt was emotional at the beginning of shift, saying that her children did not come to see her. Pt gave me the phone number for her son whom I called and left a brief message to call us back. Pt got a unit of blood today and will recheck in AM.

## 2021-12-14 NOTE — PROGRESS NOTES
Pt was not available for 16:00 round flutter treatment.  Pt was instructed in the afternoon and able to do.  Pt would only do one breath cycle.  Pt states she is going home and didn't want to finish.  Pt encouraged to continue aerobica upon discharge.

## 2021-12-14 NOTE — PLAN OF CARE
Patient denies pain this shift.  Transitioned to oral antibiotics.  Ambulating in acevedo.  Bed/chair alarm in place.  Anticipate discharge this evening.

## 2021-12-14 NOTE — DISCHARGE SUMMARY
[facility]  Hospitalist Discharge Summary      Date of Admission:  12/11/2021  Date of Discharge:  12/14/2021  Discharging Provider: Rox Berrios MD      Discharge Diagnoses   Active Problems:    Benign essential hypertension    CKD (chronic kidney disease) stage 4, GFR 15-29 ml/min (H)    Diabetic nephropathy associated with type 2 diabetes mellitus (H)    Seizure disorder (H)    Hepatic cirrhosis due to chronic hepatitis C infection (H)    Hospital-acquired pneumonia    Cirrhosis of liver with ascites, unspecified hepatic cirrhosis type (H)    Metabolic encephalopathy    Thrombocytopenia (H)    Lactic acid acidosis    Hypernatremia        Follow-ups Needed After Discharge   Follow-up Appointments     Follow-up and recommended labs and tests       Follow up with primary care provider, Provider Not In System, within 3-5   days, to evaluate medication change and for hospital follow- up. The   following labs/tests are recommended: BMP, Mag, LFTs, CBC. Needs repeat   CXR to for.    GI follow-up in 1-2 months  Nephrology in 4-6         {Additional follow-up instructions/to-do's for PCP  SPEP and UPEP    Unresulted Labs Ordered in the Past 30 Days of this Admission     Date and Time Order Name Status Description    12/13/2021  2:10 PM Protein Electrophoresis, Serum In process     12/11/2021  6:06 PM Blood Culture Line, venous Preliminary     12/11/2021  6:06 PM Blood Culture Peripheral Blood Preliminary       These results will be followed up by PCP    Discharge Disposition   Home with HC  Condition at discharge: Stable      Hospital Course   Leon Sweeney is a 71 year old old female with h/o h/o , CKD, hypertension and type II DM brought for altered mental state.  Family called EMS as patient was changed from her baseline.  Per patient's daughter patient was acting weird since 12/11/20/2020 1 in the afternoon, taking her clothes off, pacing , not speaking to family members, and refusing  medications.  She recently got out of TCU in October after hospitalization to [facility] following a fall in September.     Altered mental state/metabolic encephalopathy resolved likely combination of infection and possible elevated ammonia?  - CT head unremarkable for acute changes, elevated ammonia and infiltrates on CT chest.    - Ammonia slightly up but not that significantly elevated to cause confusion  - with seizure history  Question if post-ictal?? Neurology consulted no changes, outpatient neuro f/u   - continue home Keppra dose  - EEG done   - PT/OT and HC      Healthcare associated pneumonia  -CT chest reticulonodular infiltrates within the right middle lower lobes.  Fully vaccinated for Covid, check Covid PCR.  -Start vancomycin and Zosyn, stopped vanco 12/13. Stopped zosyn today and change to cefdinir and  doxycycline   - SLP seen no swallowing difficulties      Cirrhosis with findings to suggest portal HTN  Peripancreatic haziness may be exaggerated by respiratory motion. Pancreatitis not excluded.  - monitor Lfts  - continue lactulose   - lipase normal   - outpatient GI f/U     Hypertension  -continue amlodipine, metoprolol. Renal increased amlodipine  - resume torsemide per renal      MARIA DE JESUS on CKD 3Evaluation of cryoglobulinemia has been negative, SPEP negative, ZHANG negative.  Has been on torsemide 20 mg daily for CHF held initialy but ok to resume per renal   -Avoid nephrotoxic's  - renal consulted appreciate assistance   - outpatient f/u      Elevated lactic acid-IV hydration  - not rechecked but given IVF   - recheck stable      Hyperglycemia-history of DM ,   - resume home regiment       Remote fractures right superior and inferior pubic rami. Difficult to exclude underlying lytic lesion/pathologic fracture of the right superior pubic ramus.   Bilateral sacral insufficiency fractures.  - Appreciate ortho seeing  - PT/OT  - SPEP UPEP pending      Anemia combination of CKD and cirhosis   - no  sign of bleeding  - Hgb 6.9 yesterday and given 1 unit stable no signs of bleeding chronic   - consented for blood    Consultations This Hospital Stay   PHARMACY TO DOSE VANCO  SPEECH LANGUAGE PATH ADULT IP CONSULT  NEUROLOGY IP CONSULT  OCCUPATIONAL THERAPY ADULT IP CONSULT  PHYSICAL THERAPY ADULT IP CONSULT  ORTHOPEDIC SURGERY IP CONSULT  SOCIAL WORK IP CONSULT    Code Status   Full Code    Time Spent on this Encounter   I, Rox Berrios MD, personally saw the patient today and spent greater than 30 minutes discharging this patient.       Rox Berrios MD  [address] [address]  Phone: [phone]  Fax: [phone]  ______________________________________________________________________    Physical Exam   Vital Signs: Temp: 97.6  F (36.4  C) Temp src: Oral BP: 131/60 Pulse: 54   Resp: 18 SpO2: 100 % O2 Device: None (Room air)    Weight: 151 lbs 0 oz  Physical Exam  Constitutional:       Appearance: Normal appearance.   HENT:      Head: Normocephalic and atraumatic.   Cardiovascular:      Rate and Rhythm: Normal rate and regular rhythm.      Pulses: Normal pulses.      Heart sounds: Normal heart sounds.   Pulmonary:      Effort: Pulmonary effort is normal.      Breath sounds: Normal breath sounds.   Abdominal:      General: Bowel sounds are normal.      Palpations: Abdomen is soft.   Musculoskeletal:         General: Normal range of motion.   Skin:     General: Skin is warm and dry.   Neurological:      General: No focal deficit present.      Mental Status: She is alert and oriented to person, place, and time. Mental status is at baseline.            Primary Care Physician   Provider Not In System    Discharge Orders      Home care nursing referral      Follow-up and recommended labs and tests     Follow up with primary care provider, Provider Not In System, within 3-5 days, to evaluate medication change and for hospital follow- up. The  following labs/tests are recommended: BMP, Mag, LFTs, CBC. Needs repeat CXR to for.    GI follow-up in 1-2 months  Nephrology in 4-6     Activity    Your activity upon discharge: activity as tolerated     Reason for your hospital stay    Pneumonia and confusion     MD face to face encounter    Documentation of Face to Face and Certification for Home Health Services    I certify that patient: Leon Sweeney is under my care and that I, or a nurse practitioner or physician's assistant working with me, had a face-to-face encounter that meets the physician face-to-face encounter requirements with this patient on: December 14, 2021.    This encounter with the patient was in whole, or in part, for the following medical condition, which is the primary reason for home health care: weakness, Pneumonia .    I certify that, based on my findings, the following services are medically necessary home health services: Nursing, Physical Therapy, and HHA.    My clinical findings support the need for the above services because: Nurse is needed: To assess improvement after changes in medications or other medical regimen.. and Physical Therapy Services are needed to assess and treat the following functional impairments: weakness.    Further, I certify that my clinical findings support that this patient is homebound (i.e. absences from home require considerable and taxing effort and are for medical reasons or [employer] services or infrequently or of short duration when for other reasons) because: Leaving home is medically contraindicated for the following reason(s): Dyspnea on exertion that makes it so they cannot leave their home for needed services without clinical deterioration...    Based on the above findings. I certify that this patient is confined to the home and needs intermittent skilled nursing care, physical therapy and/or speech therapy.  The patient is under my care, and I have initiated the establishment of the plan of care.   This patient will be followed by a physician who will periodically review the plan of care.  Physician/Provider to provide follow up care: System, Provider Not In    Attending hospital physician (the Medicare certified [facility] provider): Rox Berrios MD  Physician Signature: See electronic signature associated with these discharge orders.  Date: 12/14/2021     Diet    Follow this diet upon discharge: Orders Placed This Encounter      Combination Diet Moderate Consistent Carb (60 g CHO per Meal) Diet       Significant Results and Procedures   Most Recent 3 CBC's:  Recent Labs   Lab Test 12/14/21  0624 12/13/21  0730 12/11/21  1834   WBC 2.8* 3.1* 8.5   HGB 8.4* 6.9* 9.3*   MCV 79 78 76*   PLT 77* 84* 113*     Most Recent 3 BMP's:  Recent Labs   Lab Test 12/14/21  1713 12/14/21  1418 12/14/21  1259 12/14/21  1010 12/14/21  0853 12/14/21  0624 12/13/21  1042 12/13/21  0730 12/12/21  0812 12/12/21  0704 12/12/21  0329 12/11/21  1834   NA  --   --   --   --   --  143  --  147*  --   --   --  141   POTASSIUM  --   --   --  3.5  --  3.3*  --  3.4*  --   --   --  4.5   CHLORIDE  --   --   --   --   --  118*  --  120*  --   --   --  110*   CO2  --   --   --   --   --  19*  --  21*  --   --   --  20*   BUN  --   --   --   --   --  18  --  18  --   --   --  25   CR  --   --   --   --   --  1.56*  --  1.73*  --  2.01*  --  2.52*   ANIONGAP  --   --   --   --   --  6  --  6  --   --   --  11   VI  --   --   --   --   --  8.6  --  8.2*  --   --   --  9.8   * 180* 123*  --    < > 79   < > 86   < >  --    < > 268*    < > = values in this interval not displayed.     Most Recent 2 LFT's:  Recent Labs   Lab Test 12/14/21  0624 12/13/21  0730   AST 39 38   ALT 18 16   ALKPHOS 299* 276*   BILITOTAL 1.5* 1.4*     Most Recent 3 INR's:  Recent Labs   Lab Test 12/11/21  1833   INR 1.44*     Most Recent 6 Bacteria Isolates From Any Culture (See EPIC Reports for Culture Details):No lab results found.,   Results for orders placed  or performed during the hospital encounter of 12/11/21   XR Chest Port 1 View    Narrative    EXAM: XR CHEST PORT 1 VIEW  LOCATION: [facility]  DATE/TIME: 12/11/2021 6:08 PM    INDICATION: tachypnea  COMPARISON: 2/1/2021      Impression    IMPRESSION: Heart size upper limits of normal. Very slight interstitial prominence but no focal pneumonia. Tiny ovoid opacity at right costophrenic sulcus should represent granuloma.   CT Chest Abdomen Pelvis w/o Contrast    Narrative    EXAM: CT CHEST ABDOMEN PELVIS W/O CONTRAST  LOCATION: [facility]  DATE/TIME: 12/11/2021 9:35 PM    INDICATION: weakness, tachypnea, elevated lft's  COMPARISON: None.  TECHNIQUE: CT scan of the chest, abdomen, and pelvis was performed without IV contrast. Multiplanar reformats were obtained. Dose reduction techniques were used.   CONTRAST: None.    FINDINGS:   LUNGS AND PLEURA: Respiratory motion. Mild mosaic attenuation which can be seen with air trapping/small airway disease. Atelectasis or infiltrate right lung base and small right pleural effusion. Reticulonodular infiltrate within the right middle and   lower lobes.    MEDIASTINUM/AXILLAE: Calcified mediastinal lymph nodes. Atherosclerotic aorta. Small hiatal hernia.    CORONARY ARTERY CALCIFICATION: Moderate..    HEPATOBILIARY: Hepatic cyst. Subcentimeter hepatic hypodensity near the gallbladder fossa small for characterization. Cirrhosis.    PANCREAS: Peripancreatic haziness may be exaggerated by respiratory motion.    SPLEEN: Splenomegaly    ADRENAL GLANDS: Thickening.    KIDNEYS/BLADDER: Calcifications along the medullary region the kidneys suggesting medullary nephrocalcinosis. No hydronephrosis. Guerra within the bladder.     BOWEL: Right colonic wall thickening. Gastric wall thickening versus underdistention. Moderate amount of stool in the rectal region. Small amount of free fluid abdomen and pelvis.    LYMPH NODES:  Normal.    VASCULATURE: Varices left upper quadrant and left retroperitoneum.    PELVIC ORGANS: Small amount of free fluid. Presacral edema.    MUSCULOSKELETAL: Degenerative change osseous structures. Bilateral sacral insufficiency fractures. Remote fractures of the right superior and inferior pubic rami. Lytic lesion in the right parasymphyseal region. Difficult to exclude pathologic fracture   of the right superior pubic ramus. This measures 2.2 x 3.2 cm series 5 image 275. Compression deformity T12. Remote rib fractures.      Impression    IMPRESSION:  1.  Reticulonodular infiltrates within the right middle and lower lobe could be due to infection. Follow-up to confirm resolution recommended to exclude any underlying pulmonary nodules.  2.  Small right pleural effusion.  3.  Cirrhosis and portal hypertension.  4.  Small amount of ascites.  5.  Peripancreatic haziness may be exaggerated by respiratory motion. Pancreatitis not excluded.  6.  Medullary nephrocalcinosis.  7.  Wall thickening of the right colon can be seen with portal hypertensive colopathy. Infectious or inflammatory colitis not excluded.  8.  Wall thickening versus underdistention of the stomach.  9.  Remote fractures right superior and inferior pubic rami. Difficult to exclude underlying lytic lesion/pathologic fracture of the right superior pubic ramus.  10.  Bilateral sacral insufficiency fractures.   Head CT w/o contrast    Narrative    EXAM: CT HEAD W/O CONTRAST  LOCATION: [facility]  DATE/TIME: 12/11/2021 9:35 PM    INDICATION: Altered mental status. Nonverbal.  COMPARISON: Brain MR 10/23/2012.  TECHNIQUE: Routine CT Head without IV contrast. Multiplanar reformats. Dose reduction techniques were used.    FINDINGS:  INTRACRANIAL CONTENTS: No intracranial hemorrhage, extraaxial collection, or mass effect. No CT evidence of acute infarct. Mild presumed chronic small vessel ischemic changes. Moderate generalized volume  loss. No hydrocephalus.     VISUALIZED ORBITS/SINUSES/MASTOIDS: No intraorbital abnormality. No paranasal sinus mucosal disease. No middle ear or mastoid effusion.    BONES/SOFT TISSUES: No acute abnormality.      Impression    IMPRESSION:  1.  No CT evidence for acute intracranial process.  2.  Brain atrophy and presumed chronic microvascular ischemic changes as above.   US Lower Extremity Venous Duplex Bilateral    Narrative    EXAM: US LOWER EXTREMITY VENOUS DUPLEX BILATERAL  LOCATION: [facility]  DATE/TIME: 2021 9:48 PM    INDICATION: swelling, pain  COMPARISON: None.  TECHNIQUE: Venous Duplex ultrasound of bilateral lower extremities with and without compression, augmentation and duplex. Color flow and spectral Doppler with waveform analysis performed.    FINDINGS: Exam includes the common femoral, femoral, popliteal veins as well as segmentally visualized deep calf veins and greater saphenous vein.     RIGHT: No deep vein thrombosis. No superficial thrombophlebitis. No popliteal cyst.    LEFT: No deep vein thrombosis. No superficial thrombophlebitis. No popliteal cyst.      Impression    IMPRESSION:  1.  No deep venous thrombosis in the bilateral lower extremities.   Echocardiogram Complete     Value    LVEF  60-65%    Narrative    [id number]  [id number]  [id number]  [id number]^EDDIE^[place] [address]     Name: RODOLFO ALONZO  MRN: [id number]  : 1950  Study Date: 2021 11:52 AM  Age: 71 yrs  Gender: Female  Patient Location: [facility]  Reason For Study: Other, Please Specify in Comments  Ordering Physician: ANAYELI MORGAN  Performed By: KANU     BSA: 1.7 m2  Height: 64 in  Weight: 151 lb  HR: 74  BP: 137/67 mmHg  ______________________________________________________________________________  Procedure  Definity (NDC #47856-854) given intravenously. Complete Echo Adult.  Technically difficult study. Poor acoustic  windows.  ______________________________________________________________________________  Interpretation Summary     Left ventricular function is normal.The ejection fraction is 60-65%.  Left ventricular diastolic function is normal.  No regional wall motion abnormalities noted.  Normal right ventricle size and systolic function.  No valve disease identified.  There is no comparison study available.  ______________________________________________________________________________  I      WMSI = 1.00     % Normal = 100     X - Cannot   0 -                      (2) - Mildly 2 -          Segments  Size  Interpret    Hyperkinetic 1 - Normal  Hypokinetic  Hypokinetic  1-2     small                                                     7 -          3-5      moderate  3 - Akinetic 4 -          5 -         6 - Akinetic Dyskinetic   6-14    large               Dyskinetic   Aneurysmal  w/scar       w/scar       15-16   diffuse     Left Ventricle  The left ventricle is normal in size. Left ventricular function is normal.The  ejection fraction is 60-65%. Suboptimal endocardial definition precludes  measurement of left ventricular wall thickness. Left ventricular diastolic  function is normal. No regional wall motion abnormalities noted.     Right Ventricle  Normal right ventricle size and systolic function.     Atria  Normal left atrial size. Right atrial size is normal. There is no color  Doppler evidence of an atrial shunt.     Mitral Valve  Mitral valve leaflets appear normal. There is no evidence of mitral stenosis  or clinically significant mitral regurgitation.     Tricuspid Valve  Tricuspid valve leaflets appear normal. There is no evidence of tricuspid  stenosis or clinically significant tricuspid regurgitation. Right ventricle  systolic pressure estimate normal. The right ventricular systolic pressure is  approximated at 24.7 mmHg plus the right atrial pressure.     Aortic Valve  The aortic valve is trileaflet. Aortic  valve leaflets appear normal. There is  no evidence of aortic stenosis or clinically significant aortic regurgitation.     Pulmonic Valve  The pulmonic valve is not well seen, but is grossly normal. This degree of  valvular regurgitation is within normal limits. There is trace pulmonic  valvular regurgitation.     Vessels  The aorta root is normal. Normal size ascending aorta. IVC diameter <2.1 cm  collapsing >50% with sniff suggests a normal RA pressure of 3 mmHg.     Pericardium  There is no pericardial effusion.     Rhythm  Sinus rhythm was noted.     ______________________________________________________________________________  MMode/2D Measurements & Calculations  Ao root diam: 3.0 cm  LVOT diam: 2.0 cm  LVOT area: 3.2 cm2     Time Measurements  MM HR: 67.0 BPM     Doppler Measurements & Calculations  MV E max tru: 70.3 cm/sec  MV A max tru: 99.0 cm/sec  MV E/A: 0.71  MV dec slope: 225.6 cm/sec2  MV dec time: 0.31 sec  Ao V2 max: 171.1 cm/sec  Ao max P.0 mmHg  Ao V2 mean: 118.4 cm/sec  Ao mean P.4 mmHg  Ao V2 VTI: 40.4 cm  CODY(I,D): 2.5 cm2  CODY(V,D): 2.4 cm2  LV V1 max P.8 mmHg  LV V1 max: 130.6 cm/sec  LV V1 VTI: 31.2 cm  SV(LVOT): 99.4 ml  SI(LVOT): 57.3 ml/m2  TR max tru: 248.4 cm/sec  TR max P.7 mmHg  AV Tru Ratio (DI): 0.76  CODY Index (cm2/m2): 1.4  E/E' av.3  Lateral E/e': 7.3  Medial E/e': 11.3     ______________________________________________________________________________  Report approved by: Jennifer Loyola 2021 01:20 PM               Discharge Medications   Current Discharge Medication List      START taking these medications    Details   albuterol (PROAIR HFA/PROVENTIL HFA/VENTOLIN HFA) 108 (90 Base) MCG/ACT inhaler Inhale 2 puffs into the lungs every 4 hours as needed for shortness of breath / dyspnea or wheezing  Qty: 18 g, Refills: 0    Comments: Pharmacy may dispense brand covered by insurance (Proair, or proventil or ventolin or generic albuterol  inhaler)  Associated Diagnoses: Hospital-acquired pneumonia      cefdinir (OMNICEF) 300 MG capsule Take 1 capsule (300 mg) by mouth daily for 5 days  Qty: 5 capsule, Refills: 0    Associated Diagnoses: Hospital-acquired pneumonia      doxycycline hyclate (VIBRAMYCIN) 100 MG capsule Take 1 capsule (100 mg) by mouth every 12 hours for 5 days  Qty: 10 capsule, Refills: 0    Associated Diagnoses: Hospital-acquired pneumonia         CONTINUE these medications which have NOT CHANGED    Details   acetaminophen (TYLENOL) 325 MG tablet Take 650 mg by mouth 3 times daily      amLODIPine (NORVASC) 5 MG tablet Take 5 mg by mouth daily      calcium carbonate (OS-VI) 1500 (600 Ca) MG tablet Take 600 mg by mouth 2 times daily (with meals)      clopidogrel (PLAVIX) 75 MG tablet Take 75 mg by mouth daily      famotidine (PEPCID) 20 MG tablet Take 20 mg by mouth 2 times daily      Ferrous Sulfate 324 (65 Fe) MG TBEC Take 324 mg by mouth daily      insulin aspart (NOVOLOG PEN) 100 UNIT/ML pen Give 8 units subcutaneous three times daily with meals. Hold for BS <90.     Sliding scale: 151-200 2 units  201--250: 4 units  251-300: 6 units  301-350: 8 units      insulin glargine (LANTUS PEN) 100 UNIT/ML pen Inject 18 Units Subcutaneous At Bedtime      lactulose (CHRONULAC) 10 GM/15ML solution Take 15 mLs by mouth daily      levETIRAcetam (KEPPRA) 500 MG tablet Take 500 mg by mouth 2 times daily      metoprolol tartrate (LOPRESSOR) 25 MG tablet Take 25 mg by mouth 2 times daily      multivitamin w/minerals (THERA-VIT-M) tablet Take 1 tablet by mouth daily      rosuvastatin (CRESTOR) 20 MG tablet Take 20 mg by mouth daily      torsemide (DEMADEX) 20 MG tablet Take 20 mg by mouth daily      traZODone (DESYREL) 50 MG tablet Take 50 mg by mouth nightly as needed for sleep         STOP taking these medications       methocarbamol (ROBAXIN) 500 MG tablet Comments:   Reason for Stopping:             Allergies   Allergies   Allergen Reactions      Mirtazapine      Noted on [facility]  10/06/2021

## 2021-12-14 NOTE — DISCHARGE INSTRUCTIONS
Home Care Services have been arranged for the patient.  Home Care Agency:  [facility]  Home care Agency telephone:  [phone]  Services: skilled nursing, physical therapy, occupational therapy  Instructions:  Home Care will visit within 48 hours after discharge.  Provider will call you to schedule visit.

## 2021-12-14 NOTE — PROGRESS NOTES
"[facility]  Neurology Consultation - Progress Note    Patient Name:  Leon Sweeney  Date of Service:  December 14, 2021    Subjective:    No acute events overnight.  Patient hoping to leave hospital.  States continuing to sleep poorly.  There were intermittent left temporal sharps on EEG yesterday but no organized seizure activity    Objective:    Vitals: /62 (BP Location: Left arm)   Pulse 58   Temp 97.7  F (36.5  C) (Oral)   Resp 24   Ht 1.626 m (5' 4.02\")   Wt 68.5 kg (151 lb)   SpO2 96%   BMI 25.91 kg/m    General: Sitting in chair, NAD  Head: Atraumatic, normocephalic   Cardiac: no lower extremity edema  Neurologic:  Awake, alert, oriented to person place and time.  Able to tell me month and year.  Able to name simple objects.  No dysarthria.  Speech fluent.  Able to tell me details of the past 24 hours.  Moving all extremities equal and antigravity.  No pronator drift.  No leg drift.  Sensation intact light touch.    Pertinent Investigations:    I have personally reviewed most recent and pertinent labs, tests, and radiological images.     Assessment  #Toxic metabolic encephalopathy    Ms. Palmer is a 71-year-old female with very complex past medical history who neurology was consulted for encephalopathy.  My neurologic exam is significantly better than her admission exam.  It appears her mentation is improved near baseline.  She does not have any focal deficits on exam.  EEG did show left temporal spikes.  Keppra should of course be continued in the setting, but my suspicion for seizures is low and so would not increase dosing.  She will need follow-up in neurology for Keppra dosing in the setting of her renal disease.  At this point suspicion for an acute neurologic source of her transient symptoms is very low and neurology will sign off.    Recommendations:   -Continue Keppra 500 mg twice daily  -Neurology follow-up on discharge for Keppra " management.  -Inpatient neurology will sign off at this time    Thank you for involving Neurology in the care of Leon CLEVE Sweeney.    Reed Mcdonough MD

## 2021-12-14 NOTE — PLAN OF CARE
"Care Management Discharge Note    Discharge Date: 12/14/2021  Expected Time of Departure: 5:00 PM    Discharge Disposition: Home Care,Home    Discharge Services:  skilled nursing, physical therapy, home health aide    Discharge DME: None    Discharge Transportation: family or friend will provide- daughter (Audrey)    Private pay costs discussed: Not applicable      Education Provided on the Discharge Plan:  Yes, per care team  Persons Notified of Discharge Plans: patient, daughter  Patient/Family in Agreement with the Plan: yes    Handoff Referral Completed: Yes    Additional Information:  Chart reviewed and updates with care team done.  Patient is medically ready for discharge.  Patient will continue with [facility].  She was open to physical therapy and home health aide.  Nursing will be added at discharge.  Services confirmed with Niki (home care intake).   Daughter, Audrey, plans to transport home.  CM remains available should any other needs arise.    1:00 PM -  Received call from patient's daughter, Audrey, with request to delay patient discharge or send her to a TCU.  She reports that she is \"busy with working and has not cleaned up the mess from when patient was take to the hospital yet\".  Writer explained that patient is medically ready for discharge.  Patient is now on oral antibiotics and is ambulating in the acevedo.  Therapy recommendations are for home with home physical therapy.  [facility] Provider has placed discharge orders with home care services for RN, PT, HHA.  Writer informed daughter that patient would not meet criteria for the need of skilled nursing facility (SNF) so insurance ([employer]) would not likely give authorization for SNF.  Writer suggested daughter get support from other family members or could hire additional private pay services.  Audrey states she will try to get some help.    3:10 PM - Received call from patient\"s other daughterArnav \"Soki\", requesting update on patient " status.  Informed Franc of plan for discharge, answered her questions, and recommended she follow up with her sister, Audrey.  Franc states that she does not get along with Audrey very well and will leave things as they are.  She does report that she has had discussions with her mother about moving to assisted living but that her mother has not agreed to anything yet.    Tia Leal RN

## 2021-12-14 NOTE — PROGRESS NOTES
Physical Therapy        12/14/21 1400   Signing Clinician's Name / Credentials   Signing clinician's name / credentials Niki Park DPT   Quick Adds   Rehab Discipline PT   Gait Training   Minutes of Treatment (Gait Training) 20   Symptoms Noted During/After Treatment (Gait Training) none   Distance in Feet (Required for LE Total Joints) 500   Treatment Detail steady pace, no LOB or deviation   [place] Level (Gait Training) stand-by assist   Physical Assistance Level (Gait Training) supervision   Assistive Device (Gait Training) rolling walker   Stair, Performance   Minutes of Treatment 10   Symptoms Noted During/After Treatment fatigue   Treatment Detail 11 steps, cues for nonreciprocal technique using rail with both hands   Stair Railings present on left side   Physical Assist/Nonphysical Assist supervision   Level of [place] stand-by assist   PT Discharge Planning    PT Discharge Recommendation (DC Rec) home with home care physical therapy   PT Rationale for DC Rec may need daily checks from family in addition to home cares. No compelling reason in terms of mobility that pt would need to d/c to daughter's house.       Niki Park DPT 12/14/2021

## 2021-12-15 ENCOUNTER — PATIENT OUTREACH (OUTPATIENT)
Dept: CARE COORDINATION | Facility: CLINIC | Age: 71
End: 2021-12-15
Payer: COMMERCIAL

## 2021-12-15 DIAGNOSIS — Z71.89 OTHER SPECIFIED COUNSELING: ICD-10-CM

## 2021-12-15 LAB
ALBUMIN PERCENT: 58.4 % (ref 51–67)
ALBUMIN SERPL ELPH-MCNC: 3.2 G/DL (ref 3.2–4.7)
ALPHA 1 PERCENT: 4.2 % (ref 2–4)
ALPHA 2 PERCENT: 9.9 % (ref 5–13)
ALPHA1 GLOB SERPL ELPH-MCNC: 0.2 G/DL (ref 0.1–0.3)
ALPHA2 GLOB SERPL ELPH-MCNC: 0.5 G/DL (ref 0.4–0.9)
B-GLOBULIN SERPL ELPH-MCNC: 0.5 G/DL (ref 0.7–1.2)
BETA PERCENT: 9 % (ref 10–17)
GAMMA GLOB SERPL ELPH-MCNC: 1 G/DL (ref 0.6–1.4)
GAMMA GLOBULIN PERCENT: 18.5 % (ref 9–20)
PATH ICD:: ABNORMAL
PROT PATTERN SERPL ELPH-IMP: ABNORMAL
REVIEWING PATHOLOGIST: ABNORMAL
TOTAL PROTEIN SERUM FOR ELP (SYNCED VALUE): 5.5 G/DL

## 2021-12-15 PROCEDURE — 84165 PROTEIN E-PHORESIS SERUM: CPT | Mod: 26 | Performed by: PATHOLOGY

## 2021-12-15 NOTE — PROGRESS NOTES
Clinic Care Coordination Contact  [facility]/Voicemail       Clinical Data: Care Coordinator Outreach  Reason for referral: TCM outreach  Outreach attempted x 1.   not able to leave message on patient's voicemail with call back information or request return call.   Plan:  Care Coordinator will try to reach patient again in 1-2 business days.       Awa Stapleton  Community Health Worker  [facility]  Ph: [phone]

## 2021-12-15 NOTE — PLAN OF CARE
Discharge instructions and After Summary Visit discussed with patient and patient's daughter Audrey; both verbalized understanding of teaching and instructions provided. Discharge medications handed to patient and patient's family. Patient brought with her all her personal belongings upon discharge.     Problem: Adult Inpatient Plan of Care  Goal: Plan of Care Review  Outcome: Adequate for Discharge  Goal: Patient-Specific Goal (Individualized)  Outcome: Adequate for Discharge  Goal: Absence of Hospital-Acquired Illness or Injury  Outcome: Adequate for Discharge  Intervention: Prevent Skin Injury  Recent Flowsheet Documentation  Taken 12/14/2021 8055 by Alba Umana RN  Body Position: position changed independently  Goal: Optimal Comfort and Wellbeing  Outcome: Adequate for Discharge  Goal: Readiness for Transition of Care  Outcome: Adequate for Discharge     Problem: Risk for Delirium  Goal: Optimal Coping  Outcome: Adequate for Discharge  Goal: Improved Behavioral Control  Outcome: Adequate for Discharge  Goal: Improved Attention and Thought Clarity  Outcome: Adequate for Discharge  Goal: Improved Sleep  Outcome: Adequate for Discharge     Problem: Cognitive Impairment (Psychotic Signs/Symptoms)  Goal: Optimal Cognitive Function (Psychotic Signs/Symptoms)  Outcome: Adequate for Discharge     Problem: Infection (Pneumonia)  Goal: Resolution of Infection Signs and Symptoms  Outcome: Adequate for Discharge     Problem: Respiratory Compromise (Pneumonia)  Goal: Effective Oxygenation and Ventilation  Outcome: Adequate for Discharge     Problem: Discharge Planning  Goal: Discharge Planning (Adult, OB, Behavioral, Peds)  Outcome: Adequate for Discharge

## 2021-12-16 LAB — BACTERIA BLD CULT: NO GROWTH

## 2021-12-16 NOTE — PROGRESS NOTES
Clinic Care Coordination Contact  [facility]/Voicemail       Clinical Data: Care Coordinator Outreach  Reason for referral: TCM outreach  Outreach attempted x 2.   left message on patient's voicemail with call back information and requested return call. Provided main line for patients to reach an : [phone]  Plan:  Care Coordinator will do no further outreaches at this time.       Awa Stapleton  Community Health Worker  [facility]  Ph: [phone]

## 2021-12-17 LAB — BACTERIA BLD CULT: NO GROWTH

## 2022-01-02 ENCOUNTER — HOSPITAL ENCOUNTER (INPATIENT)
Facility: HOSPITAL | Age: 72
LOS: 3 days | Discharge: HOME HEALTH | DRG: 071 | End: 2022-01-06
Attending: EMERGENCY MEDICINE | Admitting: HOSPITALIST
Payer: COMMERCIAL

## 2022-01-02 DIAGNOSIS — G93.40 ACUTE ENCEPHALOPATHY: ICD-10-CM

## 2022-01-02 DIAGNOSIS — N39.0 URINARY TRACT INFECTION WITHOUT HEMATURIA, SITE UNSPECIFIED: ICD-10-CM

## 2022-01-02 DIAGNOSIS — K21.00 GASTROESOPHAGEAL REFLUX DISEASE WITH ESOPHAGITIS, UNSPECIFIED WHETHER HEMORRHAGE: Primary | ICD-10-CM

## 2022-01-02 PROCEDURE — 99285 EMERGENCY DEPT VISIT HI MDM: CPT | Mod: 25

## 2022-01-02 PROCEDURE — C9803 HOPD COVID-19 SPEC COLLECT: HCPCS

## 2022-01-02 ASSESSMENT — MIFFLIN-ST. JEOR: SCORE: 1164.06

## 2022-01-03 ENCOUNTER — APPOINTMENT (OUTPATIENT)
Dept: CT IMAGING | Facility: HOSPITAL | Age: 72
DRG: 071 | End: 2022-01-03
Attending: EMERGENCY MEDICINE
Payer: COMMERCIAL

## 2022-01-03 ENCOUNTER — APPOINTMENT (OUTPATIENT)
Dept: RADIOLOGY | Facility: HOSPITAL | Age: 72
DRG: 071 | End: 2022-01-03
Attending: EMERGENCY MEDICINE
Payer: COMMERCIAL

## 2022-01-03 PROBLEM — N39.0 URINARY TRACT INFECTION WITHOUT HEMATURIA, SITE UNSPECIFIED: Status: ACTIVE | Noted: 2022-01-03

## 2022-01-03 PROBLEM — G93.40 ACUTE ENCEPHALOPATHY: Status: ACTIVE | Noted: 2022-01-03

## 2022-01-03 LAB
ALBUMIN SERPL-MCNC: 2.8 G/DL (ref 3.5–5)
ALBUMIN UR-MCNC: 50 MG/DL
ALP SERPL-CCNC: 324 U/L (ref 45–120)
ALT SERPL W P-5'-P-CCNC: 29 U/L (ref 0–45)
AMMONIA PLAS-SCNC: 43 UMOL/L (ref 11–35)
ANION GAP SERPL CALCULATED.3IONS-SCNC: 12 MMOL/L (ref 5–18)
APPEARANCE UR: CLEAR
AST SERPL W P-5'-P-CCNC: 77 U/L (ref 0–40)
BACTERIA #/AREA URNS HPF: ABNORMAL /HPF
BASOPHILS # BLD AUTO: 0 10E3/UL (ref 0–0.2)
BASOPHILS NFR BLD AUTO: 0 %
BILIRUB SERPL-MCNC: 1.7 MG/DL (ref 0–1)
BILIRUB UR QL STRIP: NEGATIVE
BUN SERPL-MCNC: 26 MG/DL (ref 8–28)
C REACTIVE PROTEIN LHE: 1.3 MG/DL (ref 0–0.8)
CALCIUM SERPL-MCNC: 10.2 MG/DL (ref 8.5–10.5)
CHLORIDE BLD-SCNC: 107 MMOL/L (ref 98–107)
CO2 SERPL-SCNC: 23 MMOL/L (ref 22–31)
COLOR UR AUTO: ABNORMAL
CREAT SERPL-MCNC: 2.17 MG/DL (ref 0.6–1.1)
EOSINOPHIL # BLD AUTO: 0.1 10E3/UL (ref 0–0.7)
EOSINOPHIL NFR BLD AUTO: 3 %
ERYTHROCYTE [DISTWIDTH] IN BLOOD BY AUTOMATED COUNT: 15.5 % (ref 10–15)
GFR SERPL CREATININE-BSD FRML MDRD: 24 ML/MIN/1.73M2
GLUCOSE BLD-MCNC: 104 MG/DL (ref 70–125)
GLUCOSE BLDC GLUCOMTR-MCNC: 119 MG/DL (ref 70–99)
GLUCOSE BLDC GLUCOMTR-MCNC: 197 MG/DL (ref 70–99)
GLUCOSE BLDC GLUCOMTR-MCNC: 229 MG/DL (ref 70–99)
GLUCOSE BLDC GLUCOMTR-MCNC: 80 MG/DL (ref 70–99)
GLUCOSE UR STRIP-MCNC: NEGATIVE MG/DL
HCT VFR BLD AUTO: 29.6 % (ref 35–47)
HGB BLD-MCNC: 8.9 G/DL (ref 11.7–15.7)
HGB UR QL STRIP: ABNORMAL
HYALINE CASTS: 1 /LPF
IMM GRANULOCYTES # BLD: 0 10E3/UL
IMM GRANULOCYTES NFR BLD: 0 %
KETONES UR STRIP-MCNC: NEGATIVE MG/DL
LEUKOCYTE ESTERASE UR QL STRIP: ABNORMAL
LEVETIRACETAM (KEPPRA): 74 UG/ML (ref 6–46)
LYMPHOCYTES # BLD AUTO: 0.6 10E3/UL (ref 0.8–5.3)
LYMPHOCYTES NFR BLD AUTO: 17 %
MCH RBC QN AUTO: 23.4 PG (ref 26.5–33)
MCHC RBC AUTO-ENTMCNC: 30.1 G/DL (ref 31.5–36.5)
MCV RBC AUTO: 78 FL (ref 78–100)
MONOCYTES # BLD AUTO: 0.3 10E3/UL (ref 0–1.3)
MONOCYTES NFR BLD AUTO: 8 %
MUCOUS THREADS #/AREA URNS LPF: PRESENT /LPF
NEUTROPHILS # BLD AUTO: 2.4 10E3/UL (ref 1.6–8.3)
NEUTROPHILS NFR BLD AUTO: 72 %
NITRATE UR QL: POSITIVE
NRBC # BLD AUTO: 0 10E3/UL
NRBC BLD AUTO-RTO: 0 /100
PH UR STRIP: 7 [PH] (ref 5–7)
PLATELET # BLD AUTO: 82 10E3/UL (ref 150–450)
POTASSIUM BLD-SCNC: 5 MMOL/L (ref 3.5–5)
PROCALCITONIN SERPL-MCNC: 0.13 NG/ML (ref 0–0.49)
PROT SERPL-MCNC: 6.8 G/DL (ref 6–8)
RBC # BLD AUTO: 3.81 10E6/UL (ref 3.8–5.2)
RBC URINE: 4 /HPF
SARS-COV-2 RNA RESP QL NAA+PROBE: NEGATIVE
SODIUM SERPL-SCNC: 142 MMOL/L (ref 136–145)
SP GR UR STRIP: 1.01 (ref 1–1.03)
SQUAMOUS EPITHELIAL: 1 /HPF
TRANSITIONAL EPI: <1 /HPF
TSH SERPL DL<=0.005 MIU/L-ACNC: 2.85 UIU/ML (ref 0.3–5)
UROBILINOGEN UR STRIP-MCNC: <2 MG/DL
WBC # BLD AUTO: 3.3 10E3/UL (ref 4–11)
WBC URINE: 18 /HPF

## 2022-01-03 PROCEDURE — 250N000011 HC RX IP 250 OP 636: Performed by: EMERGENCY MEDICINE

## 2022-01-03 PROCEDURE — 93005 ELECTROCARDIOGRAM TRACING: CPT | Performed by: EMERGENCY MEDICINE

## 2022-01-03 PROCEDURE — 70450 CT HEAD/BRAIN W/O DYE: CPT

## 2022-01-03 PROCEDURE — 96366 THER/PROPH/DIAG IV INF ADDON: CPT

## 2022-01-03 PROCEDURE — 85025 COMPLETE CBC W/AUTO DIFF WBC: CPT | Performed by: EMERGENCY MEDICINE

## 2022-01-03 PROCEDURE — 99223 1ST HOSP IP/OBS HIGH 75: CPT | Performed by: PSYCHIATRY & NEUROLOGY

## 2022-01-03 PROCEDURE — 96361 HYDRATE IV INFUSION ADD-ON: CPT

## 2022-01-03 PROCEDURE — 87086 URINE CULTURE/COLONY COUNT: CPT | Performed by: EMERGENCY MEDICINE

## 2022-01-03 PROCEDURE — 99223 1ST HOSP IP/OBS HIGH 75: CPT | Mod: AI | Performed by: HOSPITALIST

## 2022-01-03 PROCEDURE — 81003 URINALYSIS AUTO W/O SCOPE: CPT | Performed by: EMERGENCY MEDICINE

## 2022-01-03 PROCEDURE — 82140 ASSAY OF AMMONIA: CPT | Performed by: EMERGENCY MEDICINE

## 2022-01-03 PROCEDURE — 84145 PROCALCITONIN (PCT): CPT | Performed by: EMERGENCY MEDICINE

## 2022-01-03 PROCEDURE — 250N000013 HC RX MED GY IP 250 OP 250 PS 637: Performed by: HOSPITALIST

## 2022-01-03 PROCEDURE — 80177 DRUG SCRN QUAN LEVETIRACETAM: CPT | Performed by: EMERGENCY MEDICINE

## 2022-01-03 PROCEDURE — 87635 SARS-COV-2 COVID-19 AMP PRB: CPT | Performed by: EMERGENCY MEDICINE

## 2022-01-03 PROCEDURE — 120N000001 HC R&B MED SURG/OB

## 2022-01-03 PROCEDURE — 250N000012 HC RX MED GY IP 250 OP 636 PS 637: Performed by: HOSPITALIST

## 2022-01-03 PROCEDURE — 36415 COLL VENOUS BLD VENIPUNCTURE: CPT | Performed by: EMERGENCY MEDICINE

## 2022-01-03 PROCEDURE — 84443 ASSAY THYROID STIM HORMONE: CPT | Performed by: EMERGENCY MEDICINE

## 2022-01-03 PROCEDURE — 80053 COMPREHEN METABOLIC PANEL: CPT | Performed by: EMERGENCY MEDICINE

## 2022-01-03 PROCEDURE — 86141 C-REACTIVE PROTEIN HS: CPT | Performed by: EMERGENCY MEDICINE

## 2022-01-03 PROCEDURE — 258N000003 HC RX IP 258 OP 636: Performed by: EMERGENCY MEDICINE

## 2022-01-03 PROCEDURE — 96365 THER/PROPH/DIAG IV INF INIT: CPT

## 2022-01-03 PROCEDURE — 71045 X-RAY EXAM CHEST 1 VIEW: CPT

## 2022-01-03 RX ORDER — PROCHLORPERAZINE 25 MG
12.5 SUPPOSITORY, RECTAL RECTAL EVERY 12 HOURS PRN
Status: DISCONTINUED | OUTPATIENT
Start: 2022-01-03 | End: 2022-01-06 | Stop reason: HOSPADM

## 2022-01-03 RX ORDER — NICOTINE POLACRILEX 4 MG
15-30 LOZENGE BUCCAL
Status: DISCONTINUED | OUTPATIENT
Start: 2022-01-03 | End: 2022-01-06 | Stop reason: HOSPADM

## 2022-01-03 RX ORDER — LIDOCAINE 40 MG/G
CREAM TOPICAL
Status: DISCONTINUED | OUTPATIENT
Start: 2022-01-03 | End: 2022-01-06 | Stop reason: HOSPADM

## 2022-01-03 RX ORDER — ACETAMINOPHEN 325 MG/1
650 TABLET ORAL EVERY 6 HOURS PRN
Status: DISCONTINUED | OUTPATIENT
Start: 2022-01-03 | End: 2022-01-06 | Stop reason: HOSPADM

## 2022-01-03 RX ORDER — PROCHLORPERAZINE MALEATE 5 MG
5 TABLET ORAL EVERY 6 HOURS PRN
Status: DISCONTINUED | OUTPATIENT
Start: 2022-01-03 | End: 2022-01-06 | Stop reason: HOSPADM

## 2022-01-03 RX ORDER — LEVETIRACETAM 500 MG/1
500 TABLET ORAL 2 TIMES DAILY
Status: DISCONTINUED | OUTPATIENT
Start: 2022-01-03 | End: 2022-01-06 | Stop reason: HOSPADM

## 2022-01-03 RX ORDER — CEFTRIAXONE 1 G/1
1 INJECTION, POWDER, FOR SOLUTION INTRAMUSCULAR; INTRAVENOUS EVERY 24 HOURS
Status: COMPLETED | OUTPATIENT
Start: 2022-01-04 | End: 2022-01-06

## 2022-01-03 RX ORDER — FAMOTIDINE 20 MG/1
20 TABLET, FILM COATED ORAL 2 TIMES DAILY
Status: DISCONTINUED | OUTPATIENT
Start: 2022-01-03 | End: 2022-01-04

## 2022-01-03 RX ORDER — CLOPIDOGREL BISULFATE 75 MG/1
75 TABLET ORAL DAILY
Status: DISCONTINUED | OUTPATIENT
Start: 2022-01-03 | End: 2022-01-06 | Stop reason: HOSPADM

## 2022-01-03 RX ORDER — FERROUS SULFATE 325(65) MG
325 TABLET, DELAYED RELEASE (ENTERIC COATED) ORAL DAILY
COMMUNITY
Start: 2021-09-16 | End: 2022-09-16

## 2022-01-03 RX ORDER — ALBUTEROL SULFATE 90 UG/1
2 AEROSOL, METERED RESPIRATORY (INHALATION) EVERY 4 HOURS PRN
Status: DISCONTINUED | OUTPATIENT
Start: 2022-01-03 | End: 2022-01-06 | Stop reason: HOSPADM

## 2022-01-03 RX ORDER — AMLODIPINE BESYLATE 5 MG/1
5 TABLET ORAL DAILY
Status: DISCONTINUED | OUTPATIENT
Start: 2022-01-03 | End: 2022-01-06 | Stop reason: HOSPADM

## 2022-01-03 RX ORDER — ROSUVASTATIN CALCIUM 10 MG/1
20 TABLET, COATED ORAL DAILY
Status: DISCONTINUED | OUTPATIENT
Start: 2022-01-03 | End: 2022-01-06 | Stop reason: HOSPADM

## 2022-01-03 RX ORDER — METOPROLOL TARTRATE 25 MG/1
25 TABLET, FILM COATED ORAL 2 TIMES DAILY
Status: DISCONTINUED | OUTPATIENT
Start: 2022-01-03 | End: 2022-01-06 | Stop reason: HOSPADM

## 2022-01-03 RX ORDER — LACTULOSE 10 G/15ML
15 SOLUTION ORAL 2 TIMES DAILY
Status: DISCONTINUED | OUTPATIENT
Start: 2022-01-03 | End: 2022-01-06 | Stop reason: HOSPADM

## 2022-01-03 RX ORDER — SPIRONOLACTONE 50 MG/1
1 TABLET, FILM COATED ORAL
COMMUNITY
Start: 2020-12-28

## 2022-01-03 RX ORDER — ACETAMINOPHEN 650 MG/1
650 SUPPOSITORY RECTAL EVERY 6 HOURS PRN
Status: DISCONTINUED | OUTPATIENT
Start: 2022-01-03 | End: 2022-01-06 | Stop reason: HOSPADM

## 2022-01-03 RX ORDER — CEFTRIAXONE 1 G/1
1 INJECTION, POWDER, FOR SOLUTION INTRAMUSCULAR; INTRAVENOUS ONCE
Status: COMPLETED | OUTPATIENT
Start: 2022-01-03 | End: 2022-01-03

## 2022-01-03 RX ORDER — DEXTROSE MONOHYDRATE 25 G/50ML
25-50 INJECTION, SOLUTION INTRAVENOUS
Status: DISCONTINUED | OUTPATIENT
Start: 2022-01-03 | End: 2022-01-06 | Stop reason: HOSPADM

## 2022-01-03 RX ORDER — LACTULOSE 10 G/15ML
15 SOLUTION ORAL DAILY
Status: DISCONTINUED | OUTPATIENT
Start: 2022-01-03 | End: 2022-01-03

## 2022-01-03 RX ADMIN — CEFTRIAXONE SODIUM 1 G: 1 INJECTION, POWDER, FOR SOLUTION INTRAMUSCULAR; INTRAVENOUS at 01:47

## 2022-01-03 RX ADMIN — LEVETIRACETAM 500 MG: 500 TABLET, FILM COATED ORAL at 18:03

## 2022-01-03 RX ADMIN — METOPROLOL TARTRATE 25 MG: 25 TABLET, FILM COATED ORAL at 21:08

## 2022-01-03 RX ADMIN — LACTULOSE 15 ML: 10 SOLUTION ORAL at 11:05

## 2022-01-03 RX ADMIN — INSULIN ASPART 1 UNITS: 100 INJECTION, SOLUTION INTRAVENOUS; SUBCUTANEOUS at 22:46

## 2022-01-03 RX ADMIN — ACETAMINOPHEN 650 MG: 325 TABLET ORAL at 18:30

## 2022-01-03 RX ADMIN — FAMOTIDINE 20 MG: 20 TABLET, FILM COATED ORAL at 21:08

## 2022-01-03 RX ADMIN — CLOPIDOGREL BISULFATE 75 MG: 75 TABLET ORAL at 18:02

## 2022-01-03 RX ADMIN — INSULIN ASPART 2 UNITS: 100 INJECTION, SOLUTION INTRAVENOUS; SUBCUTANEOUS at 18:01

## 2022-01-03 RX ADMIN — SODIUM CHLORIDE 1000 ML: 9 INJECTION, SOLUTION INTRAVENOUS at 00:57

## 2022-01-03 RX ADMIN — AMLODIPINE BESYLATE 5 MG: 5 TABLET ORAL at 19:46

## 2022-01-03 RX ADMIN — LACTULOSE 15 ML: 10 SOLUTION ORAL at 21:55

## 2022-01-03 RX ADMIN — ROSUVASTATIN CALCIUM 20 MG: 10 TABLET, FILM COATED ORAL at 19:48

## 2022-01-03 ASSESSMENT — ACTIVITIES OF DAILY LIVING (ADL)
ADLS_ACUITY_SCORE: 6
ADLS_ACUITY_SCORE: 8
ADLS_ACUITY_SCORE: 6
ADLS_ACUITY_SCORE: 8
ADLS_ACUITY_SCORE: 6
ADLS_ACUITY_SCORE: 4
ADLS_ACUITY_SCORE: 6
ADLS_ACUITY_SCORE: 6
ADLS_ACUITY_SCORE: 8
ADLS_ACUITY_SCORE: 6
ADLS_ACUITY_SCORE: 4
ADLS_ACUITY_SCORE: 6
ADLS_ACUITY_SCORE: 6
ADLS_ACUITY_SCORE: 4
ADLS_ACUITY_SCORE: 6
DEPENDENT_IADLS:: CLEANING;COOKING;LAUNDRY;SHOPPING;MEAL PREPARATION;MEDICATION MANAGEMENT;MONEY MANAGEMENT;TRANSPORTATION
ADLS_ACUITY_SCORE: 6

## 2022-01-03 NOTE — H&P
[facility]    History and Physical - Hospitalist Service       Date of Admission:  1/2/2022    Assessment & Plan      Leon Sweeney is a 71 year old female admitted on 1/2/2022. She was brought to the emergency department for evaluation of altered mental status, concern for UTI.    1.  Acute metabolic encephalopathy  CT head without evidence for acute intracranial process  Likely in the setting of UTI  Ammonia 43, daughter reports not taking lactulose in a while  Supportive management    2.  Acute cystitis without hematuria  Continue IV ceftriaxone  Follow-up urine culture    3.  Acute kidney injury on chronic kidney disease stage IV  Received 1 L normal saline bolus in the ED, hold off further IV fluids  Hold diuretics for now  Avoid nephrotoxins    4.  Cirrhosis, chronic hepatitis C  No signs of ascites but chronic edema with stasis dermatitis  Holding diuretics secondary to MARIA DE JESUS, monitor volume status  Resume lactulose    5.  Pancytopenia  Secondary to liver disease likely  Stable blood counts    6.  Abnormal chest x-ray  Platelike infiltrate in the right base  Recently treated for pneumonia  SARS-CoV-2 PCR negative  Occasional cough per daughter  Procalcitonin not elevated    7.  Seizures disorder  Follow-up Keppra level     Diet: Combination Diet Moderate Consistent Carb (60 g CHO per Meal) Diet; Low Saturated Fat Na <2400mg Diet    DVT Prophylaxis: Pneumatic Compression Devices, pharmacologic prophylaxis contraindicated secondary to probable coagulopathy and thrombocytopenia from liver disease   Guerra Catheter: Not present  Central Lines: None  Code Status: Full Code      Clinically Significant Risk Factors Present on Admission              # Platelet Defect: home medication list includes an antiplatelet medication   # Overweight: last Body mass index is 26.75 kg/m .      Disposition Plan   Expected Discharge:  at least 2 midnights  Anticipated discharge location:  Awaiting care  coordination huddle  Delays:    Encephalopathy       The patient's care was discussed with the Patient's Family.    Luis Lincoln MD  [facility]  Securely message with the [employer] Web Console (learn more here)  Text page via Ring Paging/Directory        ______________________________________________________________________    Chief Complaint   Confusion    History is obtained from the electronic health record, emergency department physician and patient's daughter    History of Present Illness   Leon Sweeney is a 71 year old female who was brought to the emergency department by ambulance for evaluation of confusion.  The information was obtained from her daughter Audrey Sweeney [phone].  Past medical history of chronic hepatitis C, cirrhosis, pancytopenia, chronic kidney disease stage IV, essential hypertension, type 2 diabetes, GERD, seizure disorder.  She was admitted to this hospital from the 711 through December 14, 2021 and treated for pneumonia, altered mental status, acute on chronic kidney disease.  As per daughter, patient has not had issues with bowel movements and so has not taken lactulose in a while.  Her daughter noted that she was quiet and irritable answering with one-word.  She suspected a UTI, however patient did not have any specific complaints.  In the ED, she was afebrile and hemodynamically stable.    Review of Systems    Review of systems not obtained due to patient factors - mental status    Past Medical History    I have reviewed this patient's medical history and updated it with pertinent information if needed.   Past Medical History:   Diagnosis Date     Acute kidney failure (H)      Anxiety      Cerebral infarction (H)      Chronic kidney disease     stage III; with anemia     Diabetes (H)     Type II with neuropathy     Encephalopathy      Gastroesophageal reflux disease      Heart failure (H)     unspecified     Hydronephrosis      Hyperkalemia       Hypertension      Insomnia      Malnutrition (H)      Pleural effusion      Portal hypertension (H)      Seizures (H)      Transient cerebral ischemic attack        Past Surgical History   I have reviewed this patient's surgical history and updated it with pertinent information if needed.  No past surgical history on file.    Social History   I have reviewed this patient's social history and updated it with pertinent information if needed.  Social History     Tobacco Use     Smoking status: Never Smoker     Smokeless tobacco: Not on file   Substance Use Topics     Alcohol use: Not on file     Drug use: Not on file       Family History     No significant family history, including no history of:     Prior to Admission Medications   Prior to Admission Medications   Prescriptions Last Dose Informant Patient Reported? Taking?   Ferrous Sulfate 324 (65 Fe) MG TBEC   Yes No   Sig: Take 324 mg by mouth daily   acetaminophen (TYLENOL) 325 MG tablet   Yes No   Sig: Take 650 mg by mouth 3 times daily   albuterol (PROAIR HFA/PROVENTIL HFA/VENTOLIN HFA) 108 (90 Base) MCG/ACT inhaler   No No   Sig: Inhale 2 puffs into the lungs every 4 hours as needed for shortness of breath / dyspnea or wheezing   amLODIPine (NORVASC) 5 MG tablet   Yes No   Sig: Take 5 mg by mouth daily   calcium carbonate (OS-VI) 1500 (600 Ca) MG tablet   Yes No   Sig: Take 600 mg by mouth 2 times daily (with meals)   clopidogrel (PLAVIX) 75 MG tablet   Yes No   Sig: Take 75 mg by mouth daily   famotidine (PEPCID) 20 MG tablet   Yes No   Sig: Take 20 mg by mouth 2 times daily   insulin aspart (NOVOLOG PEN) 100 UNIT/ML pen   Yes No   Sig: Give 8 units subcutaneous three times daily with meals. Hold for BS <90.     Sliding scale: 151-200 2 units  201--250: 4 units  251-300: 6 units  301-350: 8 units   insulin glargine (LANTUS PEN) 100 UNIT/ML pen   Yes No   Sig: Inject 18 Units Subcutaneous At Bedtime   lactulose (CHRONULAC) 10 GM/15ML solution   Yes No   Sig:  Take 15 mLs by mouth daily   levETIRAcetam (KEPPRA) 500 MG tablet   Yes No   Sig: Take 500 mg by mouth 2 times daily   metoprolol tartrate (LOPRESSOR) 25 MG tablet   Yes No   Sig: Take 25 mg by mouth 2 times daily   multivitamin w/minerals (THERA-VIT-M) tablet   Yes No   Sig: Take 1 tablet by mouth daily   rosuvastatin (CRESTOR) 20 MG tablet   Yes No   Sig: Take 20 mg by mouth daily   torsemide (DEMADEX) 20 MG tablet   Yes No   Sig: Take 20 mg by mouth daily   traZODone (DESYREL) 50 MG tablet   Yes No   Sig: Take 50 mg by mouth nightly as needed for sleep      Facility-Administered Medications: None     Allergies   Allergies   Allergen Reactions     Mirtazapine      Noted on [facility]  10/06/2021       Physical Exam   Vital Signs: Temp: 98.4  F (36.9  C) Temp src: Oral BP: (!) 149/69 Pulse: 68   Resp: 16 SpO2: 100 %      Weight: 151 lbs 0 oz    Constitutional: awake, distracted and no apparent distress  Eyes: sclera clear  ENT: normocepalic, without obvious abnormality, atramatic  Hematologic / Lymphatic: no cervical lymphadenopathy and no supraclavicular lymphadenopathy  Respiratory: no increased work of breathing, good air exchange, no retractions and occasional rhonchi anteriorly  Cardiovascular: regular rate and rhythm and normal S1 and S2  GI: normal bowel sounds, soft, non-distended and non-tender  Skin: Stasis dermatitis  Musculoskeletal: Chronic edema  Neurologic: Mental Status Exam:  Level of Alertness:   awake but not interacting  Motor Exam: Moves all extremities spontaneously    Data   Data reviewed today: I reviewed all medications, new labs and imaging results over the last 24 hours. I personally reviewed no images or EKG's today.    Recent Labs   Lab 01/03/22  0017   WBC 3.3*   HGB 8.9*   MCV 78   PLT 82*      POTASSIUM 5.0   CHLORIDE 107   CO2 23   BUN 26   CR 2.17*   ANIONGAP 12   VI 10.2      ALBUMIN 2.8*   PROTTOTAL 6.8   BILITOTAL 1.7*   ALKPHOS 324*   ALT  29   AST 77*     Recent Results (from the past 24 hour(s))   Head CT w/o contrast    Narrative    EXAM: CT HEAD W/O CONTRAST  LOCATION: [facility]  DATE/TIME: 1/3/2022 12:38 AM    INDICATION: Altered mental status  COMPARISON: 12/01/2021  TECHNIQUE: Routine CT Head without IV contrast. Multiplanar reformats. Dose reduction techniques were used.    FINDINGS:  INTRACRANIAL CONTENTS: No intracranial hemorrhage, extraaxial collection, or mass effect.  No CT evidence of acute infarct. Moderate presumed chronic small vessel ischemic changes. Mild to moderate generalized volume loss. No hydrocephalus.     VISUALIZED ORBITS/SINUSES/MASTOIDS: Prior bilateral cataract surgery. Visualized portions of the orbits are otherwise unremarkable. Mild mucosal thickening scattered about the paranasal sinuses. No middle ear or mastoid effusion.    BONES/SOFT TISSUES: No acute abnormality.      Impression    IMPRESSION:  1.  No CT evidence for acute intracranial process.  2.  Stable chronic changes as above.   XR Chest Port 1 View    Narrative    EXAM: XR CHEST PORT 1 VIEW  LOCATION: [facility]  DATE/TIME: 1/3/2022 12:41 AM    INDICATION: AMS, recently treated for pneumonia.  COMPARISON: None.      Impression    IMPRESSION: Cardiac enlargement. Small right pleural effusion. Platelike infiltrate in the right base.

## 2022-01-03 NOTE — PHARMACY-ADMISSION MEDICATION HISTORY
Pharmacy Note - Admission Medication History    Pertinent Provider Information:     - Prescription for torsemide 20mg is for 1/2 tablet daily but patient has been taking 1 tab daily since prescribed on 11/9/21. She will either need a new prescription for 1 tab daily or education to decrease to 1/2 tab daily per prescription.   - Daughter is unable to talk at work and helps manage medications. Was unable to confirm if taking spironolactone 50mg twice weekly but patient has been filling regularly since 3/2021 with last fill 12/15/21 and on [facility] nephrology med list.      ______________________________________________________________________    Prior To Admission (PTA) med list completed and updated in EMR.       PTA Med List   Medication Sig Note Last Dose     acetaminophen (TYLENOL) 325 MG tablet Take 650 mg by mouth 3 times daily  Past Month at Unknown time     albuterol (PROAIR HFA/PROVENTIL HFA/VENTOLIN HFA) 108 (90 Base) MCG/ACT inhaler Inhale 2 puffs into the lungs every 4 hours as needed for shortness of breath / dyspnea or wheezing  Past Month at Unknown time     amLODIPine (NORVASC) 5 MG tablet Take 5 mg by mouth daily  1/2/2022 at Unknown time     calcium carbonate (OS-VI) 1500 (600 Ca) MG tablet Take 600 mg by mouth 2 times daily (with meals)  1/2/2022 at Unknown time     clopidogrel (PLAVIX) 75 MG tablet Take 75 mg by mouth daily  1/2/2022 at Unknown time     famotidine (PEPCID) 20 MG tablet Take 20 mg by mouth 2 times daily  1/2/2022 at Unknown time     ferrous sulfate (FE TABS) 325 (65 Fe) MG EC tablet Take 325 mg by mouth daily  1/2/2022 at Unknown time     insulin aspart (NOVOLOG PEN) 100 UNIT/ML pen Give 8 units subcutaneous three times daily with meals. Hold for BS <90.     Sliding scale: 151-200 2 units  201--250: 4 units  251-300: 6 units  301-350: 8 units  1/2/2022 at Unknown time     insulin glargine (LANTUS PEN) 100 UNIT/ML pen Inject 18 Units Subcutaneous At Bedtime  1/2/2022 at Unknown time      lactulose (CHRONULAC) 10 GM/15ML solution Take 15 mLs by mouth daily 1/3/2022: Taking PRN? Last filled 1/5/21 More than a month at Unknown time     levETIRAcetam (KEPPRA) 500 MG tablet Take 500 mg by mouth 2 times daily  Past Month at Unknown time     metoprolol tartrate (LOPRESSOR) 25 MG tablet Take 25 mg by mouth 2 times daily  Past Month at Unknown time     multivitamin w/minerals (THERA-VIT-M) tablet Take 1 tablet by mouth daily  Past Month at Unknown time     rosuvastatin (CRESTOR) 20 MG tablet Take 20 mg by mouth daily  Past Month at Unknown time     spironolactone (ALDACTONE) 50 MG tablet Take 1 tablet by mouth twice a week 1/3/2022: Unable to verify with patient or daughter, but has been filled regularly since 3/2021 with last fill 12/15/21      torsemide (DEMADEX) 20 MG tablet Take 20 mg by mouth daily 1/3/2022: Previously taking 1/2 tab daily per [facility] until last admission Past Month at Unknown time     traZODone (DESYREL) 50 MG tablet Take 50 mg by mouth nightly as needed for sleep  Past Month at Unknown time       Information source(s): Patient, Patient's pharmacy and [facility]. Note, pharmacist Catalina spoke to patient until patient was confused about insulin doses.  Method of interview communication: in-person    Summary of Changes to PTA Med List  New: Spironolactone 50mg twice weekly  Discontinued: none  Changed: Ferrous sulfate from 324mg to 325mg daily    Patient was asked about OTC/herbal products specifically.  PTA med list reflects this.    Allergies were reviewed, assessed, and updated with the patient.      Patient does not anticipate needing any multi-use medications during admission.    The information provided in this note is only as accurate as the sources available at the time of the update(s).    Thank you for the opportunity to participate in the care of this patient.    Andreea Song [facility]  1/3/2022 10:41 AM

## 2022-01-03 NOTE — CONSULTS
Care Management Initial Consult    General Information  Assessment completed with: Children, deer-Audrey  Type of CM/SW Visit: Initial Assessment    Primary Care Provider verified and updated as needed: Yes   Readmission within the last 30 days: current reason for admission unrelated to previous admission         Advance Care Planning:            Communication Assessment  Patient's communication style: spoken language (English or Bilingual)    Hearing Difficulty or Deaf: no   Wear Glasses or Blind: no    Cognitive  Cognitive/Neuro/Behavioral: mood/behavior           Mood/Behavior: flat affect          Living Environment:   People in home: child(asha), adult  Audrey  Current living Arrangements: house      Able to return to prior arrangements: yes       Family/Social Support:  Care provided by: child(asha)  Provides care for: no one     Children          Description of Support System: Supportive,Involved         Current Resources:   Patient receiving home care services: Yes  Skilled Home Care Services: Skilled Nursing,Home Health Aid,Physicial Therapy  Community Resources: None  Equipment currently used at home: cane, straight  Supplies currently used at home: None    Employment/Financial:  Employment Status:          Financial Concerns:             Lifestyle & Psychosocial Needs:  Social Determinants of Health     Tobacco Use: Unknown     Smoking Tobacco Use: Never Smoker     Smokeless Tobacco Use: Unknown   Alcohol Use: Not on file   Financial Resource Strain: Not on file   Food Insecurity: Not on file   Transportation Needs: Not on file   Physical Activity: Not on file   Stress: Not on file   Social Connections: Not on file   Intimate Partner Violence: Not on file   Depression: Not on file   Housing Stability: Not on file       Functional Status:  Prior to admission patient needed assistance:   Dependent ADLs::  (intermittent assist)  Dependent IADLs:: Cleaning,Cooking,Laundry,Shopping,Meal Preparation,Medication  Management,Money Management,Transportation                Additional Information:  Assessment completed with patient's daughter, Audrey. Patient has been living with Audrey in her house. Patient requires intermittent assist with ADLs and assistance with most IADLs. Patient has cane and walker at home. Currently receiving home care RN/PT/HHA with [facility]. [facility] notified of inpatient status.     Final discharge plan pending progression and recommendations.     Humera Reynolds RN

## 2022-01-03 NOTE — ED PROVIDER NOTES
EMERGENCY DEPARTMENT ENCOUNTER      NAME: Leon Sweeney  AGE: 71 year old female  YOB: 1950  MRN: [id number]  EVALUATION DATE & TIME: 2022 11:32 PM    PCP: System, Provider Not In    ED PROVIDER: Jorge L Mcbride M.D.      Chief Complaint   Patient presents with     Altered Mental Status       FINAL IMPRESSION:  1. Acute encephalopathy    2. Urinary tract infection without hematuria, site unspecified        ED COURSE & MEDICAL DECISION MAKIN year old female presents to the Emergency Department for evaluation of confusion.  History of seizure disorder, cirrhosis, recent admission for encephalopathy felt multifactorial and treated for pneumonia.  Presents with altered mental start us and urinary incontinence this afternoon.  She is fairly encephalopathic here, essentially nonverbal, is moving all extremities but unable or unwilling to follow commands.  Work-up for encephalopathy was undertaken as below.  Cath urine analysis does appear quite suspicious for infection.  Remainder of labs notable for some mild acute kidney injury on baseline CKD, mildly elevated ammonia and not likely high enough to be significantly contributing to her presentation.  Head CT negative for acute intracranial process.  Residual platelike infiltrate in her right lung base, she currently does not seem to be having any cough or respiratory symptoms and is satting 100% on room air so I do not think would require immediate treatment for this.  Her Covid test again is negative here.  She was given ceftriaxone for her urinary infection which seems the most likely source of her incontinence and encephalopathy.  She will be admitted to medicine for continued management.  Discussed case with hospitalist.      11:38 PM I met with the patient, obtained history, performed an initial exam, and discussed options and plan for diagnostics and treatment here in the ED.   11:52 PM I spoke with the patient's daughter.  1:45 AM  I spoke  with Dr. Lincoln with [facility] [place].   At the conclusion of the encounter I discussed the results of all of the tests and the disposition. The questions were answered. The patient or family acknowledged understanding and was agreeable with the care plan.       MEDICATIONS GIVEN IN THE EMERGENCY:  Medications   cefTRIAXone (ROCEPHIN) 1 g vial to attach to  mL bag for ADULTS or NS 50 mL bag for PEDS (1 g Intravenous New Bag 1/3/22 0147)   0.9% sodium chloride BOLUS (0 mLs Intravenous Stopped 1/3/22 0150)       NEW PRESCRIPTIONS STARTED AT TODAY'S ER VISIT  New Prescriptions    No medications on file          =================================================================    HPI    Patient information was obtained from: EMS    Use of : N/A       Leon Sweeney is a 71 year old female with a pertinent history of DM II, CKD, seizure disorder, HTN, and UTI's,  who presents to this ED via EMS for evaluation of altered mental status. HPI limited due to patient's altered mental status and confusion.    The patient reports here from home after it was noticed that she was more confused and out of it today.  She reportedly gets like this when she has a UTI.  She was found to have a blood sugar of 150 en route to the ED by EMS.  No other history is obtainable at this time as the patient is not answering any questions.     Per patient's daughter, the patient was doing fine until this afternoon when she found the patient altered.  The patient reportedly had urinated in the bed and was trying to clean the sheets but was confused on how to do so.  The daughter adds this altered mental status is not as severe as when she was admitted last month.  The daughter has not noticed the patient to have any fevers, recent falls or trauma, seizures, breathing difficulties, vomiting, or any other changes to the patient. The patient reportedly did not take her medication this morning, but her daughter was able to spoon feed her medication  this afternoon.    Per chart review, the patient was admitted at this hospital from 12/11-12/14/21 for altered mental status.  Her head CT was unremarkable for acute changes.  Her CT chest showed infiltrates and she had elevated ammonia.  Neurology was consulted and changed nothing but she had outpatient f/u and would continue home Keppra dose.  She had a EEG done as well.  She was started on Vancomycin and Zosyn for healthcare associated pneumonia and was switching to Cefdinir and Doxycycline on discharged.  She also was pit nacl pm Torsemide for her hypertension, per renal.  She was discharged back home on 12/14/21.    REVIEW OF SYSTEMS   Unable to obtain ROS due to patient's altered mental status and confusion.      PAST MEDICAL HISTORY:  Past Medical History:   Diagnosis Date     Acute kidney failure (H)      Anxiety      Cerebral infarction (H)      Chronic kidney disease     stage III; with anemia     Diabetes (H)     Type II with neuropathy     Encephalopathy      Gastroesophageal reflux disease      Heart failure (H)     unspecified     Hydronephrosis      Hyperkalemia      Hypertension      Insomnia      Malnutrition (H)      Pleural effusion      Portal hypertension (H)      Seizures (H)      Transient cerebral ischemic attack        PAST SURGICAL HISTORY:  No past surgical history on file.        CURRENT MEDICATIONS:    Current Facility-Administered Medications   Medication     cefTRIAXone (ROCEPHIN) 1 g vial to attach to  mL bag for ADULTS or NS 50 mL bag for PEDS     Current Outpatient Medications   Medication     acetaminophen (TYLENOL) 325 MG tablet     albuterol (PROAIR HFA/PROVENTIL HFA/VENTOLIN HFA) 108 (90 Base) MCG/ACT inhaler     amLODIPine (NORVASC) 5 MG tablet     calcium carbonate (OS-VI) 1500 (600 Ca) MG tablet     clopidogrel (PLAVIX) 75 MG tablet     famotidine (PEPCID) 20 MG tablet     Ferrous Sulfate 324 (65 Fe) MG TBEC     insulin aspart (NOVOLOG PEN) 100 UNIT/ML pen     insulin  "glargine (LANTUS PEN) 100 UNIT/ML pen     lactulose (CHRONULAC) 10 GM/15ML solution     levETIRAcetam (KEPPRA) 500 MG tablet     metoprolol tartrate (LOPRESSOR) 25 MG tablet     multivitamin w/minerals (THERA-VIT-M) tablet     rosuvastatin (CRESTOR) 20 MG tablet     torsemide (DEMADEX) 20 MG tablet     traZODone (DESYREL) 50 MG tablet         ALLERGIES:  Allergies   Allergen Reactions     Mirtazapine      Noted on [facility]  10/06/2021       FAMILY HISTORY:  No family history on file.    SOCIAL HISTORY:   Social History     Socioeconomic History     Marital status: Single     Spouse name: Not on file     Number of children: Not on file     Years of education: Not on file     Highest education level: Not on file   Occupational History     Not on file   Tobacco Use     Smoking status: Never Smoker     Smokeless tobacco: Not on file   Substance and Sexual Activity     Alcohol use: Not on file     Drug use: Not on file     Sexual activity: Not on file   Other Topics Concern     Not on file   Social History Narrative     Not on file     Social Determinants of Health     Financial Resource Strain: Not on file   Food Insecurity: Not on file   Transportation Needs: Not on file   Physical Activity: Not on file   Stress: Not on file   Social Connections: Not on file   Intimate Partner Violence: Not on file   Housing Stability: Not on file       VITALS:  BP (!) 149/69   Pulse 68   Temp 98.4  F (36.9  C) (Oral)   Resp 16   Ht 1.6 m (5' 3\")   Wt 68.5 kg (151 lb)   SpO2 100%   BMI 26.75 kg/m      PHYSICAL EXAM    Constitutional: Frail elderly female patient, laying in bed, somnolent but arousable to pain  HENT: Normocephalic, Atraumatic. Neck Supple.  Eyes: EOMI, Conjunctiva normal.  Respiratory: Breathing comfortably on room air. Speaks full sentences easily. Lungs clear to ascultation.  Cardiovascular: Normal heart rate, Regular rhythm. No peripheral edema.  Abdomen: Soft, " nontender  Musculoskeletal: Good range of motion in all major joints. No major deformities noted.  Integument: Warm, Dry.  Neurologic: Pupils are equal and reactive.  Face is symmetric.  Not following commands but does withdrawal to pain x4.  Psychiatric: Unable to assess.     LAB:  All pertinent labs reviewed and interpreted.  Labs Ordered and Resulted from Time of ED Arrival to Time of ED Departure   COMPREHENSIVE METABOLIC PANEL - Abnormal       Result Value    Sodium 142      Potassium 5.0      Chloride 107      Carbon Dioxide (CO2) 23      Anion Gap 12      Urea Nitrogen 26      Creatinine 2.17 (*)     Calcium 10.2      Glucose 104      Alkaline Phosphatase 324 (*)     AST 77 (*)     ALT 29      Protein Total 6.8      Albumin 2.8 (*)     Bilirubin Total 1.7 (*)     GFR Estimate 24 (*)    AMMONIA - Abnormal    Ammonia 43 (*)    ROUTINE UA WITH MICROSCOPIC REFLEX TO CULTURE - Abnormal    Color Urine Light Yellow      Appearance Urine Clear      Glucose Urine Negative      Bilirubin Urine Negative      Ketones Urine Negative      Specific Gravity Urine 1.010      Blood Urine 0.03 mg/dL (*)     pH Urine 7.0      Protein Albumin Urine 50  (*)     Urobilinogen Urine <2.0      Nitrite Urine Positive (*)     Leukocyte Esterase Urine 25 Ariella/uL (*)     Bacteria Urine Many (*)     Mucus Urine Present (*)     RBC Urine 4 (*)     WBC Urine 18 (*)     Squamous Epithelials Urine 1      Transitional Epithelials Urine <1      Hyaline Casts Urine 1     CRP INFLAMMATION - Abnormal    CRP 1.3 (*)    CBC WITH PLATELETS AND DIFFERENTIAL - Abnormal    WBC Count 3.3 (*)     RBC Count 3.81      Hemoglobin 8.9 (*)     Hematocrit 29.6 (*)     MCV 78      MCH 23.4 (*)     MCHC 30.1 (*)     RDW 15.5 (*)     Platelet Count 82 (*)     % Neutrophils 72      % Lymphocytes 17      % Monocytes 8      % Eosinophils 3      % Basophils 0      % Immature Granulocytes 0      NRBCs per 100 WBC 0      Absolute Neutrophils 2.4      Absolute Lymphocytes  0.6 (*)     Absolute Monocytes 0.3      Absolute Eosinophils 0.1      Absolute Basophils 0.0      Absolute Immature Granulocytes 0.0      Absolute NRBCs 0.0     TSH WITH FREE T4 REFLEX - Normal    TSH 2.85     COVID-19 VIRUS (CORONAVIRUS) BY PCR - Normal    SARS CoV2 PCR Negative     PROCALCITONIN - Normal    Procalcitonin 0.13     KEPPRA (LEVETIRACETAM) LEVEL   URINE CULTURE       RADIOLOGY:  Reviewed all pertinent imaging. Please see official radiology report.  XR Chest Port 1 View   Final Result   IMPRESSION: Cardiac enlargement. Small right pleural effusion. Platelike infiltrate in the right base.      Head CT w/o contrast   Final Result   IMPRESSION:   1.  No CT evidence for acute intracranial process.   2.  Stable chronic changes as above.          EKG:    Performed at: 0:01 on 12/3/22    Impression: Normal EKG    Rate: 69 bpm  Rhythm: NSR  Axis: 64-(-4)-57  MI Interval: 186 ms  QRS Interval: 68 ms  QTc Interval: 446/477 ms  ST Changes: None  Comparison: when compared with EKG from 12/12/21, no significant changes found.    I have independently reviewed and interpreted the EKG(s) documented above.    PROCEDURES:   None      I, López Barber, am serving as a scribe to document services personally performed by Dr. Jorge L Mcbride, based on my observation and the provider's statements to me. I, Jorge L Mcbride MD attest that López Barber is acting in a scribe capacity, has observed my performance of the services and has documented them in accordance with my direction.    Jorge L Mcbride M.D.  Emergency Medicine  [facility] EMERGENCY DEPARTMENT  [address] [place] [id number]  [phone]  Dept: [phone]      Jorge L Mcbride MD  01/03/22 [id number]

## 2022-01-03 NOTE — ED TRIAGE NOTES
Patient arrives via EMS from home with altered mental status. Symptoms started this evening per family. Family states patient is like this when she has a UTI. Patient here 12/11 and treated for pneumonia.

## 2022-01-03 NOTE — PROGRESS NOTES
Patient seen and examined and chart reviewed.  Ms. Sweeney is alert, interactive and denies any complaint except wanting a toothbrush and medicines.  She is disoriented to place and seems quite paranoid.  She declines need for interpretor and doesn't believe I'm her doctor. No asterixis on exam.    Re-ordered appropriate home medications.  Keppra level high, will ask neuro to assess.  Increased lactulose as no BM.    Continue ceftriaxone for possible UTI, repeat CMP and CBC in AM.     Please see Dr. Lincoln's H&P from this AM for details.    Geetha Burger MD  Internal Medicine Hospitalist  1/3/2022

## 2022-01-03 NOTE — ED NOTES
Bed: JNED-05  Expected date: 1/2/22  Expected time: 11:24 PM  Means of arrival: Ambulance  Comments:  Anatoly  72yo Fposs UTI/confusion

## 2022-01-04 ENCOUNTER — APPOINTMENT (OUTPATIENT)
Dept: PHYSICAL THERAPY | Facility: HOSPITAL | Age: 72
DRG: 071 | End: 2022-01-04
Attending: INTERNAL MEDICINE
Payer: COMMERCIAL

## 2022-01-04 ENCOUNTER — TELEPHONE (OUTPATIENT)
Dept: NEUROLOGY | Facility: CLINIC | Age: 72
End: 2022-01-04
Payer: COMMERCIAL

## 2022-01-04 ENCOUNTER — APPOINTMENT (OUTPATIENT)
Dept: CT IMAGING | Facility: HOSPITAL | Age: 72
DRG: 071 | End: 2022-01-04
Attending: INTERNAL MEDICINE
Payer: COMMERCIAL

## 2022-01-04 LAB
ALBUMIN SERPL-MCNC: 2.5 G/DL (ref 3.5–5)
ALP SERPL-CCNC: 274 U/L (ref 45–120)
ALT SERPL W P-5'-P-CCNC: 24 U/L (ref 0–45)
ANION GAP SERPL CALCULATED.3IONS-SCNC: 8 MMOL/L (ref 5–18)
AST SERPL W P-5'-P-CCNC: 57 U/L (ref 0–40)
BILIRUB SERPL-MCNC: 1.1 MG/DL (ref 0–1)
BUN SERPL-MCNC: 23 MG/DL (ref 8–28)
CALCIUM SERPL-MCNC: 9.2 MG/DL (ref 8.5–10.5)
CHLORIDE BLD-SCNC: 115 MMOL/L (ref 98–107)
CO2 SERPL-SCNC: 21 MMOL/L (ref 22–31)
CREAT SERPL-MCNC: 1.79 MG/DL (ref 0.6–1.1)
ERYTHROCYTE [DISTWIDTH] IN BLOOD BY AUTOMATED COUNT: 15.6 % (ref 10–15)
GFR SERPL CREATININE-BSD FRML MDRD: 30 ML/MIN/1.73M2
GLUCOSE BLD-MCNC: 105 MG/DL (ref 70–125)
GLUCOSE BLDC GLUCOMTR-MCNC: 244 MG/DL (ref 70–99)
GLUCOSE BLDC GLUCOMTR-MCNC: 258 MG/DL (ref 70–99)
GLUCOSE BLDC GLUCOMTR-MCNC: 264 MG/DL (ref 70–99)
GLUCOSE BLDC GLUCOMTR-MCNC: 92 MG/DL (ref 70–99)
HCT VFR BLD AUTO: 28.3 % (ref 35–47)
HGB BLD-MCNC: 8.7 G/DL (ref 11.7–15.7)
LEVETIRACETAM (KEPPRA): 42.6 UG/ML (ref 6–46)
MAGNESIUM SERPL-MCNC: 2.1 MG/DL (ref 1.8–2.6)
MCH RBC QN AUTO: 23.9 PG (ref 26.5–33)
MCHC RBC AUTO-ENTMCNC: 30.7 G/DL (ref 31.5–36.5)
MCV RBC AUTO: 78 FL (ref 78–100)
PLATELET # BLD AUTO: 75 10E3/UL (ref 150–450)
POTASSIUM BLD-SCNC: 3.7 MMOL/L (ref 3.5–5)
PROT SERPL-MCNC: 5.9 G/DL (ref 6–8)
RBC # BLD AUTO: 3.64 10E6/UL (ref 3.8–5.2)
SODIUM SERPL-SCNC: 144 MMOL/L (ref 136–145)
WBC # BLD AUTO: 2.5 10E3/UL (ref 4–11)

## 2022-01-04 PROCEDURE — 85027 COMPLETE CBC AUTOMATED: CPT | Performed by: HOSPITALIST

## 2022-01-04 PROCEDURE — 80053 COMPREHEN METABOLIC PANEL: CPT | Performed by: HOSPITALIST

## 2022-01-04 PROCEDURE — 250N000013 HC RX MED GY IP 250 OP 250 PS 637: Performed by: HOSPITALIST

## 2022-01-04 PROCEDURE — 250N000013 HC RX MED GY IP 250 OP 250 PS 637: Performed by: INTERNAL MEDICINE

## 2022-01-04 PROCEDURE — 120N000001 HC R&B MED SURG/OB

## 2022-01-04 PROCEDURE — 83735 ASSAY OF MAGNESIUM: CPT | Performed by: INTERNAL MEDICINE

## 2022-01-04 PROCEDURE — 250N000011 HC RX IP 250 OP 636: Performed by: HOSPITALIST

## 2022-01-04 PROCEDURE — 97116 GAIT TRAINING THERAPY: CPT | Mod: GP | Performed by: PHYSICAL THERAPIST

## 2022-01-04 PROCEDURE — 999N000127 HC STATISTIC PERIPHERAL IV START W US GUIDANCE

## 2022-01-04 PROCEDURE — 80177 DRUG SCRN QUAN LEVETIRACETAM: CPT | Performed by: PSYCHIATRY & NEUROLOGY

## 2022-01-04 PROCEDURE — 71250 CT THORAX DX C-: CPT

## 2022-01-04 PROCEDURE — 99231 SBSQ HOSP IP/OBS SF/LOW 25: CPT | Performed by: INTERNAL MEDICINE

## 2022-01-04 PROCEDURE — 36415 COLL VENOUS BLD VENIPUNCTURE: CPT | Performed by: PSYCHIATRY & NEUROLOGY

## 2022-01-04 PROCEDURE — 36415 COLL VENOUS BLD VENIPUNCTURE: CPT | Performed by: HOSPITALIST

## 2022-01-04 PROCEDURE — 99232 SBSQ HOSP IP/OBS MODERATE 35: CPT | Performed by: PSYCHIATRY & NEUROLOGY

## 2022-01-04 PROCEDURE — 97162 PT EVAL MOD COMPLEX 30 MIN: CPT | Mod: GP | Performed by: PHYSICAL THERAPIST

## 2022-01-04 RX ORDER — FAMOTIDINE 20 MG/1
20 TABLET, FILM COATED ORAL AT BEDTIME
Status: DISCONTINUED | OUTPATIENT
Start: 2022-01-04 | End: 2022-01-06 | Stop reason: HOSPADM

## 2022-01-04 RX ADMIN — METOPROLOL TARTRATE 25 MG: 25 TABLET, FILM COATED ORAL at 08:11

## 2022-01-04 RX ADMIN — LEVETIRACETAM 500 MG: 500 TABLET, FILM COATED ORAL at 08:11

## 2022-01-04 RX ADMIN — LACTULOSE 15 ML: 10 SOLUTION ORAL at 20:56

## 2022-01-04 RX ADMIN — INSULIN ASPART 3 UNITS: 100 INJECTION, SOLUTION INTRAVENOUS; SUBCUTANEOUS at 13:36

## 2022-01-04 RX ADMIN — ACETAMINOPHEN 650 MG: 325 TABLET ORAL at 22:35

## 2022-01-04 RX ADMIN — ROSUVASTATIN CALCIUM 20 MG: 10 TABLET, FILM COATED ORAL at 08:11

## 2022-01-04 RX ADMIN — LACTULOSE 15 ML: 10 SOLUTION ORAL at 08:10

## 2022-01-04 RX ADMIN — CLOPIDOGREL BISULFATE 75 MG: 75 TABLET ORAL at 08:11

## 2022-01-04 RX ADMIN — Medication 1 MG: at 17:01

## 2022-01-04 RX ADMIN — AMLODIPINE BESYLATE 5 MG: 5 TABLET ORAL at 08:11

## 2022-01-04 RX ADMIN — LEVETIRACETAM 500 MG: 500 TABLET, FILM COATED ORAL at 20:56

## 2022-01-04 RX ADMIN — METOPROLOL TARTRATE 25 MG: 25 TABLET, FILM COATED ORAL at 20:56

## 2022-01-04 RX ADMIN — ACETAMINOPHEN 650 MG: 325 TABLET ORAL at 17:01

## 2022-01-04 RX ADMIN — INSULIN ASPART 2 UNITS: 100 INJECTION, SOLUTION INTRAVENOUS; SUBCUTANEOUS at 20:55

## 2022-01-04 RX ADMIN — INSULIN ASPART 3 UNITS: 100 INJECTION, SOLUTION INTRAVENOUS; SUBCUTANEOUS at 16:59

## 2022-01-04 RX ADMIN — CEFTRIAXONE SODIUM 1 G: 1 INJECTION, POWDER, FOR SOLUTION INTRAMUSCULAR; INTRAVENOUS at 00:39

## 2022-01-04 RX ADMIN — FAMOTIDINE 20 MG: 20 TABLET, FILM COATED ORAL at 20:56

## 2022-01-04 ASSESSMENT — ACTIVITIES OF DAILY LIVING (ADL)
ADLS_ACUITY_SCORE: 8
ADLS_ACUITY_SCORE: 14
ADLS_ACUITY_SCORE: 8
ADLS_ACUITY_SCORE: 8
ADLS_ACUITY_SCORE: 12
ADLS_ACUITY_SCORE: 14
ADLS_ACUITY_SCORE: 14
ADLS_ACUITY_SCORE: 12
ADLS_ACUITY_SCORE: 8
ADLS_ACUITY_SCORE: 14
ADLS_ACUITY_SCORE: 8
ADLS_ACUITY_SCORE: 14
ADLS_ACUITY_SCORE: 14
ADLS_ACUITY_SCORE: 8
ADLS_ACUITY_SCORE: 14
ADLS_ACUITY_SCORE: 12
ADLS_ACUITY_SCORE: 12
ADLS_ACUITY_SCORE: 8

## 2022-01-04 NOTE — PLAN OF CARE
Problem: Adult Inpatient Plan of Care  Goal: Patient-Specific Goal (Individualized)  Outcome: Improving  Pt alert and oriented.  Apparently didn't sleep much overnight.  Wants to go home as is worried about the hospital bill.  Referred  Ashanti Carvalho to pt and she will address pt concerns.  Awaiting CT scan of chest results from earlier today.

## 2022-01-04 NOTE — UTILIZATION REVIEW
Admission Status; Secondary Review Determination   Under the authority of the Utilization Management Committee, the utilization review process indicated a secondary review on Leon Sweeney. The review outcome is based on review of the medical records, discussions with staff, and applying clinical experience noted on the date of the review.   (x) Inpatient Status Appropriate - This patient's medical care is consistent with medical management for inpatient care and reasonable inpatient medical practice.     RATIONALE FOR DETERMINATION   71 yr old female with hepatitis C, CKD presented to ER with confusion.  Ammonia elevated at 43.  Keppra level elevated at 74.  Cr above baseline of 1.7 was at 2.1.  Recent pneumonia with improved symptoms but CXR ongoing with abnormalities.  Abnormal UA and suspect UTI.  Ongoing IV abx.  CRP 1.3.  WBC dropped from 3.3 to 2.5.  Cultures in process.  CT chest to further evaluate the CXR findings.  Starting lactulose.    At the time of admission with the information available to the attending physician more than 2 nights Hospital complex care was anticipated, based on patient risk of adverse outcome if treated as outpatient and complex care required. Inpatient admission is appropriate based on the Medicare guidelines.   The information on this document is developed by the utilization review team in order for the business office to ensure compliance. This only denotes the appropriateness of proper admission status and does not reflect the quality of care rendered.   The definitions of Inpatient Status and Observation Status used in making the determination above are those provided in the CMS Coverage Manual, Chapter 1 and Chapter 6, section 70.4.   Sincerely,   Karen Barreto MD  Utilization Review  Physician Advisor  [facility]

## 2022-01-04 NOTE — CONSULTS
NEUROLOGY CONSULTATION NOTE     Leon Sweeney,  1950, MRN [id number] Date: 1/3/2022     [facility]   Code status:  Full Code   PCP: System, Provider Not In, None      ASSESSMENT & PLAN   Diagnosis code: Encephalopathy    Encephalopathy  History of seizure disorder  UTI  Elevated ammonia    Head CT negative for acute structural lesions  Previous EEG shows left temporal sharp wave activity  Continue Keppra  Keppra level  Check EEG  Ammonia elevated at 43  Recent TSH/B12 normal  Consider MRI if the patient is not improving  Suspect metabolic toxic encephalopathy.  Continue IV ceftriaxone for UTI  PT/OT  Seizure precautions     Chief Complaint   Patient presents with     Altered Mental Status        HISTORY OF PRESENT ILLNESS     We have been requested by Dr. Burger to evaluate Leon Sweeney who is a 71 year old  female for encephalopathy/confusion.    Patient was in the hospital for confusion in December.  She at that time was seen by Dr. Dumont.  Was found to have a UTI which was affecting her cognitive ability.  She did improve with treatment and was discharged home.    Patient presented to the emergency room yesterday with some confusion.  Patient has a history of hep C, pancytopenia, chronic kidney disease, essential hypertension, type 2 diabetes, GERD, stroke resulting in a seizure disorder.  Patient is on Keppra at home.  She reports that she has been taking her medications though per the doctor she was not taking her lactulose.  Patient was quite irritable at the time of admission and was honestly answering in 1 words.  She remains irritable for me.  She has been suspected to have a UTI.  She has been started on IV ceftriaxone.     PAST MEDICAL & SURGICAL HISTORY     Medical History  Past Medical History:   Diagnosis Date     Acute kidney failure (H)      Anxiety      Cerebral infarction (H)      Chronic kidney disease     stage III; with anemia     Diabetes (H)     Type II with neuropathy      "Encephalopathy      Gastroesophageal reflux disease      Heart failure (H)     unspecified     Hydronephrosis      Hyperkalemia      Hypertension      Insomnia      Malnutrition (H)      Pleural effusion      Portal hypertension (H)      Seizures (H)      Transient cerebral ischemic attack      Surgical History  No past surgical history on file.     SOCIAL HISTORY     Social History     Tobacco Use     Smoking status: Never Smoker     Smokeless tobacco: Not on file   Substance Use Topics     Alcohol use: Not on file     Drug use: Not on file   No known history of drug use or alcohol use.     FAMILY HISTORY     Reviewed, and noncontributory.     ALLERGIES     Allergies   Allergen Reactions     Mirtazapine      Noted on [facility]  10/06/2021        REVIEW OF SYSTEMS     Unable to do review of systems due to altered mental status.       HOME & HOSPITAL MEDICATIONS     Prior to Admission Medications  (Not in a hospital admission)      Hospital Medications    amLODIPine  5 mg Oral Daily     cefTRIAXone  1 g Intravenous Q24H     clopidogrel  75 mg Oral Daily     famotidine  20 mg Oral BID     insulin aspart  1-7 Units Subcutaneous TID AC     insulin aspart  1-5 Units Subcutaneous At Bedtime     lactulose  15 mL Oral BID     levETIRAcetam  500 mg Oral BID     metoprolol tartrate  25 mg Oral BID     rosuvastatin  20 mg Oral Daily     sodium chloride (PF)  3 mL Intracatheter Q8H        PHYSICAL EXAM     Vital signs  Temp:  [97.9  F (36.6  C)-98.4  F (36.9  C)] 97.9  F (36.6  C)  Pulse:  [63-79] 79  Resp:  [16] 16  BP: (125-164)/() 156/101  SpO2:  [99 %-100 %] 100 %    PHYSICAL EXAMINATION  VITALS: BP (!) 156/101   Pulse 79   Temp 97.9  F (36.6  C) (Oral)   Resp 16   Ht 1.6 m (5' 3\")   Wt 68.5 kg (151 lb)   SpO2 100%   BMI 26.75 kg/m    GENERAL -Health appearing, No apparent distress  EYES- No scleral icterus, no eyelid droop, Pupils - see Neuro section  HEENT - Normocephalic, " atraumatic, Hearing grossly intact; Oral mucosa moist and pink in color. External Ears and nose intact.   Neck - supple with no obvious lymphadenopathy or thyromegaly  PULM - Good spontaneous respiratory effort; Normal palpation of chest.   CV- Pedal pulses present with no peripheral edema/ No significant varicosities.  MSK- Gait - see Neuro section; Strength and tone- see Neuro section; Range of motion grossly intact.  PSYCH -slightly agitated and uncooperative.    Neurological  Mental status - Patient is awake but initially does not want to answer the orientation questions.  Attention is slightly decreased.  Follows some simple commands appropriately.  Language is limited to a few phrases.  Does speak English but does not speak fluently.  Cranial nerves - Pupils are reactive and symmetric; EOMI, VFIT, NLF symmetric  Motor - There is no pronator drift. Motor exam shows 5/5 strength in all extremities.  Tone - Tone is symmetric bilaterally in upper and lower extremities.  Reflexes - Reflexes are absent  Sensation - Sensory exam is grossly intact to light touch, pain.  Coordination - Finger to nose and heel to shin is without dysmetria.  Gait and station --needs assistance to ambulate.  Formal gait testing cannot be done due to safety concerns from ongoing medical issues.       DIAGNOSTIC STUDIES     Pertinent Radiology   CT head                                                  IMPRESSION:  1.  No CT evidence for acute intracranial process.  2.  Stable chronic changes as above.    EEG  Impression: This is an abnormal EEG due to left temporal sharp activity that suggest a low threshold for focal seizure.  Underlying background is normal and no electrographic seizures were recorded.     Classification: Dysrhythmia grade III left temporal    Previous neurology notes reviewed.  Patient has been seen by Dr. Dumont in the past.  Has had encephalopathy related to UTI December of last year.  Was not very responsive at that  time.  Does have a history of seizures.  Is on Keppra.  Is also poorly controlled diabetes.  B12 was normal at that point.  Is also had a stroke in 2012    Recent Results (from the past 24 hour(s))   Comprehensive metabolic panel    Collection Time: 01/03/22 12:17 AM   Result Value Ref Range    Sodium 142 136 - 145 mmol/L    Potassium 5.0 3.5 - 5.0 mmol/L    Chloride 107 98 - 107 mmol/L    Carbon Dioxide (CO2) 23 22 - 31 mmol/L    Anion Gap 12 5 - 18 mmol/L    Urea Nitrogen 26 8 - 28 mg/dL    Creatinine 2.17 (H) 0.60 - 1.10 mg/dL    Calcium 10.2 8.5 - 10.5 mg/dL    Glucose 104 70 - 125 mg/dL    Alkaline Phosphatase 324 (H) 45 - 120 U/L    AST 77 (H) 0 - 40 U/L    ALT 29 0 - 45 U/L    Protein Total 6.8 6.0 - 8.0 g/dL    Albumin 2.8 (L) 3.5 - 5.0 g/dL    Bilirubin Total 1.7 (H) 0.0 - 1.0 mg/dL    GFR Estimate 24 (L) >60 mL/min/1.73m2   TSH with free T4 reflex    Collection Time: 01/03/22 12:17 AM   Result Value Ref Range    TSH 2.85 0.30 - 5.00 uIU/mL   CRP inflammation    Collection Time: 01/03/22 12:17 AM   Result Value Ref Range    CRP 1.3 (H) 0.0-<0.8 mg/dL   Asymptomatic COVID-19 Virus (Coronavirus) by PCR Nasopharyngeal    Collection Time: 01/03/22 12:17 AM    Specimen: Nasopharyngeal; Swab   Result Value Ref Range    SARS CoV2 PCR Negative Negative   CBC with platelets and differential    Collection Time: 01/03/22 12:17 AM   Result Value Ref Range    WBC Count 3.3 (L) 4.0 - 11.0 10e3/uL    RBC Count 3.81 3.80 - 5.20 10e6/uL    Hemoglobin 8.9 (L) 11.7 - 15.7 g/dL    Hematocrit 29.6 (L) 35.0 - 47.0 %    MCV 78 78 - 100 fL    MCH 23.4 (L) 26.5 - 33.0 pg    MCHC 30.1 (L) 31.5 - 36.5 g/dL    RDW 15.5 (H) 10.0 - 15.0 %    Platelet Count 82 (L) 150 - 450 10e3/uL    % Neutrophils 72 %    % Lymphocytes 17 %    % Monocytes 8 %    % Eosinophils 3 %    % Basophils 0 %    % Immature Granulocytes 0 %    NRBCs per 100 WBC 0 <1 /100    Absolute Neutrophils 2.4 1.6 - 8.3 10e3/uL    Absolute Lymphocytes 0.6 (L) 0.8 - 5.3 10e3/uL     Absolute Monocytes 0.3 0.0 - 1.3 10e3/uL    Absolute Eosinophils 0.1 0.0 - 0.7 10e3/uL    Absolute Basophils 0.0 0.0 - 0.2 10e3/uL    Absolute Immature Granulocytes 0.0 <=0.4 10e3/uL    Absolute NRBCs 0.0 10e3/uL   Keppra (Levetiracetam) Level    Collection Time: 01/03/22 12:17 AM   Result Value Ref Range    Levetiracetam 74.0 (H) 6.0 - 46.0 ug/mL   Procalcitonin    Collection Time: 01/03/22 12:17 AM   Result Value Ref Range    Procalcitonin 0.13 0.00 - 0.49 ng/mL   Ammonia (on ice)    Collection Time: 01/03/22 12:54 AM   Result Value Ref Range    Ammonia 43 (H) 11 - 35 umol/L   UA with Microscopic reflex to Culture    Collection Time: 01/03/22  1:04 AM    Specimen: Urine, Clean Catch   Result Value Ref Range    Color Urine Light Yellow Colorless, Straw, Light Yellow, Yellow    Appearance Urine Clear Clear    Glucose Urine Negative Negative mg/dL    Bilirubin Urine Negative Negative    Ketones Urine Negative Negative mg/dL    Specific Gravity Urine 1.010 1.001 - 1.030    Blood Urine 0.03 mg/dL (A) Negative    pH Urine 7.0 5.0 - 7.0    Protein Albumin Urine 50  (A) Negative mg/dL    Urobilinogen Urine <2.0 <2.0 mg/dL    Nitrite Urine Positive (A) Negative    Leukocyte Esterase Urine 25 Ariella/uL (A) Negative    Bacteria Urine Many (A) None Seen /HPF    Mucus Urine Present (A) None Seen /LPF    RBC Urine 4 (H) <=2 /HPF    WBC Urine 18 (H) <=5 /HPF    Squamous Epithelials Urine 1 <=1 /HPF    Transitional Epithelials Urine <1 <=1 /HPF    Hyaline Casts Urine 1 <=2 /LPF   Glucose by meter    Collection Time: 01/03/22  7:30 AM   Result Value Ref Range    GLUCOSE BY METER POCT 80 70 - 99 mg/dL   Glucose by meter    Collection Time: 01/03/22 11:31 AM   Result Value Ref Range    GLUCOSE BY METER POCT 119 (H) 70 - 99 mg/dL   Glucose by meter    Collection Time: 01/03/22  5:31 PM   Result Value Ref Range    GLUCOSE BY METER POCT 197 (H) 70 - 99 mg/dL       Total time spent for face to face visit, reviewing labs/imaging studies,  counseling and coordination of care was: Over 70 min More than 50% of this time was spent on counseling and coordination of care.    Getting history the patient trying to examine her.  She is agitated.  Does not want to answer questions that I asked her.  Coordination of care with the primary team.  Reviewing chart.  Reviewing previous neurology notes.  Reviewing imaging studies.    Varun Rankin MD  Neurologist  [facility]  Tel:- [phone]

## 2022-01-04 NOTE — ED NOTES
"[facility] ED Handoff Report    ED Chief Complaint: Altered mental status    ED Diagnosis:  (G93.40) Acute encephalopathy  Comment: Elevated ammonia, UTI  Plan: admit    (N39.0) Urinary tract infection without hematuria, site unspecified  Comment: ceftriaxone administered  Plan: admit       PMH:    Past Medical History:   Diagnosis Date     Acute kidney failure (H)      Anxiety      Cerebral infarction (H)      Chronic kidney disease     stage III; with anemia     Diabetes (H)     Type II with neuropathy     Encephalopathy      Gastroesophageal reflux disease      Heart failure (H)     unspecified     Hydronephrosis      Hyperkalemia      Hypertension      Insomnia      Malnutrition (H)      Pleural effusion      Portal hypertension (H)      Seizures (H)      Transient cerebral ischemic attack         Code Status:  Full Code     Falls Risk: Yes Band: Applied    Current Living Situation/Residence: lives with their son or daughter     Elimination Status: Continent: Yes     Activity Level: SBA w/ walker    Patients Preferred Language:  Other: Cambodian     Needed: Yes    Vital Signs:  BP (!) 145/67   Pulse 79   Temp 97.9  F (36.6  C) (Oral)   Resp 16   Ht 1.6 m (5' 3\")   Wt 68.5 kg (151 lb)   SpO2 100%   BMI 26.75 kg/m       Cardiac Rhythm: n/a    Pain Score: 0/10    Is the Patient Confused:  Yes    Last Food or Drink: 01/03/22 at 1700    Focused Assessment:  Oriented to self, place, and date. Less confused throughout the shift.     Tests Performed: Done: Labs and Imaging    Treatments Provided:  Antibiotics, PRN tylenol, lactulose, insulin, other home medications -- see MAR    Family Dynamics/Concerns: No    Family Updated On Visitor Policy: No    Plan of Care Communicated to Family: No    Who Was Updated about Plan of Care: Will call daughter to update on transfer    Belongings Checklist Done and Signed by Patient: No    Covid: asymptomatic , negative    Additional Information: Patient " mentation has been improving since this morning, some confusion remains    RN: Hanane Rasheed   1/3/2022 7:52 PM

## 2022-01-04 NOTE — PROGRESS NOTES
[facility]    Hospitalist Progress Note     Assessment & Plan       Leon Sweeney is a 71 year old female admitted on 1/2/2022. She was brought to the emergency department for evaluation of altered mental status, concern for UTI.     1.  Acute metabolic encephalopathy  -CT head without evidence for acute intracranial process  -Likely in the setting of UTI  -Ammonia 43, daughter reports not taking lactulose in a while  -Supportive management  -seems improved today  -get PT/OT eval     2.  Acute cystitis without hematuria  -Continue IV ceftriaxone  -Follow-up urine culture     3.  Acute kidney injury on chronic kidney disease stage IV  -Received 1 L normal saline bolus in the ED, hold off further IV fluids  -Hold diuretics for now  -Avoid nephrotoxins  -treat UTI  -creat today improved at 1.79     4.  Cirrhosis, chronic hepatitis C  -No signs of ascites but chronic edema with stasis dermatitis  -Holding diuretics secondary to MARIA DE JESUS, monitor volume status  -Resume lactulose--she did take it this am     5.  Pancytopenia-ongoing  -Secondary to liver disease likely     6.  Abnormal chest x-ray  -Platelike infiltrate in the right base  -Recently treated for pneumonia  -SARS-CoV-2 PCR negative  -Procalcitonin not elevated  -check ct chest--no contrast to check infiltrate     7.  Seizures disorder  - Keppra level--ok    8.DVT prevent- scd, hold on hep /lovenox with thrombocytopenia    Social --I called to update daughter at 1541        Disposition Plan   Expected discharge: 1-2 days, recommended to prior living arrangement once UC back and improved encephalopathy.     Entered: Sydnie Rodríguez 01/04/2022, 10:56 AM   Information in the above section will display in the discharge planner report.    Sydnie Rodríguez MD    Interval History   She refused  with me  Denied pain  Notes at times can't void when she wants  I had rn check post void residual, only 100  No abd pain  No stool  yet      Physical Exam   Temp: 98.1  F (36.7  C) Temp src: Oral BP: (!) 146/70 Pulse: 64   Resp: 20 SpO2: 99 % O2 Device: None (Room air)    Vitals:    01/02/22 2337   Weight: 68.5 kg (151 lb)     Vital Signs with Ranges  Temp:  [98.1  F (36.7  C)-98.7  F (37.1  C)] 98.1  F (36.7  C)  Pulse:  [64-79] 64  Resp:  [18-22] 20  BP: (125-164)/() 146/70  SpO2:  [97 %-100 %] 99 %  No intake/output data recorded.    Constitutional: awake, fatigued, alert, cooperative and no apparent distress  Eyes: sclera clear  Respiratory: No increased work of breathing, good air exchange, clear to auscultation bilaterally, no crackles or wheezing  Cardiovascular: Normal apical impulse, regular rate and rhythm, normal S1 and S2, no S3 or S4, and no murmur noted  GI: normal bowel sounds, soft, distended and non-tender  Skin: no bruising or bleeding  Musculoskeletal: ankle edema  Neurologic: Mental Status Exam:  Level of Alertness:   awake  Motor Exam:  moves all extremities well and symmetrically  Neuropsychiatric: General: normal, calm and normal eye contact  Affect: normal and pleasant    Medications       amLODIPine  5 mg Oral Daily     cefTRIAXone  1 g Intravenous Q24H     clopidogrel  75 mg Oral Daily     famotidine  20 mg Oral At Bedtime     insulin aspart  1-7 Units Subcutaneous TID AC     insulin aspart  1-5 Units Subcutaneous At Bedtime     lactulose  15 mL Oral BID     levETIRAcetam  500 mg Oral BID     metoprolol tartrate  25 mg Oral BID     rosuvastatin  20 mg Oral Daily     sodium chloride (PF)  3 mL Intracatheter Q8H       Data   Results for orders placed or performed during the hospital encounter of 01/02/22   Head CT w/o contrast     Status: None    Narrative    EXAM: CT HEAD W/O CONTRAST  LOCATION: [facility]  DATE/TIME: 1/3/2022 12:38 AM    INDICATION: Altered mental status  COMPARISON: 12/01/2021  TECHNIQUE: Routine CT Head without IV contrast. Multiplanar reformats. Dose reduction  techniques were used.    FINDINGS:  INTRACRANIAL CONTENTS: No intracranial hemorrhage, extraaxial collection, or mass effect.  No CT evidence of acute infarct. Moderate presumed chronic small vessel ischemic changes. Mild to moderate generalized volume loss. No hydrocephalus.     VISUALIZED ORBITS/SINUSES/MASTOIDS: Prior bilateral cataract surgery. Visualized portions of the orbits are otherwise unremarkable. Mild mucosal thickening scattered about the paranasal sinuses. No middle ear or mastoid effusion.    BONES/SOFT TISSUES: No acute abnormality.      Impression    IMPRESSION:  1.  No CT evidence for acute intracranial process.  2.  Stable chronic changes as above.   XR Chest Port 1 View     Status: None    Narrative    EXAM: XR CHEST PORT 1 VIEW  LOCATION: [facility]  DATE/TIME: 1/3/2022 12:41 AM    INDICATION: AMS, recently treated for pneumonia.  COMPARISON: None.      Impression    IMPRESSION: Cardiac enlargement. Small right pleural effusion. Platelike infiltrate in the right base.   Comprehensive metabolic panel     Status: Abnormal   Result Value Ref Range    Sodium 142 136 - 145 mmol/L    Potassium 5.0 3.5 - 5.0 mmol/L    Chloride 107 98 - 107 mmol/L    Carbon Dioxide (CO2) 23 22 - 31 mmol/L    Anion Gap 12 5 - 18 mmol/L    Urea Nitrogen 26 8 - 28 mg/dL    Creatinine 2.17 (H) 0.60 - 1.10 mg/dL    Calcium 10.2 8.5 - 10.5 mg/dL    Glucose 104 70 - 125 mg/dL    Alkaline Phosphatase 324 (H) 45 - 120 U/L    AST 77 (H) 0 - 40 U/L    ALT 29 0 - 45 U/L    Protein Total 6.8 6.0 - 8.0 g/dL    Albumin 2.8 (L) 3.5 - 5.0 g/dL    Bilirubin Total 1.7 (H) 0.0 - 1.0 mg/dL    GFR Estimate 24 (L) >60 mL/min/1.73m2   Ammonia (on ice)     Status: Abnormal   Result Value Ref Range    Ammonia 43 (H) 11 - 35 umol/L   UA with Microscopic reflex to Culture     Status: Abnormal    Specimen: Urine, Clean Catch   Result Value Ref Range    Color Urine Light Yellow Colorless, Straw, Light Yellow, Yellow     Appearance Urine Clear Clear    Glucose Urine Negative Negative mg/dL    Bilirubin Urine Negative Negative    Ketones Urine Negative Negative mg/dL    Specific Gravity Urine 1.010 1.001 - 1.030    Blood Urine 0.03 mg/dL (A) Negative    pH Urine 7.0 5.0 - 7.0    Protein Albumin Urine 50  (A) Negative mg/dL    Urobilinogen Urine <2.0 <2.0 mg/dL    Nitrite Urine Positive (A) Negative    Leukocyte Esterase Urine 25 Ariella/uL (A) Negative    Bacteria Urine Many (A) None Seen /HPF    Mucus Urine Present (A) None Seen /LPF    RBC Urine 4 (H) <=2 /HPF    WBC Urine 18 (H) <=5 /HPF    Squamous Epithelials Urine 1 <=1 /HPF    Transitional Epithelials Urine <1 <=1 /HPF    Hyaline Casts Urine 1 <=2 /LPF    Narrative    Urine Culture ordered based on laboratory criteria   TSH with free T4 reflex     Status: Normal   Result Value Ref Range    TSH 2.85 0.30 - 5.00 uIU/mL   CRP inflammation     Status: Abnormal   Result Value Ref Range    CRP 1.3 (H) 0.0-<0.8 mg/dL   Asymptomatic COVID-19 Virus (Coronavirus) by PCR Nasopharyngeal     Status: Normal    Specimen: Nasopharyngeal; Swab   Result Value Ref Range    SARS CoV2 PCR Negative Negative    Narrative    Testing was performed using the [employer]  SARS-CoV-2 & Influenza A/B Assay on the [employer]  [employer]  System.  This test should be ordered for the detection of SARS-COV-2 in individuals who meet SARS-CoV-2 clinical and/or epidemiological criteria. Test performance is unknown in asymptomatic patients.  This test is for in vitro diagnostic use under the FDA EUA for laboratories certified under CLIA to perform moderate and/or high complexity testing. This test has not been FDA cleared or approved.  A negative test does not rule out the presence of PCR inhibitors in the specimen or target RNA in concentration below the limit of detection for the assay. The possibility of a false negative should be considered if the patient's recent exposure or clinical presentation suggests COVID-19.  [facility]  [facility] are certified under the Clinical Laboratory Improvement Amendments of 1988 (CLIA-88) as qualified to perform moderate and/or high complexity laboratory testing.   CBC with platelets and differential     Status: Abnormal   Result Value Ref Range    WBC Count 3.3 (L) 4.0 - 11.0 10e3/uL    RBC Count 3.81 3.80 - 5.20 10e6/uL    Hemoglobin 8.9 (L) 11.7 - 15.7 g/dL    Hematocrit 29.6 (L) 35.0 - 47.0 %    MCV 78 78 - 100 fL    MCH 23.4 (L) 26.5 - 33.0 pg    MCHC 30.1 (L) 31.5 - 36.5 g/dL    RDW 15.5 (H) 10.0 - 15.0 %    Platelet Count 82 (L) 150 - 450 10e3/uL    % Neutrophils 72 %    % Lymphocytes 17 %    % Monocytes 8 %    % Eosinophils 3 %    % Basophils 0 %    % Immature Granulocytes 0 %    NRBCs per 100 WBC 0 <1 /100    Absolute Neutrophils 2.4 1.6 - 8.3 10e3/uL    Absolute Lymphocytes 0.6 (L) 0.8 - 5.3 10e3/uL    Absolute Monocytes 0.3 0.0 - 1.3 10e3/uL    Absolute Eosinophils 0.1 0.0 - 0.7 10e3/uL    Absolute Basophils 0.0 0.0 - 0.2 10e3/uL    Absolute Immature Granulocytes 0.0 <=0.4 10e3/uL    Absolute NRBCs 0.0 10e3/uL   Keppra (Levetiracetam) Level     Status: Abnormal   Result Value Ref Range    Levetiracetam 74.0 (H) 6.0 - 46.0 ug/mL   Procalcitonin     Status: Normal   Result Value Ref Range    Procalcitonin 0.13 0.00 - 0.49 ng/mL   Glucose by meter     Status: Normal   Result Value Ref Range    GLUCOSE BY METER POCT 80 70 - 99 mg/dL   Glucose by meter     Status: Abnormal   Result Value Ref Range    GLUCOSE BY METER POCT 119 (H) 70 - 99 mg/dL   Glucose by meter     Status: Abnormal   Result Value Ref Range    GLUCOSE BY METER POCT 197 (H) 70 - 99 mg/dL   Glucose by meter     Status: Abnormal   Result Value Ref Range    GLUCOSE BY METER POCT 229 (H) 70 - 99 mg/dL   Keppra (Levetiracetam) Level     Status: Normal   Result Value Ref Range    Levetiracetam 42.6 6.0 - 46.0 ug/mL   Comprehensive metabolic panel     Status: Abnormal   Result Value Ref Range    Sodium 144 136 - 145 mmol/L     Potassium 3.7 3.5 - 5.0 mmol/L    Chloride 115 (H) 98 - 107 mmol/L    Carbon Dioxide (CO2) 21 (L) 22 - 31 mmol/L    Anion Gap 8 5 - 18 mmol/L    Urea Nitrogen 23 8 - 28 mg/dL    Creatinine 1.79 (H) 0.60 - 1.10 mg/dL    Calcium 9.2 8.5 - 10.5 mg/dL    Glucose 105 70 - 125 mg/dL    Alkaline Phosphatase 274 (H) 45 - 120 U/L    AST 57 (H) 0 - 40 U/L    ALT 24 0 - 45 U/L    Protein Total 5.9 (L) 6.0 - 8.0 g/dL    Albumin 2.5 (L) 3.5 - 5.0 g/dL    Bilirubin Total 1.1 (H) 0.0 - 1.0 mg/dL    GFR Estimate 30 (L) >60 mL/min/1.73m2   CBC with platelets     Status: Abnormal   Result Value Ref Range    WBC Count 2.5 (L) 4.0 - 11.0 10e3/uL    RBC Count 3.64 (L) 3.80 - 5.20 10e6/uL    Hemoglobin 8.7 (L) 11.7 - 15.7 g/dL    Hematocrit 28.3 (L) 35.0 - 47.0 %    MCV 78 78 - 100 fL    MCH 23.9 (L) 26.5 - 33.0 pg    MCHC 30.7 (L) 31.5 - 36.5 g/dL    RDW 15.6 (H) 10.0 - 15.0 %    Platelet Count 75 (L) 150 - 450 10e3/uL   Magnesium     Status: Normal   Result Value Ref Range    Magnesium 2.1 1.8 - 2.6 mg/dL   Glucose by meter     Status: Normal   Result Value Ref Range    GLUCOSE BY METER POCT 92 70 - 99 mg/dL   Care Management / Social Work IP Consult     Status: None ()    Humera Dhaliwal RN     1/3/2022 10:14 AM  Care Management Initial Consult    General Information  Assessment completed with: Deborah Arreaga  Type of CM/SW Visit: Initial Assessment    Primary Care Provider verified and updated as needed: Yes   Readmission within the last 30 days: current reason for admission   unrelated to previous admission         Advance Care Planning:            Communication Assessment  Patient's communication style: spoken language (English or   Bilingual)    Hearing Difficulty or Deaf: no   Wear Glasses or Blind: no    Cognitive  Cognitive/Neuro/Behavioral: mood/behavior             Mood/Behavior: flat affect          Living Environment:   People in home: child(asha), adult  Uadrey  Current living Arrangements: house      Able to  return to prior arrangements: yes       Family/Social Support:  Care provided by: child(asha)  Provides care for: no one     Children          Description of Support System: Supportive,Involved         Current Resources:   Patient receiving home care services: Yes  Skilled Home Care Services: Skilled Nursing,Home Health   Aid,Physicial Therapy  Community Resources: None  Equipment currently used at home: cane, straight  Supplies currently used at home: None    Employment/Financial:  Employment Status:          Financial Concerns:             Lifestyle & Psychosocial Needs:  Social Determinants of Health     Tobacco Use: Unknown     Smoking Tobacco Use: Never Smoker     Smokeless Tobacco Use: Unknown   Alcohol Use: Not on file   Financial Resource Strain: Not on file   Food Insecurity: Not on file   Transportation Needs: Not on file   Physical Activity: Not on file   Stress: Not on file   Social Connections: Not on file   Intimate Partner Violence: Not on file   Depression: Not on file   Housing Stability: Not on file       Functional Status:  Prior to admission patient needed assistance:   Dependent ADLs::  (intermittent assist)  Dependent IADLs:: Cleaning,Cooking,Laundry,Shopping,Meal   Preparation,Medication Management,Money Management,Transportation                Additional Information:  Assessment completed with patient's daughter, Audrey. Patient has   been living with Audrey in her house. Patient requires   intermittent assist with ADLs and assistance with most IADLs.   Patient has cane and walker at home. Currently receiving home   care RN/PT/HHA with [facility]. [facility] notified of inpatient   status.     Final discharge plan pending progression and recommendations.     Humera Reynolds RN         Social Work/ Care Management IP Consult     Status: None ()    Humera Dhaliwal RN     1/3/2022 10:14 AM  Care Management Initial Consult    General Information  Assessment completed with: Children,  deer-Audrey  Type of CM/SW Visit: Initial Assessment    Primary Care Provider verified and updated as needed: Yes   Readmission within the last 30 days: current reason for admission   unrelated to previous admission         Advance Care Planning:            Communication Assessment  Patient's communication style: spoken language (English or   Bilingual)    Hearing Difficulty or Deaf: no   Wear Glasses or Blind: no    Cognitive  Cognitive/Neuro/Behavioral: mood/behavior             Mood/Behavior: flat affect          Living Environment:   People in home: child(asha), adult  Audrey  Current living Arrangements: house      Able to return to prior arrangements: yes       Family/Social Support:  Care provided by: child(asha)  Provides care for: no one     Children          Description of Support System: Supportive,Involved         Current Resources:   Patient receiving home care services: Yes  Skilled Home Care Services: Skilled Nursing,Home Health   Aid,Physicial Therapy  Community Resources: None  Equipment currently used at home: cane, straight  Supplies currently used at home: None    Employment/Financial:  Employment Status:          Financial Concerns:             Lifestyle & Psychosocial Needs:  Social Determinants of Health     Tobacco Use: Unknown     Smoking Tobacco Use: Never Smoker     Smokeless Tobacco Use: Unknown   Alcohol Use: Not on file   Financial Resource Strain: Not on file   Food Insecurity: Not on file   Transportation Needs: Not on file   Physical Activity: Not on file   Stress: Not on file   Social Connections: Not on file   Intimate Partner Violence: Not on file   Depression: Not on file   Housing Stability: Not on file       Functional Status:  Prior to admission patient needed assistance:   Dependent ADLs::  (intermittent assist)  Dependent IADLs:: Cleaning,Cooking,Laundry,Shopping,Meal   Preparation,Medication Management,Money Management,Transportation                Additional  Information:  Assessment completed with patient's daughter, Audrey. Patient has   been living with Audrey in her house. Patient requires   intermittent assist with ADLs and assistance with most IADLs.   Patient has cane and walker at home. Currently receiving home   care RN/PT/HHA with [facility]. [facility] notified of inpatient   status.     Final discharge plan pending progression and recommendations.     Humera Reynolds RN         Neurology IP Consult: General (non-stroke/non-ICU); Patient to be seen: Routine - within 24 hours; encephalopathy, hx seizure.  high keppra level.; Consultant may enter orders: Yes; Requesting provider? Hospitalist (if different from attending phy...     Status: None ()    Narrative    Varun Rankin MD     1/3/2022 11:54 PM  NEUROLOGY CONSULTATION NOTE     Leon Sweeney,  1950, MRN [id number] Date: 1/3/2022     [facility]   Code status:  Full Code   PCP: System, Provider Not In, None      ASSESSMENT & PLAN   Diagnosis code: Encephalopathy    Encephalopathy  History of seizure disorder  UTI  Elevated ammonia      Head CT negative for acute structural lesions    Previous EEG shows left temporal sharp wave activity    Continue Keppra    Keppra level    Check EEG    Ammonia elevated at 43    Recent TSH/B12 normal    Consider MRI if the patient is not improving    Suspect metabolic toxic encephalopathy.    Continue IV ceftriaxone for UTI    PT/OT    Seizure precautions     Chief Complaint   Patient presents with     Altered Mental Status        HISTORY OF PRESENT ILLNESS     We have been requested by Dr. Burger to evaluate Leon Sweeney who   is a 71 year old  female for encephalopathy/confusion.    Patient was in the hospital for confusion in December.  She at   that time was seen by Dr. Dumont.  Was found to have a UTI which   was affecting her cognitive ability.  She did improve with   treatment and was discharged home.    Patient presented to the emergency room yesterday with  some   confusion.  Patient has a history of hep C, pancytopenia, chronic   kidney disease, essential hypertension, type 2 diabetes, GERD,   stroke resulting in a seizure disorder.  Patient is on Keppra at   home.  She reports that she has been taking her medications   though per the doctor she was not taking her lactulose.  Patient   was quite irritable at the time of admission and was honestly   answering in 1 words.  She remains irritable for me.  She has   been suspected to have a UTI.  She has been started on IV   ceftriaxone.     PAST MEDICAL & SURGICAL HISTORY     Medical History  Past Medical History:   Diagnosis Date     Acute kidney failure (H)      Anxiety      Cerebral infarction (H)      Chronic kidney disease     stage III; with anemia     Diabetes (H)     Type II with neuropathy     Encephalopathy      Gastroesophageal reflux disease      Heart failure (H)     unspecified     Hydronephrosis      Hyperkalemia      Hypertension      Insomnia      Malnutrition (H)      Pleural effusion      Portal hypertension (H)      Seizures (H)      Transient cerebral ischemic attack      Surgical History  No past surgical history on file.     SOCIAL HISTORY     Social History     Tobacco Use     Smoking status: Never Smoker     Smokeless tobacco: Not on file   Substance Use Topics     Alcohol use: Not on file     Drug use: Not on file   No known history of drug use or alcohol use.     FAMILY HISTORY     Reviewed, and noncontributory.     ALLERGIES     Allergies   Allergen Reactions     Mirtazapine      Noted on [facility]  10/06/2021        REVIEW OF SYSTEMS     Unable to do review of systems due to altered mental status.       HOME & HOSPITAL MEDICATIONS     Prior to Admission Medications  (Not in a hospital admission)      Hospital Medications    amLODIPine  5 mg Oral Daily     cefTRIAXone  1 g Intravenous Q24H     clopidogrel  75 mg Oral Daily     famotidine  20 mg Oral BID     insulin  "aspart  1-7 Units Subcutaneous TID AC     insulin aspart  1-5 Units Subcutaneous At Bedtime     lactulose  15 mL Oral BID     levETIRAcetam  500 mg Oral BID     metoprolol tartrate  25 mg Oral BID     rosuvastatin  20 mg Oral Daily     sodium chloride (PF)  3 mL Intracatheter Q8H        PHYSICAL EXAM     Vital signs  Temp:  [97.9  F (36.6  C)-98.4  F (36.9  C)] 97.9  F (36.6  C)  Pulse:  [63-79] 79  Resp:  [16] 16  BP: (125-164)/() 156/101  SpO2:  [99 %-100 %] 100 %    PHYSICAL EXAMINATION  VITALS: BP (!) 156/101   Pulse 79   Temp 97.9  F (36.6  C)   (Oral)   Resp 16   Ht 1.6 m (5' 3\")   Wt 68.5 kg (151 lb)     SpO2 100%   BMI 26.75 kg/m    GENERAL -Health appearing, No apparent distress  EYES- No scleral icterus, no eyelid droop, Pupils - see Neuro   section  HEENT - Normocephalic, atraumatic, Hearing grossly intact; Oral   mucosa moist and pink in color. External Ears and nose intact.   Neck - supple with no obvious lymphadenopathy or thyromegaly  PULM - Good spontaneous respiratory effort; Normal palpation of   chest.   CV- Pedal pulses present with no peripheral edema/ No significant   varicosities.  MSK- Gait - see Neuro section; Strength and tone- see Neuro   section; Range of motion grossly intact.  PSYCH -slightly agitated and uncooperative.    Neurological  Mental status - Patient is awake but initially does not want to   answer the orientation questions.  Attention is slightly   decreased.  Follows some simple commands appropriately.  Language   is limited to a few phrases.  Does speak English but does not   speak fluently.  Cranial nerves - Pupils are reactive and symmetric; EOMI, VFIT,   NLF symmetric  Motor - There is no pronator drift. Motor exam shows 5/5 strength   in all extremities.  Tone - Tone is symmetric bilaterally in upper and lower   extremities.  Reflexes - Reflexes are absent  Sensation - Sensory exam is grossly intact to light touch, pain.  Coordination - Finger to nose and " heel to shin is without   dysmetria.  Gait and station --needs assistance to ambulate.  Formal gait   testing cannot be done due to safety concerns from ongoing   medical issues.       DIAGNOSTIC STUDIES     Pertinent Radiology   CT head                                                  IMPRESSION:  1.  No CT evidence for acute intracranial process.  2.  Stable chronic changes as above.    EEG  Impression: This is an abnormal EEG due to left temporal sharp   activity that suggest a low threshold for focal seizure.    Underlying background is normal and no electrographic seizures   were recorded.     Classification: Dysrhythmia grade III left temporal    Previous neurology notes reviewed.  Patient has been seen by Dr. Dumont in the past.  Has had encephalopathy related to UTI   December of last year.  Was not very responsive at that time.    Does have a history of seizures.  Is on Keppra.  Is also poorly   controlled diabetes.  B12 was normal at that point.  Is also had   a stroke in 2012    Recent Results (from the past 24 hour(s))   Comprehensive metabolic panel    Collection Time: 01/03/22 12:17 AM   Result Value Ref Range    Sodium 142 136 - 145 mmol/L    Potassium 5.0 3.5 - 5.0 mmol/L    Chloride 107 98 - 107 mmol/L    Carbon Dioxide (CO2) 23 22 - 31 mmol/L    Anion Gap 12 5 - 18 mmol/L    Urea Nitrogen 26 8 - 28 mg/dL    Creatinine 2.17 (H) 0.60 - 1.10 mg/dL    Calcium 10.2 8.5 - 10.5 mg/dL    Glucose 104 70 - 125 mg/dL    Alkaline Phosphatase 324 (H) 45 - 120 U/L    AST 77 (H) 0 - 40 U/L    ALT 29 0 - 45 U/L    Protein Total 6.8 6.0 - 8.0 g/dL    Albumin 2.8 (L) 3.5 - 5.0 g/dL    Bilirubin Total 1.7 (H) 0.0 - 1.0 mg/dL    GFR Estimate 24 (L) >60 mL/min/1.73m2   TSH with free T4 reflex    Collection Time: 01/03/22 12:17 AM   Result Value Ref Range    TSH 2.85 0.30 - 5.00 uIU/mL   CRP inflammation    Collection Time: 01/03/22 12:17 AM   Result Value Ref Range    CRP 1.3 (H) 0.0-<0.8 mg/dL   Asymptomatic COVID-19  Virus (Coronavirus) by PCR Nasopharyngeal    Collection Time: 01/03/22 12:17 AM    Specimen: Nasopharyngeal; Swab   Result Value Ref Range    SARS CoV2 PCR Negative Negative   CBC with platelets and differential    Collection Time: 01/03/22 12:17 AM   Result Value Ref Range    WBC Count 3.3 (L) 4.0 - 11.0 10e3/uL    RBC Count 3.81 3.80 - 5.20 10e6/uL    Hemoglobin 8.9 (L) 11.7 - 15.7 g/dL    Hematocrit 29.6 (L) 35.0 - 47.0 %    MCV 78 78 - 100 fL    MCH 23.4 (L) 26.5 - 33.0 pg    MCHC 30.1 (L) 31.5 - 36.5 g/dL    RDW 15.5 (H) 10.0 - 15.0 %    Platelet Count 82 (L) 150 - 450 10e3/uL    % Neutrophils 72 %    % Lymphocytes 17 %    % Monocytes 8 %    % Eosinophils 3 %    % Basophils 0 %    % Immature Granulocytes 0 %    NRBCs per 100 WBC 0 <1 /100    Absolute Neutrophils 2.4 1.6 - 8.3 10e3/uL    Absolute Lymphocytes 0.6 (L) 0.8 - 5.3 10e3/uL    Absolute Monocytes 0.3 0.0 - 1.3 10e3/uL    Absolute Eosinophils 0.1 0.0 - 0.7 10e3/uL    Absolute Basophils 0.0 0.0 - 0.2 10e3/uL    Absolute Immature Granulocytes 0.0 <=0.4 10e3/uL    Absolute NRBCs 0.0 10e3/uL   Keppra (Levetiracetam) Level    Collection Time: 01/03/22 12:17 AM   Result Value Ref Range    Levetiracetam 74.0 (H) 6.0 - 46.0 ug/mL   Procalcitonin    Collection Time: 01/03/22 12:17 AM   Result Value Ref Range    Procalcitonin 0.13 0.00 - 0.49 ng/mL   Ammonia (on ice)    Collection Time: 01/03/22 12:54 AM   Result Value Ref Range    Ammonia 43 (H) 11 - 35 umol/L   UA with Microscopic reflex to Culture    Collection Time: 01/03/22  1:04 AM    Specimen: Urine, Clean Catch   Result Value Ref Range    Color Urine Light Yellow Colorless, Straw, Light Yellow, Yellow    Appearance Urine Clear Clear    Glucose Urine Negative Negative mg/dL    Bilirubin Urine Negative Negative    Ketones Urine Negative Negative mg/dL    Specific Gravity Urine 1.010 1.001 - 1.030    Blood Urine 0.03 mg/dL (A) Negative    pH Urine 7.0 5.0 - 7.0    Protein Albumin Urine 50  (A) Negative mg/dL     Urobilinogen Urine <2.0 <2.0 mg/dL    Nitrite Urine Positive (A) Negative    Leukocyte Esterase Urine 25 Ariella/uL (A) Negative    Bacteria Urine Many (A) None Seen /HPF    Mucus Urine Present (A) None Seen /LPF    RBC Urine 4 (H) <=2 /HPF    WBC Urine 18 (H) <=5 /HPF    Squamous Epithelials Urine 1 <=1 /HPF    Transitional Epithelials Urine <1 <=1 /HPF    Hyaline Casts Urine 1 <=2 /LPF   Glucose by meter    Collection Time: 01/03/22  7:30 AM   Result Value Ref Range    GLUCOSE BY METER POCT 80 70 - 99 mg/dL   Glucose by meter    Collection Time: 01/03/22 11:31 AM   Result Value Ref Range    GLUCOSE BY METER POCT 119 (H) 70 - 99 mg/dL   Glucose by meter    Collection Time: 01/03/22  5:31 PM   Result Value Ref Range    GLUCOSE BY METER POCT 197 (H) 70 - 99 mg/dL       Total time spent for face to face visit, reviewing labs/imaging   studies, counseling and coordination of care was: Over 70 min   More than 50% of this time was spent on counseling and   coordination of care.    Getting history the patient trying to examine her.  She is   agitated.  Does not want to answer questions that I asked her.    Coordination of care with the primary team.  Reviewing chart.    Reviewing previous neurology notes.  Reviewing imaging studies.    Varun Rankin MD  Neurologist  [facility]  Tel:- [phone]                    CBC with platelets + differential     Status: Abnormal    Narrative    The following orders were created for panel order CBC with platelets + differential.  Procedure                               Abnormality         Status                     ---------                               -----------         ------                     CBC with platelets and d...[[id number]]  Abnormal            Final result                 Please view results for these tests on the individual orders.

## 2022-01-04 NOTE — TELEPHONE ENCOUNTER
This patient is currently inpatient- I did inform Dr. Simental here in the clinic and she is aware  Gertrude Patrick CMA on 1/4/2022 at 2:11 PM

## 2022-01-04 NOTE — PROGRESS NOTES
Spoke with yannick Bruner, as we were told pt. Was in room crying and worried about costs r/t to hospital stay. Informed her pt. Has [employer] and may possibly not be contracted with this hospital as of 1/1/22. Stated family should call [facility] to find out coverage.

## 2022-01-04 NOTE — PROGRESS NOTES
01/04/22 1330   Quick Adds   Type of Visit Initial PT Evaluation       Present no   Language Pt declined .   Living Environment   People in home child(asha), adult   Current Living Arrangements house   Home Accessibility stairs within home   Number of Stairs, Within Home, Primary 7  (split-level, 7 stairs up, 7 stairs down)   Stair Railings, Within Home, Primary railing on left side (ascending)   Transportation Anticipated family or friend will provide   Living Environment Comments Pt mentioned to PT she does not want to return to living with daughter, stating she and her daughter fight alot. Notified SW.   Self-Care   Equipment Currently Used at Home walker, rolling   Disability/Function   Walking or Climbing Stairs Difficulty yes   Walking or Climbing Stairs ambulation difficulty, requires equipment   Mobility Management uses FWW for ambulation   Dressing/Bathing Difficulty no   Toileting issues no   Doing Errands Independently Difficulty (such as shopping) yes   Errands Management Daughter assists.   Change in Functional Status Since Onset of Current Illness/Injury yes   General Information   Onset of Illness/Injury or Date of Surgery 01/03/22   Referring Physician Sydnie Rodríguez MD   Patient/Family Therapy Goals Statement (PT) To return home, does not want to live with daughter.   Pertinent History of Current Problem (include personal factors and/or comorbidities that impact the POC) Pt admitted with acute encephalopathy.   Existing Precautions/Restrictions fall   Cognition   Follows Commands (Cognition) follows one-step commands   Cognitive Status Comments Occasionally needs re-direction, very focused on removing IV at beginning of session.   Pain Assessment   Patient Currently in Pain No   Range of Motion (ROM)   ROM Quick Adds ROM WFL   Strength   Manual Muscle Testing Quick Adds Strength WFL   Transfers   Transfers sit-stand transfer   Impairments Contributing to  Impaired Transfers impaired balance   Sit-Stand Transfer   Sit-Stand [place] (Transfers) contact guard   Assistive Device (Sit-Stand Transfers) walker, front-wheeled   Gait/Stairs (Locomotion)   [place] Level (Gait) contact guard   Assistive Device (Gait) walker, front-wheeled   Distance in Feet (Required for LE Total Joints) 200'   Pattern (Gait) step-through   Deviations/Abnormal Patterns (Gait) alethea decreased;gait speed decreased  (veers toward L side)   Balance   Balance Comments Veers toward L side with distraction.   Clinical Impression   Criteria for Skilled Therapeutic Intervention yes, treatment indicated   PT Diagnosis (PT) impaired functional mobility   Influenced by the following impairments decreased strength, impaired balance   Functional limitations due to impairments difficulty with transfers, ambulation   Clinical Presentation Stable/Uncomplicated   Clinical Presentation Rationale patient presents as medically diagnosed   Clinical Decision Making (Complexity) low complexity   Therapy Frequency (PT) Daily   Predicted Duration of Therapy Intervention (days/wks) 7 days   Planned Therapy Interventions (PT) balance training;bed mobility training;gait training;home exercise program;neuromuscular re-education;patient/family education;strengthening;transfer training;stair training   Anticipated Equipment Needs at Discharge (PT) walker, rolling   Risk & Benefits of therapy have been explained evaluation/treatment results reviewed;participants voiced agreement with care plan;participants included;patient   PT Discharge Planning    PT Discharge Recommendation (DC Rec) home with assist;home with home care physical therapy   PT Rationale for DC Rec Patient moving well with stand-by to contact guard assist. Recommend patient have daily check-ins from family and home PT. Defer to OT for cognitive recommendationis.   Total Evaluation Time   Total Evaluation Time (Minutes) 10     Brandie Membreno,  PT  1/4/2022

## 2022-01-04 NOTE — TELEPHONE ENCOUNTER
[facility] Call Center    Phone Message    May a detailed message be left on voicemail: yes     Reason for Call: Other: Zoila at [facility] called to report that Pt refused the EEG ordered by Dr. Serina Simental.      Zoila requests call back to further discuss.    Action Taken: Message routed to:  Other: [facility]    Travel Screening: Not Applicable

## 2022-01-04 NOTE — PLAN OF CARE
Patient admitted to floor from ED for UTI and acute encephalopathy. Patient educated on POC. Patient agitated about hospitalization, frequent urination, not wanting to take lactulose d/t resulting diarrhea, etc. Patient was provided education on all cares;  used intermittently as patient prefers to communicate in English, despite Cambodian being her primary language and having a very thick accent. Patient up to bathroom throughout night SBA with walker. Patient verbalized many complaints. Patient refused purewick and also upset that she is getting up so frequently. Oriented, but forgetful, bed alarm in place - calls appropriately sometimes.    Problem: UTI (Urinary Tract Infection)  Goal: Improved Infection Symptoms  Outcome: Improving

## 2022-01-04 NOTE — PROGRESS NOTES
NEUROLOGY PROGRESS NOTE     ASSESSMENT & PLAN     Diagnosis: Encephalopathy, resolving     Encephalopathy  History of seizure disorder  UTI  Elevated ammonia     Head CT negative for acute structural lesions  Previous EEG shows left temporal sharp wave activity  Continue Keppra. Keppra level 42.6, therapeutic.  Electroencephalogram pending.  Ammonia elevated at 43.  Recent TSH/B12 normal.  No additional imaging as patient appears to be improving.  Suspect metabolic toxic encephalopathy.  Continue IV ceftriaxone for UTI.  PT/OT.  Seizure precautions  Neurology will follow along for updated data.     Neurology Discharge Planning:  TBD    Patient Active Problem List    Diagnosis Date Noted     Acute encephalopathy 01/03/2022     Priority: Medium     Urinary tract infection without hematuria, site unspecified 01/03/2022     Priority: Medium     Thrombocytopenia (H) 12/14/2021     Priority: Medium     Lactic acid acidosis 12/14/2021     Priority: Medium     Hypernatremia 12/14/2021     Priority: Medium     Metabolic encephalopathy 12/12/2021     Priority: Medium     Hospital-acquired pneumonia 12/11/2021     Priority: Medium     Cirrhosis of liver with ascites, unspecified hepatic cirrhosis type (H) 12/11/2021     Priority: Medium     Anemia in chronic kidney disease 03/23/2021     Priority: Medium     Diabetic nephropathy associated with type 2 diabetes mellitus (H) 08/03/2020     Priority: Medium     Hyperlipidemia LDL goal <70 06/26/2017     Priority: Medium     Acute decompensated heart failure (H) 11/15/2016     Priority: Medium     Dyslipidemia 11/15/2016     Priority: Medium     Gastroesophageal reflux disease without esophagitis 11/15/2016     Priority: Medium     Uncontrolled type 2 diabetes mellitus with complication (H) 11/15/2016     Priority: Medium     CKD (chronic kidney disease) stage 4, GFR 15-29 ml/min (H) 10/17/2016     Priority: Medium     Benign essential hypertension 12/22/2015     Priority: Medium      Choledocholithiasis with acute cholecystitis 03/04/2014     Priority: Medium     Pancreatitis due to biliary obstruction 03/04/2014     Priority: Medium     Pleural effusion, right 01/08/2013     Priority: Medium     Overview:   Large per US 3/4/2014        ARF (acute renal failure) (H) 01/02/2013     Priority: Medium     Hyponatremia 01/02/2013     Priority: Medium     Seizure disorder (H) 11/01/2012     Priority: Medium     Hepatic cirrhosis due to chronic hepatitis C infection (H) 03/27/2012     Priority: Medium     Overview:   Portal hypertension  Splenomegaly size 18.5 cm with hypersplenism  Abdominal varices  Recurrent hepatic hydrothorax requiring thoracentesis   Hepatic encephalopathy  History of ascites, none this hospitalization       Anxiety 06/22/2011     Priority: Medium     Medical History  Past Medical History:   Diagnosis Date     Acute kidney failure (H)      Anxiety      Cerebral infarction (H)      Chronic kidney disease     stage III; with anemia     Diabetes (H)     Type II with neuropathy     Encephalopathy      Gastroesophageal reflux disease      Heart failure (H)     unspecified     Hydronephrosis      Hyperkalemia      Hypertension      Insomnia      Malnutrition (H)      Pleural effusion      Portal hypertension (H)      Seizures (H)      Transient cerebral ischemic attack         SUBJECTIVE     Leon presented to the emergency room with confusion.  Similar presentation last month.  Was found to have UTI at that time, abnormal EEG.    Leon tells me that she is feeling better today.  She is not confused.  She recalls meeting Dr. Rankin and being asked about whether there was snow outside.  She says he is not crazy.     OBJECTIVE     Vital signs in last 24 hours  Temp:  [98.2  F (36.8  C)-98.7  F (37.1  C)] 98.2  F (36.8  C)  Pulse:  [63-79] 71  Resp:  [18-22] 20  BP: (125-164)/() 141/71  SpO2:  [97 %-100 %] 97 %    Weight:   [unfilled]    Review of Systems   Pertinent items are  noted in HPI.    Neurological  Mental status -alert, attentive.  Oriented x3, but has trouble with the name of the hospital. Follows  simple commands appropriately.  Language is limited to a few phrases.  Does speak English but does not speak fluently.  Cranial nerves - Pupils are reactive and symmetric; EOMI, VFIT, NLF symmetric  Motor - There is no pronator drift. Motor exam shows 5/5 strength in all extremities.  Tone - Tone is symmetric bilaterally in upper and lower extremities.  Reflexes - Reflexes are trace at patellae.  Sensation - Sensory exam is grossly intact to light touch, pain.  Coordination - Finger to nose and heel to shin is without dysmetria.  Gait and station - needs assistance to ambulate.  Formal gait testing cannot be done due to safety concerns from ongoing medical issues.     DIAGNOSTIC STUDIES     Pertinent Radiology   Radiology Results: Personally reviewed image/s    CT Head                                                  IMPRESSION:  1.  No CT evidence for acute intracranial process.  2.  Stable chronic changes as above.     Electroencephalogram (12.13.21):  Impression: This is an abnormal EEG due to left temporal sharp activity that suggest a low threshold for focal seizure.  Underlying background is normal and no electrographic seizures were recorded.     Classification: Dysrhythmia grade III left temporal    Pertinent Labs   Lab Results: personally reviewed.   Recent Results (from the past 24 hour(s))   Glucose by meter    Collection Time: 01/03/22  7:30 AM   Result Value Ref Range    GLUCOSE BY METER POCT 80 70 - 99 mg/dL   Glucose by meter    Collection Time: 01/03/22 11:31 AM   Result Value Ref Range    GLUCOSE BY METER POCT 119 (H) 70 - 99 mg/dL   Glucose by meter    Collection Time: 01/03/22  5:31 PM   Result Value Ref Range    GLUCOSE BY METER POCT 197 (H) 70 - 99 mg/dL   Glucose by meter    Collection Time: 01/03/22  9:12 PM   Result Value Ref Range    GLUCOSE BY METER POCT 229  (H) 70 - 99 mg/dL         HOSPITAL MEDICATIONS       amLODIPine  5 mg Oral Daily     cefTRIAXone  1 g Intravenous Q24H     clopidogrel  75 mg Oral Daily     famotidine  20 mg Oral BID     insulin aspart  1-7 Units Subcutaneous TID AC     insulin aspart  1-5 Units Subcutaneous At Bedtime     lactulose  15 mL Oral BID     levETIRAcetam  500 mg Oral BID     metoprolol tartrate  25 mg Oral BID     rosuvastatin  20 mg Oral Daily     sodium chloride (PF)  3 mL Intracatheter Q8H        Total time spent for face to face visit, reviewing labs/imaging studies, counseling and coordination of care was: 30 Minutes. More than 50% of this time was spent on counseling and coordination of care.    Dragon software used in the dictation on this note.    Savanna Simental MD  [facility] - [address], [address] [place], [place] [id number]

## 2022-01-05 ENCOUNTER — APPOINTMENT (OUTPATIENT)
Dept: OCCUPATIONAL THERAPY | Facility: HOSPITAL | Age: 72
DRG: 071 | End: 2022-01-05
Attending: INTERNAL MEDICINE
Payer: COMMERCIAL

## 2022-01-05 ENCOUNTER — APPOINTMENT (OUTPATIENT)
Dept: PHYSICAL THERAPY | Facility: HOSPITAL | Age: 72
DRG: 071 | End: 2022-01-05
Payer: COMMERCIAL

## 2022-01-05 LAB
ALBUMIN SERPL-MCNC: 2.3 G/DL (ref 3.5–5)
ALP SERPL-CCNC: 280 U/L (ref 45–120)
ALT SERPL W P-5'-P-CCNC: 26 U/L (ref 0–45)
ANION GAP SERPL CALCULATED.3IONS-SCNC: 6 MMOL/L (ref 5–18)
AST SERPL W P-5'-P-CCNC: 55 U/L (ref 0–40)
BACTERIA UR CULT: ABNORMAL
BILIRUB SERPL-MCNC: 0.9 MG/DL (ref 0–1)
BUN SERPL-MCNC: 20 MG/DL (ref 8–28)
CALCIUM SERPL-MCNC: 8.9 MG/DL (ref 8.5–10.5)
CHLORIDE BLD-SCNC: 114 MMOL/L (ref 98–107)
CO2 SERPL-SCNC: 21 MMOL/L (ref 22–31)
CREAT SERPL-MCNC: 1.32 MG/DL (ref 0.6–1.1)
ERYTHROCYTE [DISTWIDTH] IN BLOOD BY AUTOMATED COUNT: 15.2 % (ref 10–15)
GFR SERPL CREATININE-BSD FRML MDRD: 43 ML/MIN/1.73M2
GLUCOSE BLD-MCNC: 139 MG/DL (ref 70–125)
GLUCOSE BLDC GLUCOMTR-MCNC: 137 MG/DL (ref 70–99)
GLUCOSE BLDC GLUCOMTR-MCNC: 154 MG/DL (ref 70–99)
GLUCOSE BLDC GLUCOMTR-MCNC: 155 MG/DL (ref 70–99)
GLUCOSE BLDC GLUCOMTR-MCNC: 201 MG/DL (ref 70–99)
HCT VFR BLD AUTO: 25.2 % (ref 35–47)
HGB BLD-MCNC: 7.9 G/DL (ref 11.7–15.7)
MCH RBC QN AUTO: 24.1 PG (ref 26.5–33)
MCHC RBC AUTO-ENTMCNC: 31.3 G/DL (ref 31.5–36.5)
MCV RBC AUTO: 77 FL (ref 78–100)
PLATELET # BLD AUTO: 65 10E3/UL (ref 150–450)
POTASSIUM BLD-SCNC: 3.6 MMOL/L (ref 3.5–5)
PROT SERPL-MCNC: 5.4 G/DL (ref 6–8)
RBC # BLD AUTO: 3.28 10E6/UL (ref 3.8–5.2)
SODIUM SERPL-SCNC: 141 MMOL/L (ref 136–145)
WBC # BLD AUTO: 1.8 10E3/UL (ref 4–11)

## 2022-01-05 PROCEDURE — 250N000013 HC RX MED GY IP 250 OP 250 PS 637: Performed by: HOSPITALIST

## 2022-01-05 PROCEDURE — 250N000013 HC RX MED GY IP 250 OP 250 PS 637: Performed by: STUDENT IN AN ORGANIZED HEALTH CARE EDUCATION/TRAINING PROGRAM

## 2022-01-05 PROCEDURE — 250N000013 HC RX MED GY IP 250 OP 250 PS 637: Performed by: INTERNAL MEDICINE

## 2022-01-05 PROCEDURE — 97165 OT EVAL LOW COMPLEX 30 MIN: CPT | Mod: GO

## 2022-01-05 PROCEDURE — 99231 SBSQ HOSP IP/OBS SF/LOW 25: CPT | Performed by: INTERNAL MEDICINE

## 2022-01-05 PROCEDURE — 97110 THERAPEUTIC EXERCISES: CPT | Mod: GP

## 2022-01-05 PROCEDURE — 250N000011 HC RX IP 250 OP 636: Performed by: HOSPITALIST

## 2022-01-05 PROCEDURE — 97535 SELF CARE MNGMENT TRAINING: CPT | Mod: GO

## 2022-01-05 PROCEDURE — 82374 ASSAY BLOOD CARBON DIOXIDE: CPT | Performed by: HOSPITALIST

## 2022-01-05 PROCEDURE — 120N000001 HC R&B MED SURG/OB

## 2022-01-05 PROCEDURE — 97116 GAIT TRAINING THERAPY: CPT | Mod: GP

## 2022-01-05 PROCEDURE — 85027 COMPLETE CBC AUTOMATED: CPT | Performed by: HOSPITALIST

## 2022-01-05 PROCEDURE — 36415 COLL VENOUS BLD VENIPUNCTURE: CPT | Performed by: HOSPITALIST

## 2022-01-05 RX ORDER — LANOLIN ALCOHOL/MO/W.PET/CERES
3 CREAM (GRAM) TOPICAL
Status: DISCONTINUED | OUTPATIENT
Start: 2022-01-05 | End: 2022-01-06 | Stop reason: HOSPADM

## 2022-01-05 RX ORDER — LORAZEPAM 0.5 MG/1
0.25 TABLET ORAL EVERY 6 HOURS PRN
Status: DISCONTINUED | OUTPATIENT
Start: 2022-01-05 | End: 2022-01-06 | Stop reason: HOSPADM

## 2022-01-05 RX ORDER — LANOLIN ALCOHOL/MO/W.PET/CERES
3 CREAM (GRAM) TOPICAL ONCE
Status: COMPLETED | OUTPATIENT
Start: 2022-01-05 | End: 2022-01-05

## 2022-01-05 RX ADMIN — CEFTRIAXONE SODIUM 1 G: 1 INJECTION, POWDER, FOR SOLUTION INTRAMUSCULAR; INTRAVENOUS at 21:23

## 2022-01-05 RX ADMIN — LEVETIRACETAM 500 MG: 500 TABLET, FILM COATED ORAL at 20:09

## 2022-01-05 RX ADMIN — CEFTRIAXONE SODIUM 1 G: 1 INJECTION, POWDER, FOR SOLUTION INTRAMUSCULAR; INTRAVENOUS at 00:34

## 2022-01-05 RX ADMIN — LACTULOSE 15 ML: 10 SOLUTION ORAL at 08:29

## 2022-01-05 RX ADMIN — LEVETIRACETAM 500 MG: 500 TABLET, FILM COATED ORAL at 08:28

## 2022-01-05 RX ADMIN — ROSUVASTATIN CALCIUM 20 MG: 10 TABLET, FILM COATED ORAL at 08:28

## 2022-01-05 RX ADMIN — METOPROLOL TARTRATE 25 MG: 25 TABLET, FILM COATED ORAL at 08:28

## 2022-01-05 RX ADMIN — AMLODIPINE BESYLATE 5 MG: 5 TABLET ORAL at 08:28

## 2022-01-05 RX ADMIN — INSULIN ASPART 1 UNITS: 100 INJECTION, SOLUTION INTRAVENOUS; SUBCUTANEOUS at 17:38

## 2022-01-05 RX ADMIN — CLOPIDOGREL BISULFATE 75 MG: 75 TABLET ORAL at 08:29

## 2022-01-05 RX ADMIN — INSULIN ASPART 1 UNITS: 100 INJECTION, SOLUTION INTRAVENOUS; SUBCUTANEOUS at 12:03

## 2022-01-05 RX ADMIN — INSULIN ASPART 1 UNITS: 100 INJECTION, SOLUTION INTRAVENOUS; SUBCUTANEOUS at 21:23

## 2022-01-05 RX ADMIN — MELATONIN TAB 3 MG 3 MG: 3 TAB at 00:34

## 2022-01-05 RX ADMIN — LACTULOSE 15 ML: 10 SOLUTION ORAL at 20:09

## 2022-01-05 RX ADMIN — METOPROLOL TARTRATE 25 MG: 25 TABLET, FILM COATED ORAL at 20:09

## 2022-01-05 RX ADMIN — LORAZEPAM 0.25 MG: 0.5 TABLET ORAL at 22:10

## 2022-01-05 RX ADMIN — LORAZEPAM 0.25 MG: 0.5 TABLET ORAL at 14:54

## 2022-01-05 RX ADMIN — FAMOTIDINE 20 MG: 20 TABLET, FILM COATED ORAL at 20:08

## 2022-01-05 ASSESSMENT — ACTIVITIES OF DAILY LIVING (ADL)
ADLS_ACUITY_SCORE: 14
PREVIOUS_RESPONSIBILITIES: MEAL PREP;HOUSEKEEPING;LAUNDRY;SHOPPING;MEDICATION MANAGEMENT
ADLS_ACUITY_SCORE: 14

## 2022-01-05 NOTE — PROGRESS NOTES
[facility]    Medicine Progress Note - Hospitalist Service       Date of Admission:  1/2/2022    Assessment & Plan           Leon Sweeney is a 71 year old female admitted on 1/2/2022. She was brought to the emergency department for evaluation of altered mental status, concern for UTI.     1.  Acute metabolic encephalopathy  -CT head without evidence for acute intracranial process  -Likely in the setting of UTI  -Ammonia 43, daughter reports not taking lactulose in a while  -Supportive management  -seems improved each day  -get PT/OT eval     2.  Acute cystitis without hematuria  -Continue IV ceftriaxone  -Follow-up urine culture-->  Culture in progress       >100,000 CFU/mL Lactose fermenting gram negative bacilli Abnormal             Resulting Agency: [facility]          3.  Acute kidney injury on chronic kidney disease stage IV  -Received 1 L normal saline bolus in the ED, hold off further IV fluids  -Hold diuretics for now  -Avoid nephrotoxins  -treat UTI  -creat today improved at 1.32     4.  Cirrhosis, chronic hepatitis C  -No signs of ascites but chronic edema with stasis dermatitis  -Holding diuretics secondary to MARIA DE JESUS, monitor volume status  -Resume lactulose     5.  Pancytopenia-ongoing  -Secondary to liver disease likely     6.  Abnormal chest x-ray  -Platelike infiltrate in the right base  -Recently treated for pneumonia  -SARS-CoV-2 PCR negative  -Procalcitonin not elevated  -check ct chest--no contrast to check infiltrate--not much change     7.  Seizures disorder  - Keppra level--ok    8.overweight-BMI 26     9.DVT prevent- scd, hold on hep /lovenox with thrombocytopenia               Diet: Combination Diet Moderate Consistent Carb (60 g CHO per Meal) Diet; Low Saturated Fat Na <2400mg Diet    DVT Prophylaxis: Pneumatic Compression Devices  Guerra Catheter: Not present  Central Lines: None  Code Status: Full Code      Disposition Plan   Here for UC  I suspect here til tomorrow       The  patient's care was discussed with the Care Coordinator/ and Patient. I called daughter to update and she agreed to low dose ativan    Sydnie Rodríguez MD  Hospitalist Service  [facility]  Securely message with the Vocera Web Console (learn more here)  Text page via [employer] Paging/Directory          ______________________________________________________________________    Interval History   She feels better  Still some urine frequency  Wants to go home  Eating ok      Data reviewed today: I reviewed all medications, new labs and imaging results over the last 24 hours. I personally reviewed no images or EKG's today.    Physical Exam   Vital Signs: Temp: 97.8  F (36.6  C) Temp src: Oral BP: (!) 143/68 Pulse: 69   Resp: 20 SpO2: 98 % O2 Device: None (Room air)    Weight: 151 lbs 0 oz  Constitutional: awake, alert, cooperative, no apparent distress, and appears stated age  Eyes: sclera clear  Respiratory: No increased work of breathing, good air exchange, clear to auscultation bilaterally, no crackles or wheezing  Cardiovascular: Normal apical impulse, regular rate and rhythm, normal S1 and S2, no S3 or S4, and no murmur noted  GI: normal bowel sounds, soft, non-distended and non-tender  Skin: no bruising or bleeding  Musculoskeletal: no lower extremity pitting edema present  Neurologic: Mental Status Exam:  Level of Alertness:   awake  Neuropsychiatric: General: normal, calm and normal eye contact    Data   Recent Labs   Lab 01/05/22  0755 01/05/22  0654 01/04/22 2054 01/04/22  0805 01/04/22  0602 01/03/22  0730 01/03/22  0017   WBC  --  1.8*  --   --  2.5*  --  3.3*   HGB  --  7.9*  --   --  8.7*  --  8.9*   MCV  --  77*  --   --  78  --  78   PLT  --  65*  --   --  75*  --  82*   NA  --  141  --   --  144  --  142   POTASSIUM  --  3.6  --   --  3.7  --  5.0   CHLORIDE  --  114*  --   --  115*  --  107   CO2  --  21*  --   --  21*  --  23   BUN  --  20  --   --  23  --  26   CR   --  1.32*  --   --  1.79*  --  2.17*   ANIONGAP  --  6  --   --  8  --  12   VI  --  8.9  --   --  9.2  --  10.2   * 139* 264*   < > 105   < > 104   ALBUMIN  --  2.3*  --   --  2.5*  --  2.8*   PROTTOTAL  --  5.4*  --   --  5.9*  --  6.8   BILITOTAL  --  0.9  --   --  1.1*  --  1.7*   ALKPHOS  --  280*  --   --  274*  --  324*   ALT  --  26  --   --  24  --  29   AST  --  55*  --   --  57*  --  77*    < > = values in this interval not displayed.     Recent Results (from the past 24 hour(s))   CT Chest w/o Contrast    Narrative    EXAM: CT CHEST W/O CONTRAST  LOCATION: [facility]  DATE/TIME: 1/4/2022 12:10 PM    INDICATION: Pneumonia, effusion or abscess suspected  COMPARISON: Portable chest radiographs 01/03/2022; CT of the chest, abdomen, and pelvis 12/11/2021  TECHNIQUE: CT chest without IV contrast. Multiplanar reformats were obtained. Dose reduction techniques were used.  CONTRAST: None.    FINDINGS:   LUNGS AND PLEURA: Small right pleural effusion is present which layers posteriorly. Related passive atelectasis of the right lung with focal band of opacity in the right posterior costophrenic sulcus. There is parenchymal mosaicism in the upper lobes   likely due to imaging at incomplete inspiration. No focal airspace opacities. Trachea and central airways are patent.    MEDIASTINUM: Cardiac chambers are normal in size. No pericardial effusion. Esophagus is decompressed. No discrete enlarged hilar or mediastinal lymph nodes. There are granulomatous calcifications in subaortic and left hilar nodes, likely sequela of   remote prior granulomatous infection.    CORONARY ARTERY CALCIFICATION: Mild, three-vessel.    UPPER ABDOMEN: Small volume, diffusely nodular liver consistent with cirrhosis. Mild perihepatic ascites and splenomegaly. There are splenorenal varices in the left upper quadrant. High attenuation at the cortical medullary junction of both kidneys.    MUSCULOSKELETAL: As T12  superior endplate deformity is unchanged. Generalized demineralization of the bones. No acute displaced fractures are identified.      Impression    IMPRESSION:     1.  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   2.  No new/acute lung or pleural space process.  3.  Cirrhosis with portal hypertension, left upper quadrant varices, splenomegaly, and mild perihepatic ascites.

## 2022-01-05 NOTE — PLAN OF CARE
Patient quite restless and anxious, calling nursing staff every 5-10min. Patient is crying, upset, has many things worrying her-can't sleep here, family is upsetting her, needs to pay bills, etc. Nurse listened and provided healing presence, made list of things that are worrying her. Dr Rodríguez gave prn order for small dose of ativan and also increased HS melatonin order to 3mg tonight.  Denies pain.  Ate well for both meals.  IV Sl'd  On sliding scale and CC insuline.  and 155 today.  Voiding well. Next ceftriaxone dose due at 2200.  PT/OT  Up with SBA to BR and ambulated in the halls multiple times.

## 2022-01-05 NOTE — PLAN OF CARE
Occupational Therapy Discharge Summary    Reason for therapy discharge:    All goals and outcomes met, no further needs identified.    Progress towards therapy goal(s). See goals on Care Plan in [facility] electronic health record for goal details.  Goals met    Therapy recommendation(s):    No further therapy is recommended.recommend daily check by family when pt return home .

## 2022-01-05 NOTE — PLAN OF CARE
"  Problem: Adult Inpatient Plan of Care  Goal: Optimal Comfort and Wellbeing  Outcome: Improving     Problem: Adult Inpatient Plan of Care  Goal: Patient-Specific Goal (Individualized)  Outcome: Improving     Notified Dr. Melida PATEL, about pts continued insomnia overnight. Additional 1x melatonin ordered, mildly effective. PRN tylenol given x1 for \"feeling uncomfortable\". Denied pain. Tolerating IV abx. Called out frequently for repeated requests. Demanding towards staff at times.   "

## 2022-01-05 NOTE — PROGRESS NOTES
"Spiritual Assessment:     Lonely;  for 9 years    Feeling sad; reflecting on multiple losses throughout her life  Experiencing difficult/challenging family dynamics  Identifies blended Spiritual beliefs (Shinto and Amish philosophies)  Finds comfort in praying to loved ones    Care Provided:   Empathic listening and presence    Helped patient in processing of emotions  Normalized/validated her feelings of sadness, disappointment, heartache and loss    Prayer shared    Full Spiritual Care Note: Met with Leon due to staff referral. She engaged easily in conversation, stating that she wants to go home. She is not clear on why she is still here and expresses concerns about the cost of staying here each day. Leon lives alone. She says that she is able to do her own cooking and cleaning, but has limited transportation because one of her daughters has her car much of the time. Leon shared some heartache about her relationships with her kids and their decisions about life partners. She feels her children do not respect her or value her opinions. She reflects on how different her relationship with her own parents was and expresses sadness around this. Leon came to the [place] from [place] in . She shares that her lost her mother at age 17 and had to care for her younger brothers and sisters. Since she came to the , she says all of her siblings in [place] have . Leaving her home country was painful. She says \"What can I do? I try to leave the bad behind me, but it makes me sad when I think of my country and all the people that have .\" Leon shares that Mati, her  of 48 years,  about 9 years ago. This also has been difficult. She talked about what a good  he was. She says her  has one cousin in [place] whom she goes to visit from time to time. This is a source of comfort. She also finds comfort in praying to her loved ones. Leon states she is Shinto and attends two " [place] when she can - one in [place], [place] and the other in [place], [place]. She welcomes my offer to notify the [facility] in [place] of her admission.     Leon says that she has not been sleeping well at night, due to the unfamiliar environment and her vital signs being taken regularly. Brought her flavio oil aromatherapy, and pictures to color. Put on the Care Channel for her. Prayer shared. Notified Leon Galeas's nurse, of her concerns.     Visit Length: 45 minutes    Plan of Care: Will remain available for further support as patient/family needs/desires.    Andreea Posey M.Div.  Staff   [phone]

## 2022-01-05 NOTE — PROGRESS NOTES
01/05/22 0930   Quick Adds   Type of Visit Initial Occupational Therapy Evaluation   Living Environment   People in home alone   Current Living Arrangements house   Self-Care   Usual Activity Tolerance good   Current Activity Tolerance good   Instrumental Activities of Daily Living (IADL)   Previous Responsibilities meal prep;housekeeping;laundry;shopping;medication management   Disability/Function   Hearing Difficulty or Deaf no   General Information   Onset of Illness/Injury or Date of Surgery 01/02/22   Referring Physician briseyda pelletier   Patient/Family Therapy Goal Statement (OT) return home   Range of Motion Comprehensive   General Range of Motion no range of motion deficits identified   Bed Mobility   Bed Mobility supine-sit;sit-supine   Supine-Sit [place] (Bed Mobility) independent   Sit-Supine [place] (Bed Mobility) modified independence   Transfers   Transfers bed-chair transfer;sit-stand transfer   Clinical Impression   Criteria for Skilled Therapeutic Interventions Met (OT) no   Therapy Frequency (OT) 1x eval and treat   OT Discharge Planning    OT Discharge Recommendation (DC Rec) Home with assist   OT Rationale for DC Rec pt independent at home   Total Evaluation Time (Minutes)   Total Evaluation Time (Minutes) 10

## 2022-01-05 NOTE — PROGRESS NOTES
Pt confused at times and very forgetful which made redirecting her difficult.     She demanded to be taken for a walk immediately. When writer explained to her that someone will as soon as able and escorted pt back to her bed, pt would again demand to be taken for a walk now. She did this for several hours.    It was apparent that she was either forgetting previous conversations or not happy with the answer she received.

## 2022-01-05 NOTE — PROGRESS NOTES
Brief neurology note:    Patient refused EEG yesterday.  Continue with current Keppra treatment. Follow-up with neurology as an outpatient.    Neurology will sign off at this time.  Please let us know if there are questions.    Savanna Simental MD

## 2022-01-06 VITALS
SYSTOLIC BLOOD PRESSURE: 121 MMHG | BODY MASS INDEX: 26.75 KG/M2 | HEART RATE: 71 BPM | OXYGEN SATURATION: 97 % | WEIGHT: 151 LBS | RESPIRATION RATE: 15 BRPM | HEIGHT: 63 IN | DIASTOLIC BLOOD PRESSURE: 69 MMHG | TEMPERATURE: 98 F

## 2022-01-06 LAB
ALBUMIN SERPL-MCNC: 2.1 G/DL (ref 3.5–5)
ALP SERPL-CCNC: 223 U/L (ref 45–120)
ALT SERPL W P-5'-P-CCNC: 25 U/L (ref 0–45)
ANION GAP SERPL CALCULATED.3IONS-SCNC: 6 MMOL/L (ref 5–18)
AST SERPL W P-5'-P-CCNC: 50 U/L (ref 0–40)
BILIRUB SERPL-MCNC: 0.7 MG/DL (ref 0–1)
BUN SERPL-MCNC: 22 MG/DL (ref 8–28)
CALCIUM SERPL-MCNC: 8.6 MG/DL (ref 8.5–10.5)
CHLORIDE BLD-SCNC: 114 MMOL/L (ref 98–107)
CO2 SERPL-SCNC: 21 MMOL/L (ref 22–31)
CREAT SERPL-MCNC: 1.58 MG/DL (ref 0.6–1.1)
ERYTHROCYTE [DISTWIDTH] IN BLOOD BY AUTOMATED COUNT: 15.4 % (ref 10–15)
GFR SERPL CREATININE-BSD FRML MDRD: 34 ML/MIN/1.73M2
GLUCOSE BLD-MCNC: 118 MG/DL (ref 70–125)
GLUCOSE BLDC GLUCOMTR-MCNC: 110 MG/DL (ref 70–99)
GLUCOSE BLDC GLUCOMTR-MCNC: 164 MG/DL (ref 70–99)
HCT VFR BLD AUTO: 24.6 % (ref 35–47)
HGB BLD-MCNC: 7.7 G/DL (ref 11.7–15.7)
MCH RBC QN AUTO: 24.3 PG (ref 26.5–33)
MCHC RBC AUTO-ENTMCNC: 31.3 G/DL (ref 31.5–36.5)
MCV RBC AUTO: 78 FL (ref 78–100)
PLATELET # BLD AUTO: 59 10E3/UL (ref 150–450)
POTASSIUM BLD-SCNC: 3.8 MMOL/L (ref 3.5–5)
PROT SERPL-MCNC: 4.8 G/DL (ref 6–8)
RBC # BLD AUTO: 3.17 10E6/UL (ref 3.8–5.2)
SODIUM SERPL-SCNC: 141 MMOL/L (ref 136–145)
WBC # BLD AUTO: 1.8 10E3/UL (ref 4–11)

## 2022-01-06 PROCEDURE — 36415 COLL VENOUS BLD VENIPUNCTURE: CPT | Performed by: HOSPITALIST

## 2022-01-06 PROCEDURE — 250N000011 HC RX IP 250 OP 636: Performed by: INTERNAL MEDICINE

## 2022-01-06 PROCEDURE — 250N000013 HC RX MED GY IP 250 OP 250 PS 637: Performed by: HOSPITALIST

## 2022-01-06 PROCEDURE — 82040 ASSAY OF SERUM ALBUMIN: CPT | Performed by: HOSPITALIST

## 2022-01-06 PROCEDURE — 99239 HOSP IP/OBS DSCHRG MGMT >30: CPT | Performed by: INTERNAL MEDICINE

## 2022-01-06 PROCEDURE — 85014 HEMATOCRIT: CPT | Performed by: HOSPITALIST

## 2022-01-06 PROCEDURE — 80053 COMPREHEN METABOLIC PANEL: CPT | Performed by: HOSPITALIST

## 2022-01-06 RX ORDER — FAMOTIDINE 20 MG/1
20 TABLET, FILM COATED ORAL DAILY
Start: 2022-01-06

## 2022-01-06 RX ADMIN — LACTULOSE 15 ML: 10 SOLUTION ORAL at 09:04

## 2022-01-06 RX ADMIN — LEVETIRACETAM 500 MG: 500 TABLET, FILM COATED ORAL at 09:12

## 2022-01-06 RX ADMIN — AMLODIPINE BESYLATE 5 MG: 5 TABLET ORAL at 09:02

## 2022-01-06 RX ADMIN — CLOPIDOGREL BISULFATE 75 MG: 75 TABLET ORAL at 09:01

## 2022-01-06 RX ADMIN — CEFTRIAXONE SODIUM 1 G: 1 INJECTION, POWDER, FOR SOLUTION INTRAMUSCULAR; INTRAVENOUS at 10:54

## 2022-01-06 RX ADMIN — ROSUVASTATIN CALCIUM 20 MG: 10 TABLET, FILM COATED ORAL at 09:02

## 2022-01-06 RX ADMIN — INSULIN ASPART 1 UNITS: 100 INJECTION, SOLUTION INTRAVENOUS; SUBCUTANEOUS at 12:50

## 2022-01-06 RX ADMIN — METOPROLOL TARTRATE 25 MG: 25 TABLET, FILM COATED ORAL at 09:01

## 2022-01-06 ASSESSMENT — ACTIVITIES OF DAILY LIVING (ADL)
ADLS_ACUITY_SCORE: 14
ADLS_ACUITY_SCORE: 14
ADLS_ACUITY_SCORE: 16
ADLS_ACUITY_SCORE: 14
ADLS_ACUITY_SCORE: 16
ADLS_ACUITY_SCORE: 14
ADLS_ACUITY_SCORE: 16
ADLS_ACUITY_SCORE: 16
ADLS_ACUITY_SCORE: 14
ADLS_ACUITY_SCORE: 14
ADLS_ACUITY_SCORE: 16
ADLS_ACUITY_SCORE: 14
ADLS_ACUITY_SCORE: 16
ADLS_ACUITY_SCORE: 14

## 2022-01-06 NOTE — DISCHARGE SUMMARY
[facility] MEDICINE  DISCHARGE SUMMARY     Primary Care Physician: System, Provider Not In  Admission Date: 1/2/2022   Discharge Provider: Sydnie Rodríguez MD Discharge Date: 1/6/2022   Diet:   Active Diet and Nourishment Order   Procedures     Combination Diet Moderate Consistent Carb (60 g CHO per Meal) Diet; Low Saturated Fat Na <2400mg Diet     Diet       Code Status: Full Code   Activity: DCACTIVITY: Activity as tolerated        Condition at Discharge: Fair     REASON FOR PRESENTATION(See Admission Note for Details)   Confusion    PRINCIPAL & ACTIVE DISCHARGE DIAGNOSES     Principal Problem:    Acute encephalopathy  Active Problems:    CKD (chronic kidney disease) stage 4, GFR 15-29 ml/min (H)    Seizure disorder (H)    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            PROCEDURES ( this hospitalization only)      none    RECOMMENDATIONS TO OUTPATIENT PROVIDER FOR F/U VISIT     Follow-up Appointments     Follow-up and recommended labs and tests       Follow up with primary care provider, Provider Not In System, within   3-4days for hospital follow- up.  The following labs/tests are   recommended: CMP and CBC.               DISPOSITION     Home with home care    SUMMARY OF HOSPITAL COURSE:           Leon Sweeney is a 71 year old female admitted on 1/2/2022. She was brought to the emergency department for evaluation of altered mental status, concern for UTI.     1.  Acute metabolic encephalopathy  -CT head without evidence for acute intracranial process  -Likely in the setting of UTI  -Ammonia 43, daughter reports not taking lactulose in a while  -Supportive management  -seems improved each day  -get  PT/OT eval     2.  Acute cystitis without hematuria  -Continue IV ceftriaxone--one more dose today then done with antibiotics   -Follow-up urine culture-->     Culture >100,000 CFU/mL Klebsiella pneumoniae Abnormal               Resulting Agency: [facility]         Susceptibility              Klebsiella pneumoniae       REILLY       Ampicillin >=32.0 ug/mL Resistant 1       Ampicillin/ Sulbactam >=32.0 ug/mL Resistant       Cefazolin <=4.0 ug/mL Susceptible 2       Cefepime <=1.0 ug/mL Susceptible       Cefoxitin <=4.0 ug/mL Susceptible       Ceftazidime <=1.0 ug/mL Susceptible       Ceftriaxone <=1.0 ug/mL Susceptible       Ciprofloxacin <=0.25 ug/mL Susceptible       Gentamicin <=1.0 ug/mL Susceptible       Levofloxacin <=0.12 ug/mL Susceptible       Nitrofurantoin <=16.0 ug/mL Susceptible       Piperacillin/Tazobactam <=4.0 ug/mL Susceptible       Tobramycin <=1.0 ug/mL Susceptible       Trimethoprim/Sulfamethoxazole <=1/19 ug/mL Susceptible                                    3.  Acute kidney injury on chronic kidney disease stage IV  -Received 1 L normal saline bolus in the ED, hold off further IV fluids  -Held diuretics here--restart at home  -Avoid nephrotoxins  -treat UTI  -creat today improved at 1.5     4.  Cirrhosis, chronic hepatitis C  -No signs of ascites but chronic edema with stasis dermatitis  -restart diuretics now  -Resume lactulose     5.  Pancytopenia-ongoing  -Secondary to liver disease likely     6.  Abnormal chest x-ray  -Platelike infiltrate in the right base  -Recently treated for pneumonia  -SARS-CoV-2 PCR negative  -Procalcitonin not elevated  -check ct chest--no contrast to check infiltrate--not much change     7.  Seizures disorder  - Keppra level--ok     8.overweight-BMI 26     9.DM  -was on sliding scale and carb counting here,  Back on home plan at discharge with lantus and novolog     10.DVT prevent- scd, hold on hep /lovenox with thrombocytopenia           Discharge Medications with Med  changes:     Current Discharge Medication List      CONTINUE these medications which have NOT CHANGED    Details   acetaminophen (TYLENOL) 325 MG tablet Take 650 mg by mouth 3 times daily      albuterol (PROAIR HFA/PROVENTIL HFA/VENTOLIN HFA) 108 (90 Base) MCG/ACT inhaler Inhale 2 puffs into the lungs every 4 hours as needed for shortness of breath / dyspnea or wheezing  Qty: 18 g, Refills: 0    Comments: Pharmacy may dispense brand covered by insurance (Proair, or proventil or ventolin or generic albuterol inhaler)  Associated Diagnoses: Hospital-acquired pneumonia      amLODIPine (NORVASC) 5 MG tablet Take 5 mg by mouth daily      calcium carbonate (OS-VI) 1500 (600 Ca) MG tablet Take 600 mg by mouth 2 times daily (with meals)      clopidogrel (PLAVIX) 75 MG tablet Take 75 mg by mouth daily      famotidine (PEPCID) 20 MG tablet Take 20 mg by mouth 2 times daily      ferrous sulfate (FE TABS) 325 (65 Fe) MG EC tablet Take 325 mg by mouth daily      insulin aspart (NOVOLOG PEN) 100 UNIT/ML pen Give 8 units subcutaneous three times daily with meals. Hold for BS <90.     Sliding scale: 151-200 2 units  201--250: 4 units  251-300: 6 units  301-350: 8 units      insulin glargine (LANTUS PEN) 100 UNIT/ML pen Inject 18 Units Subcutaneous At Bedtime      lactulose (CHRONULAC) 10 GM/15ML solution Take 15 mLs by mouth daily      levETIRAcetam (KEPPRA) 500 MG tablet Take 500 mg by mouth 2 times daily      metoprolol tartrate (LOPRESSOR) 25 MG tablet Take 25 mg by mouth 2 times daily      multivitamin w/minerals (THERA-VIT-M) tablet Take 1 tablet by mouth daily      rosuvastatin (CRESTOR) 20 MG tablet Take 20 mg by mouth daily      spironolactone (ALDACTONE) 50 MG tablet Take 1 tablet by mouth twice a week      torsemide (DEMADEX) 20 MG tablet Take 20 mg by mouth daily      traZODone (DESYREL) 50 MG tablet Take 50 mg by mouth nightly as needed for sleep                   Rationale for medication changes:      S/p course here  of ceftriaxone--done  Restarting her diuretics at discharge  Back on home DM meds        Consults       CARE MANAGEMENT / SOCIAL WORK IP CONSULT  SOCIAL WORK IP CONSULT  NEUROLOGY IP CONSULT  PHYSICAL THERAPY ADULT IP CONSULT  OCCUPATIONAL THERAPY ADULT IP CONSULT    Immunizations given this encounter     Most Recent Immunizations   Administered Date(s) Administered     COVID-19,PF,Pfizer (12+ Yrs) 03/14/2021     Influenza Vaccine Im 4yrs+ 4 Valent CCIIV4 10/06/2021           Anticoagulation Information      Recent INR results: No results for input(s): INR in the last 168 hours.  Warfarin doses (if applicable) or name of other anticoagulant: none      SIGNIFICANT IMAGING FINDINGS     Results for orders placed or performed during the hospital encounter of 01/02/22   Head CT w/o contrast    Impression    IMPRESSION:  1.  No CT evidence for acute intracranial process.  2.  Stable chronic changes as above.   XR Chest Port 1 View    Impression    IMPRESSION: Cardiac enlargement. Small right pleural effusion. Platelike infiltrate in the right base.   CT Chest w/o Contrast    Impression    IMPRESSION:     1.  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   2.  No new/acute lung or pleural space process.  3.  Cirrhosis with portal hypertension, left upper quadrant varices, splenomegaly, and mild perihepatic ascites.         SIGNIFICANT LABORATORY FINDINGS     Most Recent 3 CBC's:Recent Labs   Lab Test 01/06/22  0606 01/05/22  0654 01/04/22  0602   WBC 1.8* 1.8* 2.5*   HGB 7.7* 7.9* 8.7*   MCV 78 77* 78   PLT 59* 65* 75*     Most Recent 3 BMP's:Recent Labs   Lab Test 01/06/22  0745 01/06/22  0606 01/05/22 2053 01/05/22  0755 01/05/22  0654 01/04/22  0805 01/04/22  0602   NA  --  141  --   --  141  --  144   POTASSIUM  --  3.8  --   --  3.6  --  3.7   CHLORIDE  --  114*  --   --  114*  --  115*   CO2  --  21*  --   --  21*  --  21*   BUN  --  22  --   --  20  --  23   CR  --  1.58*  --   --   1.32*  --  1.79*   ANIONGAP  --  6  --   --  6  --  8   VI  --  8.6  --   --  8.9  --  9.2   * 118 201*   < > 139*   < > 105    < > = values in this interval not displayed.     Most Recent 2 LFT's:Recent Labs   Lab Test 01/06/22  0606 01/05/22  0654   AST 50* 55*   ALT 25 26   ALKPHOS 223* 280*   BILITOTAL 0.7 0.9     Most Recent 3 INR's:Recent Labs   Lab Test 12/11/21  1833   INR 1.44*           Discharge Orders        Reason for your hospital stay    Confusion, uti     Follow-up and recommended labs and tests     Follow up with primary care provider, Provider Not In System, within 3-4days for hospital follow- up.  The following labs/tests are recommended: CMP and CBC.     Activity    Your activity upon discharge: activity as tolerated     MD face to face encounter    Documentation of Face to Face and Certification for Home Health Services    I certify that patient: Leon Sweeney is under my care and that I, or a nurse practitioner or physician's assistant working with me, had a face-to-face encounter that meets the physician face-to-face encounter requirements with this patient on: January 6, 2022.    This encounter with the patient was in whole, or in part, for the following medical condition, which is the primary reason for home health care: 70 y/o with liver dx, UTI.    I certify that, based on my findings, the following services are medically necessary home health services: Nursing, Occupational Therapy, Physical Therapy, and Aide.    My clinical findings support the need for the above services because: Nurse is needed: betoal how she is doing with meds, check CMP and CBC in 2 days and call MD to update., Occupational Therapy Services are needed to assess and treat cognitive ability and address ADL safety due to impairment in home eval., and Physical Therapy Services are needed to assess and treat the following functional impairments: home eval  Health aide assist too.    Further, I certify that my  clinical findings support that this patient is homebound (i.e. absences from home require considerable and taxing effort and are for medical reasons or [employer] services or infrequently or of short duration when for other reasons) because: needs assist in home and eval.    Based on the above findings. I certify that this patient is confined to the home and needs intermittent skilled nursing care, physical therapy and/or speech therapy.  The patient is under my care, and I have initiated the establishment of the plan of care.  This patient will be followed by a physician who will periodically review the plan of care.  Physician/Provider to provide follow up care: System, Provider Not In    Attending hospital physician (the Medicare certified [facility] provider): Sydnie Rodríguez MD  Physician Signature: See electronic signature associated with these discharge orders.  Date: 1/6/2022     Diet    Follow this diet upon discharge: Orders Placed This Encounter      Combination Diet Moderate Consistent Carb (60 g CHO per Meal) Diet; Low Saturated Fat Na <2400mg Diet       Examination   Physical Exam   Temp:  [98  F (36.7  C)-98.4  F (36.9  C)] 98  F (36.7  C)  Pulse:  [55-71] 71  Resp:  [15-20] 15  BP: (121-164)/(61-70) 121/69  SpO2:  [95 %-98 %] 97 %  Wt Readings from Last 1 Encounters:   01/02/22 68.5 kg (151 lb)       See today's note      Please see EMR for more detailed significant labs, imaging, consultant notes etc.    I, Sydnie Rodríguez MD, personally saw the patient today and spent greater than 30 minutes discharging this patient.    Sydnie Rodríguez MD  [facility]    CC:System, Provider Not In

## 2022-01-06 NOTE — PROGRESS NOTES
[facility]    Medicine Progress Note - Hospitalist Service       Date of Admission:  1/2/2022    Assessment & Plan             Leon Sweeney is a 71 year old female admitted on 1/2/2022. She was brought to the emergency department for evaluation of altered mental status, concern for UTI.     1.  Acute metabolic encephalopathy  -CT head without evidence for acute intracranial process  -Likely in the setting of UTI  -Ammonia 43, daughter reports not taking lactulose in a while  -Supportive management  -seems improved each day  -get PT/OT eval     2.  Acute cystitis without hematuria  -Continue IV ceftriaxone--one more dose today then done with antibiotics   -Follow-up urine culture-->    Culture >100,000 CFU/mL Klebsiella pneumoniae Abnormal             Resulting Agency: [facility]       Susceptibility     Klebsiella pneumoniae     REILLY     Ampicillin >=32.0 ug/mL Resistant 1     Ampicillin/ Sulbactam >=32.0 ug/mL Resistant     Cefazolin <=4.0 ug/mL Susceptible 2     Cefepime <=1.0 ug/mL Susceptible     Cefoxitin <=4.0 ug/mL Susceptible     Ceftazidime <=1.0 ug/mL Susceptible     Ceftriaxone <=1.0 ug/mL Susceptible     Ciprofloxacin <=0.25 ug/mL Susceptible     Gentamicin <=1.0 ug/mL Susceptible     Levofloxacin <=0.12 ug/mL Susceptible     Nitrofurantoin <=16.0 ug/mL Susceptible     Piperacillin/Tazobactam <=4.0 ug/mL Susceptible     Tobramycin <=1.0 ug/mL Susceptible     Trimethoprim/Sulfamethoxazole <=1/19 ug/mL Susceptible                            3.  Acute kidney injury on chronic kidney disease stage IV  -Received 1 L normal saline bolus in the ED, hold off further IV fluids  -Held diuretics here--restart at home  -Avoid nephrotoxins  -treat UTI  -creat today improved at 1.5     4.  Cirrhosis, chronic hepatitis C  -No signs of ascites but chronic edema with stasis dermatitis  -restart diuretics now  -Resume lactulose     5.  Pancytopenia-ongoing  -Secondary to liver disease likely     6.   Abnormal chest x-ray  -Platelike infiltrate in the right base  -Recently treated for pneumonia  -SARS-CoV-2 PCR negative  -Procalcitonin not elevated  -check ct chest--no contrast to check infiltrate--not much change     7.  Seizures disorder  - Keppra level--ok     8.overweight-BMI 26    9.DM  -was on sliding scale and carb counting here,  Back on home plan at discharge with lantus and novolog     10.DVT prevent- scd, hold on hep /lovenox with thrombocytopenia                Diet: Combination Diet Moderate Consistent Carb (60 g CHO per Meal) Diet; Low Saturated Fat Na <2400mg Diet  Diet    DVT Prophylaxis: Pneumatic Compression Devices  Guerra Catheter: Not present  Central Lines: None  Code Status: Full Code      Disposition Plan   Expected Discharge: 01/06/2022     Anticipated discharge location  Home after last dose antibiotics       The patient's care was discussed with the Bedside Nurse, Care Coordinator/ and Patient.    Sydnie Rodríguez MD  Hospitalist Service  [facility]  Securely message with the Vocera Web Console (learn more here)  Text page via [employer] Paging/Directory        ______________________________________________________________________    Interval History   She feels good  Slept well  Wants to go home  No pain   No issues with voiding    Data reviewed today: I reviewed all medications, new labs and imaging results over the last 24 hours. I personally reviewed no images or EKG's today.    Physical Exam   Vital Signs: Temp: 98  F (36.7  C) Temp src: Oral BP: 121/69 Pulse: 71   Resp: 15 SpO2: 97 % O2 Device: None (Room air)    Weight: 151 lbs 0 oz  Constitutional: awake, alert, cooperative, no apparent distress, and appears stated age  Respiratory: No increased work of breathing, good air exchange, clear to auscultation bilaterally, no crackles or wheezing  Cardiovascular: Normal apical impulse, regular rate and rhythm, normal S1 and S2, no S3 or S4, and no  murmur noted  GI: No scars, normal bowel sounds, soft, non-distended, non-tender, no masses palpated, no hepatosplenomegally  Skin: no bruising or bleeding  Musculoskeletal: no lower extremity pitting edema present  Neurologic: Mental Status Exam:  Level of Alertness:   awake  Neuropsychiatric: General: normal, calm and normal eye contact    Data   Recent Labs   Lab 01/06/22  0745 01/06/22  0606 01/05/22 2053 01/05/22  0755 01/05/22  0654 01/04/22  0805 01/04/22  0602   WBC  --  1.8*  --   --  1.8*  --  2.5*   HGB  --  7.7*  --   --  7.9*  --  8.7*   MCV  --  78  --   --  77*  --  78   PLT  --  59*  --   --  65*  --  75*   NA  --  141  --   --  141  --  144   POTASSIUM  --  3.8  --   --  3.6  --  3.7   CHLORIDE  --  114*  --   --  114*  --  115*   CO2  --  21*  --   --  21*  --  21*   BUN  --  22  --   --  20  --  23   CR  --  1.58*  --   --  1.32*  --  1.79*   ANIONGAP  --  6  --   --  6  --  8   VI  --  8.6  --   --  8.9  --  9.2   * 118 201*   < > 139*   < > 105   ALBUMIN  --  2.1*  --   --  2.3*  --  2.5*   PROTTOTAL  --  4.8*  --   --  5.4*  --  5.9*   BILITOTAL  --  0.7  --   --  0.9  --  1.1*   ALKPHOS  --  223*  --   --  280*  --  274*   ALT  --  25  --   --  26  --  24   AST  --  50*  --   --  55*  --  57*    < > = values in this interval not displayed.     No results found for this or any previous visit (from the past 24 hour(s)).

## 2022-01-06 NOTE — PLAN OF CARE
Problem: Adult Inpatient Plan of Care  Goal: Readiness for Transition of Care  Outcome: Adequate for Discharge   Patient medically stable for discharge per MD orders.  Patient anxious and refusing to discharge to dtr [facility]'s home.  Patient states she wants to go to her own home in [place].  Patient states she does not like living at [facility]'s house.  Patient will need transportation home from family.  SW involved and assisting with discharge.  Dr Rodríguez updated.

## 2022-01-06 NOTE — PLAN OF CARE
Care Management Discharge Note    Discharge Date: 01/06/2022       Discharge Disposition: Home Care- [facility] Home care    Discharge Services:  PT, HHA, RN    Discharge DME:      Discharge Transportation: family or friend will provide    Private pay costs discussed: Not applicable    Education Provided on the Discharge Plan:  yes  Persons Notified of Discharge Plans: bedside nurse, pt daughter, [facility] home care. Pt.  Patient/Family in Agreement with the Plan: yes    Handoff Referral Completed: Yes    Additional Information:  Per attending, pt medically ready to discharge, DIANNA contacted intake at [facility] and left message on voicemail pt would discharge today. Discharge orders sent to [facility]. DIANNA left message for bedside nurse with update. Family to transport at discharge    KATELIN Sam alerted by bedside nurse that pt is stating she will not go to [facility]. DIANNA met with pt to discuss discharge plan. Pt states she wants to go to her own home in [place]. Pt stated her daughter does not make good food, and pt wants to make her own food.  DIANNA contacted pt daughter Audrey, pt does still own her own home in [place], but pt is not safe to be there alone it has been determined in the past, per Audrey. Audrey states that pt does not like being told to eat a lower salt or healthier diet. Audrey stated family has been called by authorities because pt does not ayala well in her own home. DIANNA met with pt in pt room after discussion with audrey and bedside nurse. Pt was talking on the phone to her daughter in law Jessica. Jessica asked to speak to SW and pt handed phone to DIANNA. Jessica is pts daughter in law. Jessica stated that pt has been told many times that no other family members are able to have pt live with them, and pt option is to live with Audrey. She also reported that family and other staff have told pt she is not safe anymore to live alone. Pt became angry stating that she owns her home in [place] and she wants to  live there alone with no one telling her what to do. Jessica reported that pt also argues with home care staff when they come in to work with her. SW discussed with Jessica and pt that the family and pt deciding where to live cannot be addressed at discharge from the hospital, and family would need to come up with a plan for pt. Pt stated she did not want to listen to SW talk anymore.  Audrey and Jessica will both talk to pt to attempt to come to an agreement with pt on location of discharge.    SW discussed pt concerns for returning to daughter [facility] home with bedside nurse and Dr Rodríguez. Pt states her daughter has a cat she does not like, and daughters cooking is bad, and daughter takes money for groceries. Pt states she does not eat that much. Pt stated Audrey took $200 for groceries. Pt states she owns her house and Audrey wants her to put [facility] name on house or sell house.   Pt stated she wants to [facility] back to [facility]. Pt stated Audrey has her keys, glasses, purse, medications and cell phone at home.    SW contacted Audrey to discuss the discharge. Audrey states she will  her mother, but her mother knows that means she will go either to [facility] or a nursing home due to previous experiences.  DIANNA met with pt in pt room. DIANNA discussed with pt that she is medically ready to discharge.She cannot stay at the hospital, and the hospital does not solve family issues. DIANNA informed pt of her choices, one is that if she does not discharge with family, we will have to either issue a HINN, and explained the financial consequences, or discharge with daughter, go to daughters home and discuss the living situation with entire family to come up with a resolution, or have daughter bring her to the house in [place], and SW will have home care go to that [facility] and SW will need to contact the [facility] vulnerable adult and file a report.  Pt stated she owns the house and does not need to listen to SW.   Pt stated will allow Audrey to  bring her to her home in [place].   DIANNA contacted pt daughter, and SW told Audrey that pt is still own decision maker, and she cannot stay in the hospital due to family indecision. DIANNA requested that Audrey bring pt glasses, keys, purse,cell phone and bring pt to [facility] to retrieve her medications.    Once pt discharges DIANNA will file a vulnerable adult report.   DIANNA again contacted [facility] regarding referral. Left message. DIANNA contacted a supervisor [place] and left a message regarding referral.     KATELIN Sam

## 2022-01-06 NOTE — PLAN OF CARE
Pt sleeping so far this shift.  Ativan given previous shift for anxiety and restlessness. Uneventful shift.  Care plan reviewed.   Problem: Adult Inpatient Plan of Care  Goal: Plan of Care Review  Outcome: Improving  Goal: Patient-Specific Goal (Individualized)  Outcome: Improving  Goal: Absence of Hospital-Acquired Illness or Injury  Outcome: Improving  Intervention: Identify and Manage Fall Risk  Recent Flowsheet Documentation  Taken 1/6/2022 0100 by Kristyn Huff RN  Safety Promotion/Fall Prevention: bed alarm on  Intervention: Prevent Skin Injury  Recent Flowsheet Documentation  Taken 1/6/2022 0100 by Kristyn Huff RN  Body Position: position changed independently  Goal: Optimal Comfort and Wellbeing  Outcome: Improving  Goal: Readiness for Transition of Care  Outcome: Improving     Problem: Risk for Delirium  Goal: Optimal Coping  Outcome: Improving  Goal: Improved Behavioral Control  Outcome: Improving  Goal: Improved Attention and Thought Clarity  Outcome: Improving  Goal: Improved Sleep  Outcome: Improving     Problem: UTI (Urinary Tract Infection)  Goal: Improved Infection Symptoms  Outcome: Improving

## 2022-01-06 NOTE — PLAN OF CARE
Physical Therapy Discharge Summary    Reason for therapy discharge:    Discharged to home.    Progress towards therapy goal(s). See goals on Care Plan in [facility] electronic health record for goal details.  Goals met    Therapy recommendation(s):    No further therapy is recommended.

## 2022-01-06 NOTE — PLAN OF CARE
Goal: Improved Behavioral Control  Outcome: No Change     Problem: Adult Inpatient Plan of Care  Goal: Absence of Hospital-Acquired Illness or Injury  Intervention: Identify and Manage Fall Risk    Safety Promotion/Fall Prevention:    activity supervised    mobility aid in reach    fall prevention program maintained    patient and family education    nonskid shoes/slippers when out of bed     Problem: UTI (Urinary Tract Infection)  Goal: Improved Infection Symptoms  Outcome: Improving    Frequent call light use. Easily frustrated.  Many complaints re: family and Staff behavior.  Requesting to have Nurse stay in room with her as she feels lonely.    Up to bathroom several time.  Had two loose stools.  Voiding in good amounts. Scheduled Lactulose given.     Empathetic listening.  Offered essential oils for relaxation.  Ativan given at HS for anxiety.

## 2022-01-07 ENCOUNTER — PATIENT OUTREACH (OUTPATIENT)
Dept: CARE COORDINATION | Facility: CLINIC | Age: 72
End: 2022-01-07
Payer: COMMERCIAL

## 2022-01-07 DIAGNOSIS — Z71.89 OTHER SPECIFIED COUNSELING: ICD-10-CM

## 2022-01-07 NOTE — PLAN OF CARE
Patient discharged to home via wheelchair, accompanied by nursing aide.  Discharge instructions reviewed with the patient & daughter (Audrey) at bedside.   Nicole Sanchez RN

## 2022-01-07 NOTE — PROGRESS NOTES
Clinic Care Coordination Contact  [facility]/Voicemail       Clinical Data: Care Coordinator Outreach  Outreach attempted x 1.  [place]  got a message that memory is full Care Coordinator will try to reach patient again in 1-2 business days.      Kenzie Rouse  [phone]  Care

## 2022-01-08 NOTE — PROGRESS NOTES
Clinic Care Coordination Contact  [facility]/Voicemail       Clinical Data: Care Coordinator Outreach  Outreach attempted x 2. Unable to leave  message on patient's voicemail with call back information and requested return call.  Plan: Care Coordinator will do no further outreaches at this time.    FLORINDA Overton  [phone]  [facility]